# Patient Record
Sex: FEMALE | Race: ASIAN | NOT HISPANIC OR LATINO | URBAN - METROPOLITAN AREA
[De-identification: names, ages, dates, MRNs, and addresses within clinical notes are randomized per-mention and may not be internally consistent; named-entity substitution may affect disease eponyms.]

---

## 2017-03-16 ENCOUNTER — EMERGENCY (EMERGENCY)
Facility: HOSPITAL | Age: 32
LOS: 1 days | Discharge: HOME | End: 2017-03-16
Attending: EMERGENCY MEDICINE | Admitting: EMERGENCY MEDICINE
Payer: COMMERCIAL

## 2017-03-16 VITALS
HEART RATE: 76 BPM | RESPIRATION RATE: 18 BRPM | DIASTOLIC BLOOD PRESSURE: 98 MMHG | TEMPERATURE: 98 F | OXYGEN SATURATION: 99 % | SYSTOLIC BLOOD PRESSURE: 125 MMHG

## 2017-03-16 PROCEDURE — 73110 X-RAY EXAM OF WRIST: CPT | Mod: 26,RT

## 2017-03-16 PROCEDURE — 73070 X-RAY EXAM OF ELBOW: CPT | Mod: 26,RT

## 2017-03-16 PROCEDURE — 73060 X-RAY EXAM OF HUMERUS: CPT | Mod: 26,RT

## 2017-03-16 PROCEDURE — 73030 X-RAY EXAM OF SHOULDER: CPT | Mod: 26,RT

## 2017-03-16 PROCEDURE — 70486 CT MAXILLOFACIAL W/O DYE: CPT | Mod: 26

## 2017-03-16 PROCEDURE — 99284 EMERGENCY DEPT VISIT MOD MDM: CPT | Mod: 25

## 2017-03-16 PROCEDURE — 29125 APPL SHORT ARM SPLINT STATIC: CPT

## 2017-03-16 PROCEDURE — 73090 X-RAY EXAM OF FOREARM: CPT | Mod: 26,RT

## 2017-03-16 PROCEDURE — 70450 CT HEAD/BRAIN W/O DYE: CPT | Mod: 26

## 2017-03-16 RX ORDER — ACETAMINOPHEN 500 MG
650 TABLET ORAL ONCE
Qty: 0 | Refills: 0 | Status: COMPLETED | OUTPATIENT
Start: 2017-03-16 | End: 2017-03-16

## 2017-03-16 RX ADMIN — Medication 650 MILLIGRAM(S): at 14:10
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
## 2017-03-16 NOTE — ED PROVIDER NOTE - NS ED MD SCRIBE ATTENDING SCRIBE SECTIONS
VITAL SIGNS( Pullset)/PHYSICAL EXAM/HIV/HISTORY OF PRESENT ILLNESS/PAST MEDICAL/SURGICAL/SOCIAL HISTORY/REVIEW OF SYSTEMS/DISPOSITION

## 2017-03-16 NOTE — ED PROCEDURE NOTE - NS ED PERI VASCULAR NEG
no swelling/fingers/toes warm to touch/capillary refill time < 2 seconds/no paresthesia/Pt able to wiggle fingers. Sensation intact.
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
## 2017-03-16 NOTE — ED PROVIDER NOTE - UPPER EXTREMITY EXAM, RIGHT
Tenderness to palpation over the mid-shaft of the humerus, over the medial epicondyle, and the right snuffbox. No tenderness to palpation over the olecranon.
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
## 2017-03-16 NOTE — ED PROVIDER NOTE - EXTREMITY EXAM
right upper extremity findings/Bilateral shoulders with no obvious deformity. No clavicle tenderness to palpation. Bilateral shoulders with no bony tenderness. Normal range of motion of bilateral hips. No signs of trauma on lower extremities.
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
## 2017-03-20 NOTE — PROVIDER CONTACT NOTE (OTHER) - ASSESSMENT
josesito received call from Christine Dawson [facility] [phone] requesting chart copy to be faxed to [phone].  ED provider note, xra results and discharge notes faxed as requested.

## 2017-10-24 PROBLEM — Z00.00 ENCOUNTER FOR PREVENTIVE HEALTH EXAMINATION: Noted: 2017-10-24

## 2017-11-13 ENCOUNTER — APPOINTMENT (OUTPATIENT)
Dept: OBGYN | Facility: CLINIC | Age: 32
End: 2017-11-13
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
## 2018-09-12 ENCOUNTER — APPOINTMENT (OUTPATIENT)
Dept: OBGYN | Facility: CLINIC | Age: 33
End: 2018-09-12

## 2019-03-27 ENCOUNTER — APPOINTMENT (OUTPATIENT)
Dept: OBGYN | Facility: CLINIC | Age: 34
End: 2019-03-27

## 2019-04-04 ENCOUNTER — APPOINTMENT (OUTPATIENT)
Dept: OBGYN | Facility: CLINIC | Age: 34
End: 2019-04-04
Payer: COMMERCIAL

## 2019-04-04 VITALS
BODY MASS INDEX: 29.02 KG/M2 | WEIGHT: 170 LBS | HEIGHT: 64 IN | DIASTOLIC BLOOD PRESSURE: 88 MMHG | SYSTOLIC BLOOD PRESSURE: 124 MMHG

## 2019-04-04 DIAGNOSIS — R73.03 PREDIABETES.: ICD-10-CM

## 2019-04-04 DIAGNOSIS — O02.1 MISSED ABORTION: ICD-10-CM

## 2019-04-04 PROCEDURE — 99385 PREV VISIT NEW AGE 18-39: CPT

## 2019-04-05 LAB — HPV HIGH+LOW RISK DNA PNL CVX: NOT DETECTED

## 2019-04-09 ENCOUNTER — APPOINTMENT (OUTPATIENT)
Dept: OBGYN | Facility: CLINIC | Age: 34
End: 2019-04-09

## 2019-04-09 LAB — CYTOLOGY CVX/VAG DOC THIN PREP: NORMAL

## 2019-04-15 ENCOUNTER — APPOINTMENT (OUTPATIENT)
Dept: OBGYN | Facility: CLINIC | Age: 34
End: 2019-04-15

## 2019-04-18 ENCOUNTER — APPOINTMENT (OUTPATIENT)
Dept: OBGYN | Facility: CLINIC | Age: 34
End: 2019-04-18

## 2019-05-29 ENCOUNTER — APPOINTMENT (OUTPATIENT)
Dept: OBGYN | Facility: CLINIC | Age: 34
End: 2019-05-29
Payer: COMMERCIAL

## 2019-05-29 PROCEDURE — 36415 COLL VENOUS BLD VENIPUNCTURE: CPT

## 2019-06-01 LAB
FSH SERPL-MCNC: 5.9 IU/L
LH SERPL-ACNC: 11.2 IU/L
PROGEST SERPL-MCNC: 0.1 NG/ML
PROLACTIN SERPL-MCNC: 8 NG/ML
TESTOST SERPL-MCNC: 16.4 NG/DL
TSH SERPL-ACNC: 2.58 UIU/ML

## 2019-06-03 LAB — ANTI-MUELLERIAN HORMONE: 4.65 NG/ML

## 2019-06-04 LAB — DHEA-SULFATE, SERUM: 84 UG/DL

## 2019-06-17 ENCOUNTER — APPOINTMENT (OUTPATIENT)
Dept: OBGYN | Facility: CLINIC | Age: 34
End: 2019-06-17

## 2019-08-30 ENCOUNTER — APPOINTMENT (OUTPATIENT)
Dept: OBGYN | Facility: CLINIC | Age: 34
End: 2019-08-30

## 2019-09-12 ENCOUNTER — APPOINTMENT (OUTPATIENT)
Dept: OBGYN | Facility: CLINIC | Age: 34
End: 2019-09-12

## 2020-08-05 ENCOUNTER — ASOB RESULT (OUTPATIENT)
Age: 35
End: 2020-08-05

## 2020-08-05 ENCOUNTER — APPOINTMENT (OUTPATIENT)
Dept: OBGYN | Facility: CLINIC | Age: 35
End: 2020-08-05
Payer: SELF-PAY

## 2020-08-05 VITALS
HEIGHT: 64 IN | WEIGHT: 174 LBS | SYSTOLIC BLOOD PRESSURE: 125 MMHG | DIASTOLIC BLOOD PRESSURE: 80 MMHG | BODY MASS INDEX: 29.71 KG/M2

## 2020-08-05 PROCEDURE — 36415 COLL VENOUS BLD VENIPUNCTURE: CPT

## 2020-08-05 PROCEDURE — 99395 PREV VISIT EST AGE 18-39: CPT

## 2020-08-05 PROCEDURE — 76817 TRANSVAGINAL US OBSTETRIC: CPT

## 2020-08-05 PROCEDURE — 99213 OFFICE O/P EST LOW 20 MIN: CPT | Mod: 25

## 2020-08-06 ENCOUNTER — APPOINTMENT (OUTPATIENT)
Dept: OBGYN | Facility: CLINIC | Age: 35
End: 2020-08-06

## 2020-08-06 LAB
ABO + RH PNL BLD: NORMAL
ALBUMIN SERPL ELPH-MCNC: 4.1 G/DL
ALP BLD-CCNC: 74 U/L
ALT SERPL-CCNC: 12 U/L
ANION GAP SERPL CALC-SCNC: 10 MMOL/L
AST SERPL-CCNC: 10 U/L
BASOPHILS # BLD AUTO: 0.09 K/UL
BASOPHILS NFR BLD AUTO: 1 %
BILIRUB SERPL-MCNC: 0.5 MG/DL
BLD GP AB SCN SERPL QL: NORMAL
BUN SERPL-MCNC: 10 MG/DL
CALCIUM SERPL-MCNC: 9.3 MG/DL
CHLORIDE SERPL-SCNC: 103 MMOL/L
CO2 SERPL-SCNC: 22 MMOL/L
CREAT SERPL-MCNC: 0.53 MG/DL
EOSINOPHIL # BLD AUTO: 0.39 K/UL
EOSINOPHIL NFR BLD AUTO: 4.5 %
GLUCOSE SERPL-MCNC: 260 MG/DL
HCT VFR BLD CALC: 43.1 %
HGB BLD-MCNC: 13.2 G/DL
HIV1+2 AB SPEC QL IA.RAPID: NONREACTIVE
IMM GRANULOCYTES NFR BLD AUTO: 0.3 %
LYMPHOCYTES # BLD AUTO: 2.19 K/UL
LYMPHOCYTES NFR BLD AUTO: 25.4 %
MAN DIFF?: NORMAL
MCHC RBC-ENTMCNC: 27.8 PG
MCHC RBC-ENTMCNC: 30.6 GM/DL
MCV RBC AUTO: 90.7 FL
MONOCYTES # BLD AUTO: 0.47 K/UL
MONOCYTES NFR BLD AUTO: 5.4 %
NEUTROPHILS # BLD AUTO: 5.46 K/UL
NEUTROPHILS NFR BLD AUTO: 63.4 %
PLATELET # BLD AUTO: 278 K/UL
POTASSIUM SERPL-SCNC: 4.3 MMOL/L
PROT SERPL-MCNC: 6.9 G/DL
RBC # BLD: 4.75 M/UL
RBC # FLD: 13.4 %
SODIUM SERPL-SCNC: 135 MMOL/L
TSH SERPL-ACNC: 1.17 UIU/ML
WBC # FLD AUTO: 8.63 K/UL

## 2020-08-07 LAB
B19V IGG SER QL IA: 0.2 INDEX
B19V IGG+IGM SER-IMP: NEGATIVE
B19V IGG+IGM SER-IMP: NORMAL
B19V IGM FLD-ACNC: 0.2
B19V IGM SER-ACNC: NEGATIVE
C TRACH RRNA SPEC QL NAA+PROBE: NOT DETECTED
HBV SURFACE AG SER QL: NONREACTIVE
HCV AB SER QL: NONREACTIVE
HCV S/CO RATIO: 0.07 S/CO
HPV HIGH+LOW RISK DNA PNL CVX: NOT DETECTED
LEAD BLD-MCNC: <1 UG/DL
MEV IGG FLD QL IA: 62.5 AU/ML
MEV IGG+IGM SER-IMP: POSITIVE
N GONORRHOEA RRNA SPEC QL NAA+PROBE: NOT DETECTED
RUBV IGG FLD-ACNC: 11.1 INDEX
RUBV IGG SER-IMP: POSITIVE
SOURCE TP AMPLIFICATION: NORMAL
T PALLIDUM AB SER QL IA: NEGATIVE
VZV AB TITR SER: POSITIVE
VZV IGG SER IF-ACNC: 219.5 INDEX

## 2020-08-09 LAB
HGB A MFR BLD: 97.1 %
HGB A2 MFR BLD: 2.4 %
HGB FRACT BLD-IMP: NORMAL
MEV IGM SER QL: NEGATIVE

## 2020-08-10 LAB — CYTOLOGY CVX/VAG DOC THIN PREP: ABNORMAL

## 2020-08-14 LAB
AR GENE MUT ANL BLD/T: NORMAL
CFTR MUT TESTED BLD/T: NEGATIVE

## 2020-08-15 LAB — FMR1 GENE MUT ANL BLD/T: NORMAL

## 2020-08-19 ENCOUNTER — NON-APPOINTMENT (OUTPATIENT)
Age: 35
End: 2020-08-19

## 2020-08-19 ENCOUNTER — APPOINTMENT (OUTPATIENT)
Dept: OBGYN | Facility: CLINIC | Age: 35
End: 2020-08-19
Payer: SELF-PAY

## 2020-08-19 VITALS
BODY MASS INDEX: 29.19 KG/M2 | SYSTOLIC BLOOD PRESSURE: 125 MMHG | DIASTOLIC BLOOD PRESSURE: 84 MMHG | WEIGHT: 171 LBS | HEIGHT: 64 IN

## 2020-08-19 DIAGNOSIS — Z00.00 ENCOUNTER FOR GENERAL ADULT MEDICAL EXAMINATION W/OUT ABNORMAL FINDINGS: ICD-10-CM

## 2020-08-19 PROCEDURE — 99213 OFFICE O/P EST LOW 20 MIN: CPT

## 2020-08-19 PROCEDURE — 0502F SUBSEQUENT PRENATAL CARE: CPT

## 2020-08-24 ENCOUNTER — APPOINTMENT (OUTPATIENT)
Dept: ANTEPARTUM | Facility: CLINIC | Age: 35
End: 2020-08-24
Payer: MEDICAID

## 2020-08-24 ENCOUNTER — ASOB RESULT (OUTPATIENT)
Age: 35
End: 2020-08-24

## 2020-08-24 PROCEDURE — 36416 COLLJ CAPILLARY BLOOD SPEC: CPT

## 2020-08-24 PROCEDURE — 76813 OB US NUCHAL MEAS 1 GEST: CPT

## 2020-09-16 ENCOUNTER — APPOINTMENT (OUTPATIENT)
Dept: OBGYN | Facility: CLINIC | Age: 35
End: 2020-09-16
Payer: SELF-PAY

## 2020-09-16 ENCOUNTER — NON-APPOINTMENT (OUTPATIENT)
Age: 35
End: 2020-09-16

## 2020-09-16 VITALS
SYSTOLIC BLOOD PRESSURE: 108 MMHG | BODY MASS INDEX: 29.37 KG/M2 | DIASTOLIC BLOOD PRESSURE: 82 MMHG | HEIGHT: 64 IN | WEIGHT: 172 LBS

## 2020-09-16 PROCEDURE — 0502F SUBSEQUENT PRENATAL CARE: CPT

## 2020-09-16 PROCEDURE — 99213 OFFICE O/P EST LOW 20 MIN: CPT

## 2020-09-16 RX ORDER — METFORMIN HYDROCHLORIDE 500 MG/1
500 TABLET, COATED ORAL
Qty: 180 | Refills: 0 | Status: COMPLETED | COMMUNITY
Start: 2019-04-04 | End: 2020-09-16

## 2020-10-07 ENCOUNTER — APPOINTMENT (OUTPATIENT)
Dept: OBGYN | Facility: CLINIC | Age: 35
End: 2020-10-07
Payer: MEDICAID

## 2020-10-07 ENCOUNTER — NON-APPOINTMENT (OUTPATIENT)
Age: 35
End: 2020-10-07

## 2020-10-07 VITALS
HEIGHT: 64 IN | WEIGHT: 170 LBS | BODY MASS INDEX: 29.02 KG/M2 | SYSTOLIC BLOOD PRESSURE: 128 MMHG | DIASTOLIC BLOOD PRESSURE: 70 MMHG

## 2020-10-07 LAB
BILIRUB UR QL STRIP: NORMAL
GLUCOSE UR-MCNC: 250
HCG UR QL: 0.2 EU/DL
HGB UR QL STRIP.AUTO: NORMAL
KETONES UR-MCNC: NORMAL
LEUKOCYTE ESTERASE UR QL STRIP: NORMAL
NITRITE UR QL STRIP: NORMAL
PH UR STRIP: 6
PROT UR STRIP-MCNC: NORMAL
SP GR UR STRIP: 1.02

## 2020-10-07 PROCEDURE — 36415 COLL VENOUS BLD VENIPUNCTURE: CPT

## 2020-10-07 PROCEDURE — 99213 OFFICE O/P EST LOW 20 MIN: CPT | Mod: TH

## 2020-10-09 ENCOUNTER — APPOINTMENT (OUTPATIENT)
Dept: INTERNAL MEDICINE | Facility: CLINIC | Age: 35
End: 2020-10-09
Payer: MEDICAID

## 2020-10-09 ENCOUNTER — NON-APPOINTMENT (OUTPATIENT)
Age: 35
End: 2020-10-09

## 2020-10-09 VITALS
HEART RATE: 85 BPM | HEIGHT: 64 IN | RESPIRATION RATE: 16 BRPM | WEIGHT: 173 LBS | DIASTOLIC BLOOD PRESSURE: 78 MMHG | BODY MASS INDEX: 29.53 KG/M2 | TEMPERATURE: 98.6 F | SYSTOLIC BLOOD PRESSURE: 109 MMHG | OXYGEN SATURATION: 97 %

## 2020-10-09 PROCEDURE — 99203 OFFICE O/P NEW LOW 30 MIN: CPT

## 2020-10-09 RX ORDER — ALBUTEROL 90 MCG
90 AEROSOL (GRAM) INHALATION
Refills: 0 | Status: ACTIVE | COMMUNITY

## 2020-10-09 NOTE — HISTORY OF PRESENT ILLNESS
[FreeTextEntry8] : 4 months pregnant--gyn ([facility])\par asthma has been flaring past 2 wks--usually worse with cooler weather--using rescue 1-2x/day. No signs infection\par Never on maint inhaler or hospitalized for asthma\par to see pulmonary (raiza) on 10/21

## 2020-10-09 NOTE — PHYSICAL EXAM
[Normal] : no posterior cervical lymphadenopathy and no anterior cervical lymphadenopathy [No Focal Deficits] : no focal deficits

## 2020-10-12 LAB
1ST TRIMESTER DATA: NORMAL
2ND TRIMESTER DATA: NORMAL
ADDENDUM DOC: NORMAL
AFP PNL SERPL: NORMAL
AFP PNL SERPL: NORMAL
AFP SERPL-ACNC: NORMAL
AFP SERPL-ACNC: NORMAL
CLINICAL BIOCHEMIST REVIEW: NORMAL
CLINICAL BIOCHEMIST REVIEW: NORMAL
FREE BETA HCG 1ST TRIMESTER: NORMAL
Lab: NORMAL
NOTES NTD: NORMAL
NOTES NTD: NORMAL
NT: NORMAL
PAPP-A SERPL-ACNC: NORMAL
TRISOMY 18/3: NORMAL

## 2020-10-19 ENCOUNTER — ASOB RESULT (OUTPATIENT)
Age: 35
End: 2020-10-19

## 2020-10-19 ENCOUNTER — APPOINTMENT (OUTPATIENT)
Dept: ANTEPARTUM | Facility: CLINIC | Age: 35
End: 2020-10-19
Payer: MEDICAID

## 2020-10-19 PROCEDURE — 76811 OB US DETAILED SNGL FETUS: CPT

## 2020-10-19 PROCEDURE — 99072 ADDL SUPL MATRL&STAF TM PHE: CPT

## 2020-10-19 PROCEDURE — 76817 TRANSVAGINAL US OBSTETRIC: CPT

## 2020-10-21 ENCOUNTER — APPOINTMENT (OUTPATIENT)
Dept: PULMONOLOGY | Facility: CLINIC | Age: 35
End: 2020-10-21

## 2020-11-02 ENCOUNTER — APPOINTMENT (OUTPATIENT)
Dept: ANTEPARTUM | Facility: CLINIC | Age: 35
End: 2020-11-02
Payer: MEDICAID

## 2020-11-02 ENCOUNTER — ASOB RESULT (OUTPATIENT)
Age: 35
End: 2020-11-02

## 2020-11-02 PROCEDURE — 99072 ADDL SUPL MATRL&STAF TM PHE: CPT

## 2020-11-02 PROCEDURE — 76815 OB US LIMITED FETUS(S): CPT | Mod: 59

## 2020-11-04 ENCOUNTER — NON-APPOINTMENT (OUTPATIENT)
Age: 35
End: 2020-11-04

## 2020-11-04 ENCOUNTER — APPOINTMENT (OUTPATIENT)
Dept: OBGYN | Facility: CLINIC | Age: 35
End: 2020-11-04
Payer: MEDICAID

## 2020-11-04 ENCOUNTER — APPOINTMENT (OUTPATIENT)
Dept: OBGYN | Facility: CLINIC | Age: 35
End: 2020-11-04

## 2020-11-04 VITALS
DIASTOLIC BLOOD PRESSURE: 74 MMHG | HEIGHT: 64 IN | SYSTOLIC BLOOD PRESSURE: 120 MMHG | WEIGHT: 172 LBS | BODY MASS INDEX: 29.37 KG/M2

## 2020-11-04 LAB
BILIRUB UR QL STRIP: NORMAL
GLUCOSE UR-MCNC: 1000
HCG UR QL: 0.2 EU/DL
HGB UR QL STRIP.AUTO: NORMAL
KETONES UR-MCNC: NORMAL
LEUKOCYTE ESTERASE UR QL STRIP: NORMAL
NITRITE UR QL STRIP: NORMAL
PH UR STRIP: 5.5
PROT UR STRIP-MCNC: NORMAL
SP GR UR STRIP: 1.01

## 2020-11-04 PROCEDURE — 99072 ADDL SUPL MATRL&STAF TM PHE: CPT

## 2020-11-04 PROCEDURE — 99213 OFFICE O/P EST LOW 20 MIN: CPT | Mod: TH

## 2020-11-06 LAB — BACTERIA UR CULT: NORMAL

## 2020-12-02 ENCOUNTER — APPOINTMENT (OUTPATIENT)
Dept: OBGYN | Facility: CLINIC | Age: 35
End: 2020-12-02
Payer: MEDICAID

## 2020-12-02 ENCOUNTER — NON-APPOINTMENT (OUTPATIENT)
Age: 35
End: 2020-12-02

## 2020-12-02 VITALS
WEIGHT: 173 LBS | SYSTOLIC BLOOD PRESSURE: 108 MMHG | BODY MASS INDEX: 29.53 KG/M2 | HEIGHT: 64 IN | DIASTOLIC BLOOD PRESSURE: 76 MMHG

## 2020-12-02 PROCEDURE — 99072 ADDL SUPL MATRL&STAF TM PHE: CPT

## 2020-12-02 PROCEDURE — 99213 OFFICE O/P EST LOW 20 MIN: CPT | Mod: TH

## 2020-12-03 LAB
BILIRUB UR QL STRIP: NORMAL
GLUCOSE UR-MCNC: 1000
HCG UR QL: 0.2 EU/DL
HGB UR QL STRIP.AUTO: NORMAL
KETONES UR-MCNC: NORMAL
LEUKOCYTE ESTERASE UR QL STRIP: NORMAL
NITRITE UR QL STRIP: NORMAL
PH UR STRIP: 6.5
PROT UR STRIP-MCNC: NORMAL
SP GR UR STRIP: 1.01

## 2020-12-09 ENCOUNTER — APPOINTMENT (OUTPATIENT)
Dept: OBGYN | Facility: CLINIC | Age: 35
End: 2020-12-09
Payer: MEDICAID

## 2020-12-09 PROCEDURE — 90656 IIV3 VACC NO PRSV 0.5 ML IM: CPT

## 2020-12-09 PROCEDURE — 99072 ADDL SUPL MATRL&STAF TM PHE: CPT

## 2020-12-09 PROCEDURE — 90471 IMMUNIZATION ADMIN: CPT

## 2020-12-10 LAB
BASOPHILS # BLD AUTO: 0.07 K/UL
BASOPHILS NFR BLD AUTO: 0.7 %
EOSINOPHIL # BLD AUTO: 0.17 K/UL
EOSINOPHIL NFR BLD AUTO: 1.6 %
GLUCOSE 1H P 50 G GLC PO SERPL-MCNC: 392 MG/DL
HCT VFR BLD CALC: 39.5 %
HGB BLD-MCNC: 12.3 G/DL
IMM GRANULOCYTES NFR BLD AUTO: 0.9 %
LYMPHOCYTES # BLD AUTO: 2.53 K/UL
LYMPHOCYTES NFR BLD AUTO: 24.2 %
MAN DIFF?: NORMAL
MCHC RBC-ENTMCNC: 29.9 PG
MCHC RBC-ENTMCNC: 31.1 GM/DL
MCV RBC AUTO: 95.9 FL
MONOCYTES # BLD AUTO: 0.67 K/UL
MONOCYTES NFR BLD AUTO: 6.4 %
NEUTROPHILS # BLD AUTO: 6.93 K/UL
NEUTROPHILS NFR BLD AUTO: 66.2 %
PLATELET # BLD AUTO: 263 K/UL
RBC # BLD: 4.12 M/UL
RBC # FLD: 13.4 %
T PALLIDUM AB SER QL IA: NEGATIVE
WBC # FLD AUTO: 10.46 K/UL

## 2020-12-14 ENCOUNTER — ASOB RESULT (OUTPATIENT)
Age: 35
End: 2020-12-14

## 2020-12-14 ENCOUNTER — APPOINTMENT (OUTPATIENT)
Dept: MATERNAL FETAL MEDICINE | Facility: CLINIC | Age: 35
End: 2020-12-14
Payer: MEDICAID

## 2020-12-14 VITALS — WEIGHT: 173 LBS | BODY MASS INDEX: 29.53 KG/M2 | HEIGHT: 64 IN

## 2020-12-14 DIAGNOSIS — O24.419 GESTATIONAL DIABETES MELLITUS IN PREGNANCY, UNSPECIFIED CONTROL: ICD-10-CM

## 2020-12-14 DIAGNOSIS — Z83.3 FAMILY HISTORY OF DIABETES MELLITUS: ICD-10-CM

## 2020-12-14 DIAGNOSIS — Z86.32 PERSONAL HISTORY OF GESTATIONAL DIABETES: ICD-10-CM

## 2020-12-14 PROCEDURE — G0108 DIAB MANAGE TRN  PER INDIV: CPT | Mod: 95

## 2020-12-14 RX ORDER — BLOOD-GLUCOSE METER
W/DEVICE KIT MISCELLANEOUS
Qty: 1 | Refills: 0 | Status: ACTIVE | COMMUNITY
Start: 2020-12-14 | End: 1900-01-01

## 2020-12-16 ENCOUNTER — NON-APPOINTMENT (OUTPATIENT)
Age: 35
End: 2020-12-16

## 2020-12-16 ENCOUNTER — INPATIENT (INPATIENT)
Facility: HOSPITAL | Age: 35
LOS: 1 days | Discharge: HOME | End: 2020-12-18
Attending: OBSTETRICS & GYNECOLOGY | Admitting: OBSTETRICS & GYNECOLOGY
Payer: MEDICAID

## 2020-12-16 VITALS
RESPIRATION RATE: 17 BRPM | DIASTOLIC BLOOD PRESSURE: 88 MMHG | TEMPERATURE: 98 F | SYSTOLIC BLOOD PRESSURE: 132 MMHG | HEART RATE: 101 BPM

## 2020-12-16 DIAGNOSIS — Z3A.00 WEEKS OF GESTATION OF PREGNANCY NOT SPECIFIED: ICD-10-CM

## 2020-12-16 DIAGNOSIS — O24.410 GESTATIONAL DIABETES MELLITUS IN PREGNANCY, DIET CONTROLLED: ICD-10-CM

## 2020-12-16 DIAGNOSIS — O26.899 OTHER SPECIFIED PREGNANCY RELATED CONDITIONS, UNSPECIFIED TRIMESTER: ICD-10-CM

## 2020-12-16 LAB
A1C WITH ESTIMATED AVERAGE GLUCOSE RESULT: 8.4 % — HIGH (ref 4–5.6)
ALBUMIN SERPL ELPH-MCNC: 3.4 G/DL — SIGNIFICANT CHANGE UP (ref 3.3–5)
ALP SERPL-CCNC: 96 U/L — SIGNIFICANT CHANGE UP (ref 40–120)
ALT FLD-CCNC: 12 U/L — SIGNIFICANT CHANGE UP (ref 4–33)
ANION GAP SERPL CALC-SCNC: 12 MMOL/L — SIGNIFICANT CHANGE UP (ref 7–14)
APPEARANCE UR: CLEAR — SIGNIFICANT CHANGE UP
AST SERPL-CCNC: 15 U/L — SIGNIFICANT CHANGE UP (ref 4–32)
B-OH-BUTYR SERPL-SCNC: 0.4 MMOL/L — SIGNIFICANT CHANGE UP (ref 0–0.4)
BILIRUB SERPL-MCNC: 0.5 MG/DL — SIGNIFICANT CHANGE UP (ref 0.2–1.2)
BILIRUB UR-MCNC: NEGATIVE — SIGNIFICANT CHANGE UP
BLD GP AB SCN SERPL QL: NEGATIVE — SIGNIFICANT CHANGE UP
BUN SERPL-MCNC: 7 MG/DL — SIGNIFICANT CHANGE UP (ref 7–23)
CALCIUM SERPL-MCNC: 9.2 MG/DL — SIGNIFICANT CHANGE UP (ref 8.4–10.5)
CHLORIDE SERPL-SCNC: 101 MMOL/L — SIGNIFICANT CHANGE UP (ref 98–107)
CO2 SERPL-SCNC: 19 MMOL/L — LOW (ref 22–31)
COLOR SPEC: SIGNIFICANT CHANGE UP
CREAT SERPL-MCNC: 0.39 MG/DL — LOW (ref 0.5–1.3)
DIFF PNL FLD: NEGATIVE — SIGNIFICANT CHANGE UP
ESTIMATED AVERAGE GLUCOSE: 194 MG/DL — HIGH (ref 68–114)
GLUCOSE BLDC GLUCOMTR-MCNC: 105 MG/DL — HIGH (ref 70–99)
GLUCOSE BLDC GLUCOMTR-MCNC: 131 MG/DL — HIGH (ref 70–99)
GLUCOSE BLDC GLUCOMTR-MCNC: 175 MG/DL — HIGH (ref 70–99)
GLUCOSE BLDC GLUCOMTR-MCNC: 207 MG/DL — HIGH (ref 70–99)
GLUCOSE SERPL-MCNC: 170 MG/DL — HIGH (ref 70–99)
GLUCOSE UR QL: ABNORMAL
KETONES UR-MCNC: NEGATIVE — SIGNIFICANT CHANGE UP
LEUKOCYTE ESTERASE UR-ACNC: NEGATIVE — SIGNIFICANT CHANGE UP
MAGNESIUM SERPL-MCNC: 1.6 MG/DL — SIGNIFICANT CHANGE UP (ref 1.6–2.6)
NITRITE UR-MCNC: NEGATIVE — SIGNIFICANT CHANGE UP
PH UR: 6.5 — SIGNIFICANT CHANGE UP (ref 5–8)
POTASSIUM SERPL-MCNC: 3.8 MMOL/L — SIGNIFICANT CHANGE UP (ref 3.5–5.3)
POTASSIUM SERPL-SCNC: 3.8 MMOL/L — SIGNIFICANT CHANGE UP (ref 3.5–5.3)
PROT SERPL-MCNC: 6.5 G/DL — SIGNIFICANT CHANGE UP (ref 6–8.3)
PROT UR-MCNC: NEGATIVE — SIGNIFICANT CHANGE UP
RH IG SCN BLD-IMP: POSITIVE — SIGNIFICANT CHANGE UP
SARS-COV-2 RNA SPEC QL NAA+PROBE: SIGNIFICANT CHANGE UP
SODIUM SERPL-SCNC: 132 MMOL/L — LOW (ref 135–145)
SP GR SPEC: 1.01 — SIGNIFICANT CHANGE UP (ref 1.01–1.02)
UROBILINOGEN FLD QL: SIGNIFICANT CHANGE UP

## 2020-12-16 PROCEDURE — 93010 ELECTROCARDIOGRAM REPORT: CPT

## 2020-12-16 RX ORDER — SODIUM CHLORIDE 9 MG/ML
1000 INJECTION, SOLUTION INTRAVENOUS
Refills: 0 | Status: DISCONTINUED | OUTPATIENT
Start: 2020-12-16 | End: 2020-12-18

## 2020-12-16 RX ORDER — INSULIN DETEMIR 100/ML (3)
30 INSULIN PEN (ML) SUBCUTANEOUS AT BEDTIME
Refills: 0 | Status: DISCONTINUED | OUTPATIENT
Start: 2020-12-16 | End: 2020-12-17

## 2020-12-16 RX ORDER — DEXTROSE 50 % IN WATER 50 %
25 SYRINGE (ML) INTRAVENOUS ONCE
Refills: 0 | Status: DISCONTINUED | OUTPATIENT
Start: 2020-12-16 | End: 2020-12-18

## 2020-12-16 RX ORDER — SODIUM CHLORIDE 9 MG/ML
1000 INJECTION INTRAMUSCULAR; INTRAVENOUS; SUBCUTANEOUS ONCE
Refills: 0 | Status: COMPLETED | OUTPATIENT
Start: 2020-12-16 | End: 2020-12-16

## 2020-12-16 RX ORDER — DEXTROSE 50 % IN WATER 50 %
15 SYRINGE (ML) INTRAVENOUS ONCE
Refills: 0 | Status: DISCONTINUED | OUTPATIENT
Start: 2020-12-16 | End: 2020-12-18

## 2020-12-16 RX ORDER — DEXTROSE 50 % IN WATER 50 %
12.5 SYRINGE (ML) INTRAVENOUS ONCE
Refills: 0 | Status: DISCONTINUED | OUTPATIENT
Start: 2020-12-16 | End: 2020-12-18

## 2020-12-16 RX ORDER — SODIUM CHLORIDE 9 MG/ML
3 INJECTION INTRAMUSCULAR; INTRAVENOUS; SUBCUTANEOUS EVERY 8 HOURS
Refills: 0 | Status: DISCONTINUED | OUTPATIENT
Start: 2020-12-16 | End: 2020-12-18

## 2020-12-16 RX ORDER — ALBUTEROL 90 UG/1
2 AEROSOL, METERED ORAL EVERY 6 HOURS
Refills: 0 | Status: DISCONTINUED | OUTPATIENT
Start: 2020-12-16 | End: 2020-12-18

## 2020-12-16 RX ORDER — INSULIN LISPRO 100/ML
10 VIAL (ML) SUBCUTANEOUS
Refills: 0 | Status: DISCONTINUED | OUTPATIENT
Start: 2020-12-16 | End: 2020-12-17

## 2020-12-16 RX ORDER — GLUCAGON INJECTION, SOLUTION 0.5 MG/.1ML
1 INJECTION, SOLUTION SUBCUTANEOUS ONCE
Refills: 0 | Status: DISCONTINUED | OUTPATIENT
Start: 2020-12-16 | End: 2020-12-18

## 2020-12-16 RX ORDER — ALBUTEROL 90 UG/1
0 AEROSOL, METERED ORAL
Qty: 0 | Refills: 0 | DISCHARGE

## 2020-12-16 RX ORDER — SODIUM CHLORIDE 9 MG/ML
1000 INJECTION, SOLUTION INTRAVENOUS
Refills: 0 | Status: DISCONTINUED | OUTPATIENT
Start: 2020-12-16 | End: 2020-12-16

## 2020-12-16 RX ORDER — SODIUM CHLORIDE 9 MG/ML
1000 INJECTION INTRAMUSCULAR; INTRAVENOUS; SUBCUTANEOUS
Refills: 0 | Status: DISCONTINUED | OUTPATIENT
Start: 2020-12-16 | End: 2020-12-16

## 2020-12-16 RX ADMIN — Medication 30 UNIT(S): at 23:21

## 2020-12-16 RX ADMIN — SODIUM CHLORIDE 1000 MILLILITER(S): 9 INJECTION INTRAMUSCULAR; INTRAVENOUS; SUBCUTANEOUS at 18:05

## 2020-12-16 NOTE — OB PROVIDER TRIAGE NOTE - NSOBPROVIDERNOTE_OBGYN_ALL_OB_FT
34 y.o.   patient  TIMMY 3/2/2021 @ 29.1 weeks presents with c/o dizziness after eating and blood sugar of 329. Pt denies LOF, VB, ctx. State +FM. Pt is newly diagnosed GDMA1 (). Pt states she is concerned that her blood sugar was 329 after eating pancakes this at 1130 am and that she experiences dizziness after eating.    allergies:  NKDA  medications:  prenatal vitamins    med/surg hx:  asthma - last exacerbation 3 months ago, nebulizer treatment, albuterol prn  OBGYN hx:  2008  7lbs 5 oz GDMA1  TOP x 2 - ,     abdomen soft, nontender  nst reactive  toco: uterine irritability    orthostatic BPs  111/83 P 85  113/75 P 91  111/70 P 108    EKG NSR     34 y.o.  [place] patient  TIMMY 3/2/2021 @ 29.1 weeks presents with c/o dizziness after eating and blood sugar of 329. Pt denies LOF, VB, ctx. State +FM. Pt is newly diagnosed GDMA1 (). Pt states she is concerned that her blood sugar was 329 after eating pancakes this at 1130 am and that she experiences dizziness after eating.    allergies:  NKDA  medications:  prenatal vitamins    med/surg hx:  asthma - last exacerbation 3 months ago, nebulizer treatment, albuterol prn  OBGYN hx:  2008  7lbs 5 oz GDMA1  TOP x 2 - ,     abdomen soft, nontender  nst reactive  toco: uterine irritability    orthostatic BPs  111/83 P 85  113/75 P 91  111/70 P 108    EKG NSR      cmp, ua, serum acetone, hemaglobin A1c pending    1700 Report to BARBIE Pulliam NP.    1800  Received report from BARBIE Pulliam NP. Pt admitted for observation r/o DKA. See H+P    BORIS Harper

## 2020-12-16 NOTE — OB PROVIDER H&P - HISTORY OF PRESENT ILLNESS
34 y.o.   patient  TIMMY 3/2/2021 @ 29.1 weeks presents with c/o dizziness after eating and blood sugar of 329. Pt denies LOF, VB, ctx. State +FM. Pt is newly diagnosed GDMA1 (). Pt states she is concerned that her blood sugar was 329 after eating pancakes this at 1130 am and that she experiences dizziness after eating.    allergies:  NKDA  medications:  prenatal vitamins    med/surg hx:  asthma - last exacerbation 3 months ago, nebulizer treatment, albuterol prn  OBGYN hx:  2008  7lbs 5 oz GDMA1  TOP x 2 - ,

## 2020-12-16 NOTE — OB RN TRIAGE NOTE - CHIEF COMPLAINT QUOTE
I've been dizzy for 2-3 days, I have gestational diabetes and took my sugar at 12:30pm one hour after I ate it was 324

## 2020-12-16 NOTE — OB PROVIDER TRIAGE NOTE - HISTORY OF PRESENT ILLNESS
34 y.o.   patient  TIMMY 3/2/2021 @ 29.1 weeks presents with c/o dizziness after eating and blood sugar of 329. Pt denies LOF, VB, ctx. State +FM. Pt is newly diagnosed GDMA1 (). Pt states she is concerned that her blood sugar was 329 after eating pancakes this at 1130 am and that she experiences dizziness after eating.

## 2020-12-16 NOTE — OB PROVIDER TRIAGE NOTE - PMH
Asthma  last attack 3 months ago used nebulizer  History of termination of pregnancy  ,   Normal vaginal delivery  08 GDMA1  7#5 female

## 2020-12-17 ENCOUNTER — TRANSCRIPTION ENCOUNTER (OUTPATIENT)
Age: 35
End: 2020-12-17

## 2020-12-17 DIAGNOSIS — O24.919 UNSPECIFIED DIABETES MELLITUS IN PREGNANCY, UNSPECIFIED TRIMESTER: ICD-10-CM

## 2020-12-17 LAB
ALBUMIN SERPL ELPH-MCNC: 2.8 G/DL — LOW (ref 3.3–5)
ALP SERPL-CCNC: 82 U/L — SIGNIFICANT CHANGE UP (ref 40–120)
ALT FLD-CCNC: 9 U/L — SIGNIFICANT CHANGE UP (ref 4–33)
ANION GAP SERPL CALC-SCNC: 13 MMOL/L — SIGNIFICANT CHANGE UP (ref 7–14)
AST SERPL-CCNC: 14 U/L — SIGNIFICANT CHANGE UP (ref 4–32)
B-OH-BUTYR SERPL-SCNC: 0.3 MMOL/L — SIGNIFICANT CHANGE UP (ref 0–0.4)
BASOPHILS # BLD AUTO: 0.06 K/UL — SIGNIFICANT CHANGE UP (ref 0–0.2)
BASOPHILS NFR BLD AUTO: 0.7 % — SIGNIFICANT CHANGE UP (ref 0–2)
BILIRUB SERPL-MCNC: 0.5 MG/DL — SIGNIFICANT CHANGE UP (ref 0.2–1.2)
BUN SERPL-MCNC: 8 MG/DL — SIGNIFICANT CHANGE UP (ref 7–23)
CALCIUM SERPL-MCNC: 8.9 MG/DL — SIGNIFICANT CHANGE UP (ref 8.4–10.5)
CHLORIDE SERPL-SCNC: 104 MMOL/L — SIGNIFICANT CHANGE UP (ref 98–107)
CO2 SERPL-SCNC: 19 MMOL/L — LOW (ref 22–31)
CREAT SERPL-MCNC: 0.45 MG/DL — LOW (ref 0.5–1.3)
EOSINOPHIL # BLD AUTO: 0.18 K/UL — SIGNIFICANT CHANGE UP (ref 0–0.5)
EOSINOPHIL NFR BLD AUTO: 2.1 % — SIGNIFICANT CHANGE UP (ref 0–6)
GLUCOSE BLDC GLUCOMTR-MCNC: 111 MG/DL — HIGH (ref 70–99)
GLUCOSE BLDC GLUCOMTR-MCNC: 117 MG/DL — HIGH (ref 70–99)
GLUCOSE BLDC GLUCOMTR-MCNC: 120 MG/DL — HIGH (ref 70–99)
GLUCOSE BLDC GLUCOMTR-MCNC: 123 MG/DL — HIGH (ref 70–99)
GLUCOSE BLDC GLUCOMTR-MCNC: 127 MG/DL — HIGH (ref 70–99)
GLUCOSE BLDC GLUCOMTR-MCNC: 134 MG/DL — HIGH (ref 70–99)
GLUCOSE BLDC GLUCOMTR-MCNC: 224 MG/DL — HIGH (ref 70–99)
GLUCOSE BLDC GLUCOMTR-MCNC: 96 MG/DL — SIGNIFICANT CHANGE UP (ref 70–99)
GLUCOSE SERPL-MCNC: 100 MG/DL — HIGH (ref 70–99)
HCT VFR BLD CALC: 33.5 % — LOW (ref 34.5–45)
HGB BLD-MCNC: 11.2 G/DL — LOW (ref 11.5–15.5)
IANC: 5.08 K/UL — SIGNIFICANT CHANGE UP (ref 1.5–8.5)
IMM GRANULOCYTES NFR BLD AUTO: 0.7 % — SIGNIFICANT CHANGE UP (ref 0–1.5)
LYMPHOCYTES # BLD AUTO: 2.6 K/UL — SIGNIFICANT CHANGE UP (ref 1–3.3)
LYMPHOCYTES # BLD AUTO: 30.3 % — SIGNIFICANT CHANGE UP (ref 13–44)
MCHC RBC-ENTMCNC: 30.2 PG — SIGNIFICANT CHANGE UP (ref 27–34)
MCHC RBC-ENTMCNC: 33.4 GM/DL — SIGNIFICANT CHANGE UP (ref 32–36)
MCV RBC AUTO: 90.3 FL — SIGNIFICANT CHANGE UP (ref 80–100)
MONOCYTES # BLD AUTO: 0.59 K/UL — SIGNIFICANT CHANGE UP (ref 0–0.9)
MONOCYTES NFR BLD AUTO: 6.9 % — SIGNIFICANT CHANGE UP (ref 2–14)
NEUTROPHILS # BLD AUTO: 5.08 K/UL — SIGNIFICANT CHANGE UP (ref 1.8–7.4)
NEUTROPHILS NFR BLD AUTO: 59.3 % — SIGNIFICANT CHANGE UP (ref 43–77)
NRBC # BLD: 0 /100 WBCS — SIGNIFICANT CHANGE UP
NRBC # FLD: 0 K/UL — SIGNIFICANT CHANGE UP
PLATELET # BLD AUTO: 211 K/UL — SIGNIFICANT CHANGE UP (ref 150–400)
POTASSIUM SERPL-MCNC: 3.3 MMOL/L — LOW (ref 3.5–5.3)
POTASSIUM SERPL-SCNC: 3.3 MMOL/L — LOW (ref 3.5–5.3)
PROT SERPL-MCNC: 5.9 G/DL — LOW (ref 6–8.3)
RBC # BLD: 3.71 M/UL — LOW (ref 3.8–5.2)
RBC # FLD: 13.3 % — SIGNIFICANT CHANGE UP (ref 10.3–14.5)
RH IG SCN BLD-IMP: POSITIVE — SIGNIFICANT CHANGE UP
SODIUM SERPL-SCNC: 136 MMOL/L — SIGNIFICANT CHANGE UP (ref 135–145)
T PALLIDUM AB TITR SER: NEGATIVE — SIGNIFICANT CHANGE UP
WBC # BLD: 8.57 K/UL — SIGNIFICANT CHANGE UP (ref 3.8–10.5)
WBC # FLD AUTO: 8.57 K/UL — SIGNIFICANT CHANGE UP (ref 3.8–10.5)

## 2020-12-17 PROCEDURE — 99221 1ST HOSP IP/OBS SF/LOW 40: CPT

## 2020-12-17 RX ORDER — HEPARIN SODIUM 5000 [USP'U]/ML
5000 INJECTION INTRAVENOUS; SUBCUTANEOUS EVERY 12 HOURS
Refills: 0 | Status: DISCONTINUED | OUTPATIENT
Start: 2020-12-17 | End: 2020-12-18

## 2020-12-17 RX ORDER — INSULIN LISPRO 100/ML
12 VIAL (ML) SUBCUTANEOUS
Refills: 0 | Status: DISCONTINUED | OUTPATIENT
Start: 2020-12-17 | End: 2020-12-18

## 2020-12-17 RX ORDER — INSULIN DETEMIR 100/ML (3)
36 INSULIN PEN (ML) SUBCUTANEOUS
Qty: 1080 | Refills: 0
Start: 2020-12-17 | End: 2021-01-15

## 2020-12-17 RX ORDER — INSULIN LISPRO 100/ML
12 VIAL (ML) SUBCUTANEOUS
Qty: 1080 | Refills: 0
Start: 2020-12-17 | End: 2021-01-15

## 2020-12-17 RX ORDER — INSULIN DETEMIR 100/ML (3)
36 INSULIN PEN (ML) SUBCUTANEOUS AT BEDTIME
Refills: 0 | Status: DISCONTINUED | OUTPATIENT
Start: 2020-12-17 | End: 2020-12-18

## 2020-12-17 RX ADMIN — SODIUM CHLORIDE 3 MILLILITER(S): 9 INJECTION INTRAMUSCULAR; INTRAVENOUS; SUBCUTANEOUS at 22:17

## 2020-12-17 RX ADMIN — HEPARIN SODIUM 5000 UNIT(S): 5000 INJECTION INTRAVENOUS; SUBCUTANEOUS at 22:22

## 2020-12-17 RX ADMIN — Medication 12 UNIT(S): at 17:21

## 2020-12-17 RX ADMIN — SODIUM CHLORIDE 3 MILLILITER(S): 9 INJECTION INTRAMUSCULAR; INTRAVENOUS; SUBCUTANEOUS at 13:25

## 2020-12-17 RX ADMIN — Medication 12 UNIT(S): at 13:09

## 2020-12-17 RX ADMIN — SODIUM CHLORIDE 3 MILLILITER(S): 9 INJECTION INTRAMUSCULAR; INTRAVENOUS; SUBCUTANEOUS at 06:36

## 2020-12-17 RX ADMIN — Medication 36 UNIT(S): at 22:18

## 2020-12-17 RX ADMIN — Medication 10 UNIT(S): at 09:43

## 2020-12-17 NOTE — DISCHARGE NOTE ANTEPARTUM - CARE PROVIDER_API CALL
[facility] - GENERAL OTHER  [address], [address] [place] [address]  Phone: [phone]  Fax: [phone]  Follow Up Time:

## 2020-12-17 NOTE — DISCHARGE NOTE ANTEPARTUM - HOSPITAL COURSE
34  at [id number], newly diagnosed GDM, admitted with poorly controlled finger sticks and dizziness. FS at home 329. Patient admitted for blood glucose monitoring and control.  A1C 8.4   Increase Levemir from 30QHS -> 36u QHS due to elevated  this morning, Admelog 10/10/10-> 12 with meals. BSS (): Micheal breech, GAYE 18.2. Nutrition consulted for education regarding diet. Patient no longer feeling dizzy. overall feeling well and understands plan of care and diabetes education. Denies contractions vaginal bleeding leaking fluid. Endorses good fetal movement. Will be discharged home with Humalog and Levemir for close outpatient follow up with Diabetes Educator on Monday and OB next week.  34  at [id number], newly diagnosed GDM, admitted with poorly controlled finger sticks and dizziness. FS at home 329. Patient admitted for blood glucose monitoring and control.  A1C 8.4   Increase Levemir from 30QHS -> 36u QHS due to elevated  this morning, Admelog 10/10/10-> 12-> 12 with meals. BSS (): Micheal breech, GAYE 18.2. Nutrition consulted for education regarding diet. Patient no longer feeling dizzy. overall feeling well and understands plan of care and diabetes education. Denies contractions vaginal bleeding leaking fluid. Endorses good fetal movement. Will be discharged home with Humalog and Levemir for close outpatient follow up with Diabetes Educator on Monday and OB next week.

## 2020-12-17 NOTE — DISCHARGE NOTE ANTEPARTUM - PATIENT PORTAL LINK FT
You can access the FollowMyHealth Patient Portal offered by [facility] by registering at the following website: [email or web address]. By joining [facility]’s FollowMyHealth portal, you will also be able to view your health information using other applications (apps) compatible with our system.

## 2020-12-17 NOTE — DISCHARGE NOTE ANTEPARTUM - CARE PLAN
Principal Discharge DX:	Diabetes in pregnancy  Goal:	Euglycemia  Assessment and plan of treatment:	- Continue to monitor glucose before and one hour post meals  -Continue insulin as prescribed  -Follow up with Diabetes educator on Monday  - Follow up with OB within one week  - Return with contractions, vaginal bleeding, leaking fluid, decreased fetal movement or elevated Blood glucose or hypoglycemia   Principal Discharge DX:	Diabetes in pregnancy  Goal:	Euglycemia  Assessment and plan of treatment:	- Continue to monitor glucose before and one hour post meals  -Continue insulin as prescribed Take 14 units of Admelog before breakfast and 12 units of Admelog before Lunch and Dinner Take 36 units of Levemir at bedtime  -Follow up with Diabetes educator on Monday  - Follow up with OB within one week  - Return with contractions, vaginal bleeding, leaking fluid, decreased fetal movement or elevated Blood glucose or hypoglycemia

## 2020-12-17 NOTE — PROGRESS NOTE ADULT - ATTENDING COMMENTS
Pt seen and examined and agree with above assessment and plan.  continue inpatient management of GDM2, likely pre-existing DM II

## 2020-12-17 NOTE — DISCHARGE NOTE ANTEPARTUM - MEDICATION SUMMARY - MEDICATIONS TO TAKE
I will START or STAY ON the medications listed below when I get home from the hospital:    Admelog SoloStar 100 units/mL injectable solution  -- 12 unit(s) injectable 3 times a day (before meals)   -- Indication: For gdm    Levemir FlexTouch 100 units/mL subcutaneous solution  -- 36 unit(s) subcutaneous once a day (at bedtime)  -- Indication: For gdm    Albuterol (Eqv-ProAir HFA) 90 mcg/inh inhalation aerosol  -- 2 puff(s) inhaled every 6 hours, As Needed - for shortness of breath and/or wheezing  -- Indication: For Asthma    Prenatal 1 oral capsule  -- orally once a day  -- Indication: For pregnancy   I will START or STAY ON the medications listed below when I get home from the hospital:    Levemir FlexTouch 100 units/mL subcutaneous solution  -- 36 unit(s) subcutaneous once a day (at bedtime)  -- Indication: For Diabetes in pregnancy    Admelog SoloStar 100 units/mL injectable solution  -- 14 units before breakfast  12 unit(s) injectable before lunch and dinner   -- Indication: For Diabetes in pregnancy    Albuterol (Eqv-ProAir HFA) 90 mcg/inh inhalation aerosol  -- 2 puff(s) inhaled every 6 hours, As Needed - for shortness of breath and/or wheezing  -- Indication: For Asthma    Prenatal 1 oral capsule  -- orally once a day  -- Indication: For pregnancy

## 2020-12-17 NOTE — DISCHARGE NOTE ANTEPARTUM - PLAN OF CARE
Euglycemia - Continue to monitor glucose before and one hour post meals  -Continue insulin as prescribed  -Follow up with Diabetes educator on Monday  - Follow up with OB within one week  - Return with contractions, vaginal bleeding, leaking fluid, decreased fetal movement or elevated Blood glucose or hypoglycemia - Continue to monitor glucose before and one hour post meals  -Continue insulin as prescribed Take 14 units of Admelog before breakfast and 12 units of Admelog before Lunch and Dinner Take 36 units of Levemir at bedtime  -Follow up with Diabetes educator on Monday  - Follow up with OB within one week  - Return with contractions, vaginal bleeding, leaking fluid, decreased fetal movement or elevated Blood glucose or hypoglycemia

## 2020-12-18 ENCOUNTER — OUTPATIENT (OUTPATIENT)
Dept: OUTPATIENT SERVICES | Facility: HOSPITAL | Age: 35
LOS: 1 days | End: 2020-12-18

## 2020-12-18 ENCOUNTER — ASOB RESULT (OUTPATIENT)
Age: 35
End: 2020-12-18

## 2020-12-18 ENCOUNTER — APPOINTMENT (OUTPATIENT)
Dept: ANTEPARTUM | Facility: HOSPITAL | Age: 35
End: 2020-12-18
Payer: MEDICAID

## 2020-12-18 VITALS
HEART RATE: 96 BPM | RESPIRATION RATE: 16 BRPM | SYSTOLIC BLOOD PRESSURE: 126 MMHG | DIASTOLIC BLOOD PRESSURE: 69 MMHG | TEMPERATURE: 98 F | OXYGEN SATURATION: 100 %

## 2020-12-18 LAB
ALBUMIN SERPL ELPH-MCNC: 3 G/DL — LOW (ref 3.3–5)
ALP SERPL-CCNC: 85 U/L — SIGNIFICANT CHANGE UP (ref 40–120)
ALT FLD-CCNC: 8 U/L — SIGNIFICANT CHANGE UP (ref 4–33)
ANION GAP SERPL CALC-SCNC: 11 MMOL/L — SIGNIFICANT CHANGE UP (ref 7–14)
AST SERPL-CCNC: 15 U/L — SIGNIFICANT CHANGE UP (ref 4–32)
B-OH-BUTYR SERPL-SCNC: 0.6 MMOL/L — HIGH (ref 0–0.4)
B-OH-BUTYR SERPL-SCNC: <0 MMOL/L — SIGNIFICANT CHANGE UP (ref 0–0.4)
BASOPHILS # BLD AUTO: 0.05 K/UL — SIGNIFICANT CHANGE UP (ref 0–0.2)
BASOPHILS NFR BLD AUTO: 0.5 % — SIGNIFICANT CHANGE UP (ref 0–2)
BILIRUB SERPL-MCNC: 0.5 MG/DL — SIGNIFICANT CHANGE UP (ref 0.2–1.2)
BUN SERPL-MCNC: 8 MG/DL — SIGNIFICANT CHANGE UP (ref 7–23)
CALCIUM SERPL-MCNC: 9.3 MG/DL — SIGNIFICANT CHANGE UP (ref 8.4–10.5)
CHLORIDE SERPL-SCNC: 104 MMOL/L — SIGNIFICANT CHANGE UP (ref 98–107)
CO2 SERPL-SCNC: 20 MMOL/L — LOW (ref 22–31)
CREAT SERPL-MCNC: 0.53 MG/DL — SIGNIFICANT CHANGE UP (ref 0.5–1.3)
CULTURE RESULTS: SIGNIFICANT CHANGE UP
EOSINOPHIL # BLD AUTO: 0.18 K/UL — SIGNIFICANT CHANGE UP (ref 0–0.5)
EOSINOPHIL NFR BLD AUTO: 1.8 % — SIGNIFICANT CHANGE UP (ref 0–6)
GLUCOSE BLDC GLUCOMTR-MCNC: 120 MG/DL — HIGH (ref 70–99)
GLUCOSE BLDC GLUCOMTR-MCNC: 120 MG/DL — HIGH (ref 70–99)
GLUCOSE BLDC GLUCOMTR-MCNC: 218 MG/DL — HIGH (ref 70–99)
GLUCOSE BLDC GLUCOMTR-MCNC: 89 MG/DL — SIGNIFICANT CHANGE UP (ref 70–99)
GLUCOSE SERPL-MCNC: 75 MG/DL — SIGNIFICANT CHANGE UP (ref 70–99)
HCT VFR BLD CALC: 34.3 % — LOW (ref 34.5–45)
HGB BLD-MCNC: 11.1 G/DL — LOW (ref 11.5–15.5)
IANC: 5.45 K/UL — SIGNIFICANT CHANGE UP (ref 1.5–8.5)
IMM GRANULOCYTES NFR BLD AUTO: 0.5 % — SIGNIFICANT CHANGE UP (ref 0–1.5)
LYMPHOCYTES # BLD AUTO: 3.9 K/UL — HIGH (ref 1–3.3)
LYMPHOCYTES # BLD AUTO: 38 % — SIGNIFICANT CHANGE UP (ref 13–44)
MCHC RBC-ENTMCNC: 29.3 PG — SIGNIFICANT CHANGE UP (ref 27–34)
MCHC RBC-ENTMCNC: 32.4 GM/DL — SIGNIFICANT CHANGE UP (ref 32–36)
MCV RBC AUTO: 90.5 FL — SIGNIFICANT CHANGE UP (ref 80–100)
MONOCYTES # BLD AUTO: 0.63 K/UL — SIGNIFICANT CHANGE UP (ref 0–0.9)
MONOCYTES NFR BLD AUTO: 6.1 % — SIGNIFICANT CHANGE UP (ref 2–14)
NEUTROPHILS # BLD AUTO: 5.45 K/UL — SIGNIFICANT CHANGE UP (ref 1.8–7.4)
NEUTROPHILS NFR BLD AUTO: 53.1 % — SIGNIFICANT CHANGE UP (ref 43–77)
NRBC # BLD: 0 /100 WBCS — SIGNIFICANT CHANGE UP
NRBC # FLD: 0 K/UL — SIGNIFICANT CHANGE UP
PLATELET # BLD AUTO: 223 K/UL — SIGNIFICANT CHANGE UP (ref 150–400)
POTASSIUM SERPL-MCNC: 3.1 MMOL/L — LOW (ref 3.5–5.3)
POTASSIUM SERPL-SCNC: 3.1 MMOL/L — LOW (ref 3.5–5.3)
PROT SERPL-MCNC: 5.9 G/DL — LOW (ref 6–8.3)
RBC # BLD: 3.79 M/UL — LOW (ref 3.8–5.2)
RBC # FLD: 13.6 % — SIGNIFICANT CHANGE UP (ref 10.3–14.5)
SARS-COV-2 IGG SERPL QL IA: NEGATIVE — SIGNIFICANT CHANGE UP
SARS-COV-2 IGM SERPL IA-ACNC: <0.1 INDEX — SIGNIFICANT CHANGE UP
SODIUM SERPL-SCNC: 135 MMOL/L — SIGNIFICANT CHANGE UP (ref 135–145)
SPECIMEN SOURCE: SIGNIFICANT CHANGE UP
WBC # BLD: 10.26 K/UL — SIGNIFICANT CHANGE UP (ref 3.8–10.5)
WBC # FLD AUTO: 10.26 K/UL — SIGNIFICANT CHANGE UP (ref 3.8–10.5)

## 2020-12-18 PROCEDURE — 76819 FETAL BIOPHYS PROFIL W/O NST: CPT | Mod: 26

## 2020-12-18 PROCEDURE — 76805 OB US >/= 14 WKS SNGL FETUS: CPT | Mod: 26

## 2020-12-18 PROCEDURE — 99238 HOSP IP/OBS DSCHRG MGMT 30/<: CPT

## 2020-12-18 RX ORDER — INSULIN LISPRO 100/ML
12 VIAL (ML) SUBCUTANEOUS
Refills: 0 | Status: DISCONTINUED | OUTPATIENT
Start: 2020-12-18 | End: 2020-12-18

## 2020-12-18 RX ORDER — POTASSIUM CHLORIDE 20 MEQ
40 PACKET (EA) ORAL ONCE
Refills: 0 | Status: COMPLETED | OUTPATIENT
Start: 2020-12-18 | End: 2020-12-18

## 2020-12-18 RX ORDER — INSULIN LISPRO 100/ML
14 VIAL (ML) SUBCUTANEOUS
Refills: 0 | Status: DISCONTINUED | OUTPATIENT
Start: 2020-12-18 | End: 2020-12-18

## 2020-12-18 RX ORDER — INSULIN LISPRO 100/ML
12 VIAL (ML) SUBCUTANEOUS
Qty: 1080 | Refills: 0
Start: 2020-12-18 | End: 2021-01-16

## 2020-12-18 RX ADMIN — Medication 40 MILLIEQUIVALENT(S): at 12:13

## 2020-12-18 RX ADMIN — HEPARIN SODIUM 5000 UNIT(S): 5000 INJECTION INTRAVENOUS; SUBCUTANEOUS at 11:13

## 2020-12-18 RX ADMIN — SODIUM CHLORIDE 3 MILLILITER(S): 9 INJECTION INTRAMUSCULAR; INTRAVENOUS; SUBCUTANEOUS at 06:52

## 2020-12-18 RX ADMIN — Medication 12 UNIT(S): at 08:37

## 2020-12-18 RX ADMIN — Medication 12 UNIT(S): at 12:11

## 2020-12-18 NOTE — PROGRESS NOTE ADULT - ATTENDING COMMENTS
Pt seen and examined and agree with above assessment and plan.  FS vastly improved.  Consider d/c home today w/follow up w/diabetic educators.  Appt on Monday and prenatal appt on Wednesday.

## 2020-12-18 NOTE — DIETITIAN INITIAL EVALUATION ADULT. - OTHER INFO
Pt remains with adequate PO intakes although Pt prefers home cooked, ethnic meals. Remains with some nausea but no vomiting and has regular bowel movements. Pt is currently on Levemir and Admelog. Reports to receiving some GDM related education prior to admission with a Registered Dietitian. Reported pre pregnancy weight 162#, current admit 174#. Noted with 12# weight increase at 29 weeks.   Pt was educated on CHO content of meals, label reading to identify grams of carbohydrate, including protein/fiber rich foods with each meal. Also encouraged self glucose monitoring. Pt with many appropriate questions in reference to managing blood glucose levels while including favorite and ethnic meals. All question and concerns were addressed and also suggested outpatient Endocrine follow up. Pt remains with adequate PO intakes although Pt prefers home cooked, ethnic meals. Remains with some nausea but no vomiting and has regular bowel movements. Pt is currently on Levemir and Admelog. Reports to receiving some GDM related education prior to admission with a Registered Dietitian. Reported pre pregnancy weight 162#, current admit 174#. Noted with 12# weight increase at 29 weeks.   Pt was educated on CHO content of meals, label reading to identify grams of carbohydrate, including protein/fiber rich foods with each meal. Also encouraged self glucose monitoring. Pt with many appropriate questions in reference to managing blood glucose levels while including favorite and ethnic meals. All questions and concerns were addressed and also suggested outpatient Endocrine follow up.

## 2020-12-18 NOTE — PROGRESS NOTE ADULT - ASSESSMENT
34  at [id number], newly diagnosed GDM, admitted with poorly controlled finger sticks and dizziness. FS at home 329. Patient admitted for blood glucose monitoring and control.     #GDM, likely T2DM   - A1C 8.4   - Monitor FS fasting, before meals, after meals   - Levemir increased from 30QHS -> 36u QHS  due to elevated fasting finger sticks. Admelog increased from 10/10/10 to 12 with improved blood glucose control   - ADA diet  - Nutrition, diabetes consult   - Anion gap 12, B-hydroxybutyrate wnl, UA neg for ketones    #Fetal wellbeing  - BSS (): Micheal breech, GAYE 135g, GAYE 18.2   - NST BID  - Will hold BMZ at this time, no immediate plans for delivery   - AM ATU sono     #Maternal wellbeing  - ADA diet  - HSQ, venodynes for DVT ppx  - PNV, Fe, folic acid           
34  at [id number], newly diagnosed GDM, admitted with poorly controlled finger sticks and dizziness. FS at home 329. Patient admitted for blood glucose monitoring and control.     #GDM, likely T2DM   - A1C 8.4   - Monitor FS fasting, before meals, after meals   - Will increase Levemir from 30QHS -> 36u QHS due to elevated  this morning, Admelog 10/10/10 with meals   - ADA diet  - Nutrition, diabetes consult   - Anion gap 12, B-hydroxybutyrate wnl, UA neg for ketones    #Fetal wellbeing  - BSS (): Micheal breech, GAYE 135g, GAYE 18.2   - NST BID  - Will hold BMZ at this time, no immediate plans for delivery   - AM ATU sono     #Maternal wellbeing  - ADA diet  - HSQ, venodynes for DVT ppx  - PNV, Fe, folic acid

## 2020-12-18 NOTE — DIETITIAN INITIAL EVALUATION ADULT. - PERTINENT LABORATORY DATA
CAPILLARY BLOOD GLUCOSE  POCT Blood Glucose.: 218 mg/dL (18 Dec 2020 10:04)  POCT Blood Glucose.: 89 mg/dL (18 Dec 2020 07:52)  POCT Blood Glucose.: 111 mg/dL (17 Dec 2020 22:16)  POCT Blood Glucose.: 123 mg/dL (17 Dec 2020 18:33)  POCT Blood Glucose.: 96 mg/dL (17 Dec 2020 17:17)  POCT Blood Glucose.: 120 mg/dL (17 Dec 2020 14:53)  POCT Blood Glucose.: 117 mg/dL (17 Dec 2020 14:37)  POCT Blood Glucose.: 134 mg/dL (17 Dec 2020 13:08)  POCT Blood Glucose.: 224 mg/dL (17 Dec 2020 11:06)    HgA1C 8.4%

## 2020-12-18 NOTE — DIETITIAN INITIAL EVALUATION ADULT. - PERTINENT MEDS FT
MEDICATIONS  (STANDING):  dextrose 40% Gel 15 Gram(s) Oral once  dextrose 5%. 1000 milliLiter(s) (50 mL/Hr) IV Continuous <Continuous>  dextrose 5%. 1000 milliLiter(s) (100 mL/Hr) IV Continuous <Continuous>  dextrose 50% Injectable 25 Gram(s) IV Push once  dextrose 50% Injectable 12.5 Gram(s) IV Push once  dextrose 50% Injectable 25 Gram(s) IV Push once  glucagon  Injectable 1 milliGRAM(s) IntraMuscular once  heparin   Injectable 5000 Unit(s) SubCutaneous every 12 hours  insulin detemir injectable (LEVEMIR) 36 Unit(s) SubCutaneous at bedtime  insulin lispro Injectable (ADMELOG) 12 Unit(s) SubCutaneous three times a day before meals  sodium chloride 0.9% lock flush 3 milliLiter(s) IV Push every 8 hours

## 2020-12-18 NOTE — PROGRESS NOTE ADULT - SUBJECTIVE AND OBJECTIVE BOX
Antepartum Progress Note  HD#3     Patient seen and examined at bedside, no acute overnight events. No acute complaints. Denies abdominal pain, N/V, fevers, chills, constipation, diarrhea, lightheadedness/dizziness, CP, SOB, palpitations. Denies ctx, LOF, VB and endorses good FM.     Vital Signs Last 24 Hrs  T(C): 36.7 (18 Dec 2020 05:19), Max: 36.9 (17 Dec 2020 09:46)  T(F): 98 (18 Dec 2020 05:19), Max: 98.5 (17 Dec 2020 09:46)  HR: 94 (18 Dec 2020 05:19) (83 - 99)  BP: 112/63 (18 Dec 2020 05:19) (104/64 - 120/77)  BP(mean): --  RR: 17 (18 Dec 2020 05:19) (16 - 17)  SpO2: 97% (18 Dec 2020 05:19) (97% - 100%)                            11.2   8.57  )-----------( 211      ( 17 Dec 2020 06:13 )             33.5       12-17    136  |  104  |  8   ----------------------------<  100<H>  3.3<L>   |  19<L>  |  0.45<L>    Ca    8.9      17 Dec 2020 06:13  Mg     1.6     12-16    TPro  5.9<L>  /  Alb  2.8<L>  /  TBili  0.5  /  DBili  x   /  AST  14  /  ALT  9   /  AlkPhos  82  -      CAPILLARY BLOOD GLUCOSE  POCT Blood Glucose.: 111 mg/dL (17 Dec 2020 22:16)  POCT Blood Glucose.: 123 mg/dL (17 Dec 2020 18:33)  POCT Blood Glucose.: 96 mg/dL (17 Dec 2020 17:17)  POCT Blood Glucose.: 120 mg/dL (17 Dec 2020 14:53)  POCT Blood Glucose.: 117 mg/dL (17 Dec 2020 14:37)  POCT Blood Glucose.: 134 mg/dL (17 Dec 2020 13:08)  POCT Blood Glucose.: 224 mg/dL (17 Dec 2020 11:06)  POCT Blood Glucose.: 127 mg/dL (17 Dec 2020 09:29)      LIVER FUNCTIONS - ( 17 Dec 2020 06:13 )  Alb: 2.8 g/dL / Pro: 5.9 g/dL / ALK PHOS: 82 U/L / ALT: 9 U/L / AST: 14 U/L / GGT: x             Urinalysis Basic - ( 16 Dec 2020 15:52 )    Color: Light Yellow / Appearance: Clear / S.013 / pH: x  Gluc: x / Ketone: Negative  / Bili: Negative / Urobili: <2 mg/dL   Blood: x / Protein: Negative / Nitrite: Negative   Leuk Esterase: Negative / RBC: x / WBC x   Sq Epi: x / Non Sq Epi: x / Bacteria: x      Physical Exam:  General: NAD, AOx3   Abdomen: Soft, gravid, non-tender, non-distended  Ext: No edema/tenderness/erythema in b/l LE     MEDICATIONS  (STANDING):  dextrose 40% Gel 15 Gram(s) Oral once  dextrose 5%. 1000 milliLiter(s) (50 mL/Hr) IV Continuous <Continuous>  dextrose 5%. 1000 milliLiter(s) (100 mL/Hr) IV Continuous <Continuous>  dextrose 50% Injectable 25 Gram(s) IV Push once  dextrose 50% Injectable 12.5 Gram(s) IV Push once  dextrose 50% Injectable 25 Gram(s) IV Push once  glucagon  Injectable 1 milliGRAM(s) IntraMuscular once  heparin   Injectable 5000 Unit(s) SubCutaneous every 12 hours  insulin detemir injectable (LEVEMIR) 36 Unit(s) SubCutaneous at bedtime  insulin lispro Injectable (ADMELOG) 12 Unit(s) SubCutaneous three times a day before meals  sodium chloride 0.9% lock flush 3 milliLiter(s) IV Push every 8 hours    MEDICATIONS  (PRN):  ALBUTerol    90 MICROgram(s) HFA Inhaler 2 Puff(s) Inhalation every 6 hours PRN for shortness of breath and/or wheezing  
Antepartum Progress Note  HD#2     Patient seen and examined at bedside, no acute overnight events. No acute complaints. She denies further dizziness. Denies abdominal pain, N/V, fevers, chills, constipation, diarrhea, lightheadedness/dizziness, CP, SOB, palpitations. Denies ctx, LOF, VB and endorses good FM.     Vital Signs Last 24 Hours  T(C): 36.9 (12-17-20 @ 09:46), Max: 36.9 (12-17-20 @ 09:46)  HR: 88 (12-17-20 @ 09:46) (80 - 108)  BP: 117/75 (12-17-20 @ 09:46) (107/72 - 132/88)  RR: 17 (12-17-20 @ 09:46) (16 - 20)  SpO2: 98% (12-17-20 @ 09:46) (98% - 100%)    Physical Exam:  General: NAD, AOx3   Abdomen: Soft, gravid, non-tender, non-distended  Ext: No edema/tenderness/erythema in b/l LE       Labs:             11.2<L>  8.57  )-----------( 211      ( 12-17 @ 06:13 )             33.5<L>        MEDICATIONS  (STANDING):  dextrose 40% Gel 15 Gram(s) Oral once  dextrose 5%. 1000 milliLiter(s) (50 mL/Hr) IV Continuous <Continuous>  dextrose 5%. 1000 milliLiter(s) (100 mL/Hr) IV Continuous <Continuous>  dextrose 50% Injectable 25 Gram(s) IV Push once  dextrose 50% Injectable 12.5 Gram(s) IV Push once  dextrose 50% Injectable 25 Gram(s) IV Push once  glucagon  Injectable 1 milliGRAM(s) IntraMuscular once  insulin detemir injectable (LEVEMIR) 36 Unit(s) SubCutaneous at bedtime  insulin lispro Injectable (ADMELOG) 10 Unit(s) SubCutaneous three times a day before meals  sodium chloride 0.9% lock flush 3 milliLiter(s) IV Push every 8 hours    MEDICATIONS  (PRN):  ALBUTerol    90 MICROgram(s) HFA Inhaler 2 Puff(s) Inhalation every 6 hours PRN for shortness of breath and/or wheezing

## 2020-12-21 ENCOUNTER — ASOB RESULT (OUTPATIENT)
Age: 35
End: 2020-12-21

## 2020-12-21 ENCOUNTER — APPOINTMENT (OUTPATIENT)
Dept: MATERNAL FETAL MEDICINE | Facility: CLINIC | Age: 35
End: 2020-12-21
Payer: MEDICAID

## 2020-12-21 DIAGNOSIS — O24.419 GESTATIONAL DIABETES MELLITUS IN PREGNANCY, UNSPECIFIED CONTROL: ICD-10-CM

## 2020-12-21 PROCEDURE — G0108 DIAB MANAGE TRN  PER INDIV: CPT | Mod: 95

## 2020-12-23 ENCOUNTER — APPOINTMENT (OUTPATIENT)
Dept: OBGYN | Facility: CLINIC | Age: 35
End: 2020-12-23
Payer: MEDICAID

## 2020-12-23 ENCOUNTER — NON-APPOINTMENT (OUTPATIENT)
Age: 35
End: 2020-12-23

## 2020-12-23 VITALS
HEIGHT: 64 IN | WEIGHT: 176 LBS | SYSTOLIC BLOOD PRESSURE: 118 MMHG | BODY MASS INDEX: 30.05 KG/M2 | DIASTOLIC BLOOD PRESSURE: 66 MMHG

## 2020-12-23 LAB
BILIRUB UR QL STRIP: NORMAL
GLUCOSE UR-MCNC: NORMAL
HCG UR QL: 0.2 EU/DL
HGB UR QL STRIP.AUTO: NORMAL
KETONES UR-MCNC: NORMAL
LEUKOCYTE ESTERASE UR QL STRIP: NORMAL
NITRITE UR QL STRIP: NORMAL
PH UR STRIP: 7
PROT UR STRIP-MCNC: NORMAL
SP GR UR STRIP: 1.01

## 2020-12-23 PROCEDURE — 99072 ADDL SUPL MATRL&STAF TM PHE: CPT

## 2020-12-23 PROCEDURE — 90715 TDAP VACCINE 7 YRS/> IM: CPT

## 2020-12-23 PROCEDURE — 90471 IMMUNIZATION ADMIN: CPT

## 2020-12-23 PROCEDURE — 99213 OFFICE O/P EST LOW 20 MIN: CPT | Mod: TH,25

## 2020-12-28 ENCOUNTER — APPOINTMENT (OUTPATIENT)
Dept: ANTEPARTUM | Facility: CLINIC | Age: 35
End: 2020-12-28
Payer: MEDICAID

## 2020-12-28 ENCOUNTER — ASOB RESULT (OUTPATIENT)
Age: 35
End: 2020-12-28

## 2020-12-28 ENCOUNTER — APPOINTMENT (OUTPATIENT)
Dept: MATERNAL FETAL MEDICINE | Facility: CLINIC | Age: 35
End: 2020-12-28
Payer: MEDICAID

## 2020-12-28 VITALS
SYSTOLIC BLOOD PRESSURE: 92 MMHG | DIASTOLIC BLOOD PRESSURE: 64 MMHG | OXYGEN SATURATION: 98 % | HEIGHT: 64 IN | HEART RATE: 86 BPM | WEIGHT: 175.25 LBS | BODY MASS INDEX: 29.92 KG/M2 | RESPIRATION RATE: 18 BRPM

## 2020-12-28 DIAGNOSIS — Z78.9 OTHER SPECIFIED HEALTH STATUS: ICD-10-CM

## 2020-12-28 DIAGNOSIS — Z87.09 PERSONAL HISTORY OF OTHER DISEASES OF THE RESPIRATORY SYSTEM: ICD-10-CM

## 2020-12-28 PROCEDURE — 99215 OFFICE O/P EST HI 40 MIN: CPT | Mod: TH

## 2020-12-28 PROCEDURE — 76820 UMBILICAL ARTERY ECHO: CPT

## 2020-12-28 PROCEDURE — 76816 OB US FOLLOW-UP PER FETUS: CPT

## 2020-12-28 PROCEDURE — 93976 VASCULAR STUDY: CPT

## 2020-12-28 PROCEDURE — 99072 ADDL SUPL MATRL&STAF TM PHE: CPT

## 2020-12-28 NOTE — DISCUSSION/SUMMARY
[FreeTextEntry1] : The patient is a 34-year-old -0-2-1 being seen today at 31 weeks for advanced maternal age and gestational diabetes A2.  Dietary consultation was sent under separate cover.  The patient is on 36 units of Levemir at bedtime as well as 14 units of regular insulin prebreakfast, 12 units prelunch and 12 units predinner.\par \par Her obstetrical history is significant for delivery in  of a liveborn female  weighing 7 pounds 15 ounces delivered at 39 weeks by normal spontaneous vaginal delivery.  That pregnancy was complicated by gestational diabetes A1.  In addition she has had 2 first trimester termination of pregnancies, 1 surgical and one medical.\par \par A growth scan was performed today and reveals a single viable intrauterine gestation with the estimated fetal weight at 3 pounds 13 ounces which is consistent with the 50th percentile.  Vertex presentation with an anterior placenta is seen and the amniotic fluid is normal at 17.57 cm.  Normal Doppler flow studies.  Vital signs today reveal a blood pressure of 92/64 and maternal weight is 175.4 pounds consistent with a BMI of 30.08 kg.  The patient did not bring in her diabetic flowsheets, but states that her fasting blood sugars are between 90-92 and the majority of her postprandial blood sugars are between 140 and 150.\par \par The patient had a 1 hour GCT performed 2 weeks ago and her blood sugar was greater than 300.  She was admitted to the hospital for management.  Hemoglobin A1c done during that hospitalization was elevated 8.4%.  She was advised to increase her bedtime Levemir to 40 units and to increase her premeal regular insulin by 2 units.  She will continue to increase her bedtime Levemir by 2 units every 2 days until fasting blood sugars are under 90.  In addition she will continue to increase her premeal regular insulin until her postprandial blood sugars are under 140.  Weekly  testing until delivery is recommended.  Follow-up dietary consultation in 2 weeks is recommended.  A growth scan in 1 month with [facility] consultation is recommended.  The patient was advised to have an ophthalmological evaluation to rule out diabetic retinopathy.  In addition 24-hour urine collection should be performed to assess renal function.  The patient understands that her diabetes may have predated her pregnancy and she should be evaluated post delivery with a 75 g 2-hour GCT and/or have endocrinology follow-up consultation.  The problems and complications related to a diabetic pregnancy were discussed including fetal macrosomia, increased risk for  section operative vaginal delivery, stillbirth and increased risk for  intensive care admission.  All of the above was discussed with the patient all of her questions were answered.\par \par Her mother, father and sister have diabetes.  She has had 1 surgical first trimester termination of pregnancy.  She has no known allergies to medications and denies alcohol, tobacco or drug use.\par \par I spent a total of 40 minutes face-to-face of which greater than 50% was counseling and coordinating care.\par \par Recommendations;\par \par 1.  Follow-up growth scan in 1 month is recommended.\par 2.  Weekly  testing until delivery is recommended.\par 3.  Continue current ADA diet and home glucose monitoring.\par 4.  Increase bedtime Levemir to 40 units.\par 5.  Increase premeal regular insulin by 2 units.\par 6.  Follow-up dietary consultation in 2 weeks is recommended.\par 7.  Follow-up maternal-fetal medicine consultation 1 month is recommended.\par 8.  Ophthalmologic evaluation is recommended.\par 9.  Endocrinology consultation and or 2-hour 75 g GCT after her postpartum visit is recommended.\par

## 2020-12-29 ENCOUNTER — RX CHANGE (OUTPATIENT)
Age: 35
End: 2020-12-29

## 2020-12-29 RX ORDER — BLOOD SUGAR DIAGNOSTIC
STRIP MISCELLANEOUS
Qty: 250 | Refills: 0 | Status: DISCONTINUED | COMMUNITY
Start: 2020-12-14 | End: 2020-12-29

## 2021-01-04 ENCOUNTER — ASOB RESULT (OUTPATIENT)
Age: 36
End: 2021-01-04

## 2021-01-04 ENCOUNTER — APPOINTMENT (OUTPATIENT)
Dept: ANTEPARTUM | Facility: CLINIC | Age: 36
End: 2021-01-04
Payer: MEDICAID

## 2021-01-04 PROCEDURE — 99072 ADDL SUPL MATRL&STAF TM PHE: CPT

## 2021-01-04 PROCEDURE — 76818 FETAL BIOPHYS PROFILE W/NST: CPT

## 2021-01-06 ENCOUNTER — NON-APPOINTMENT (OUTPATIENT)
Age: 36
End: 2021-01-06

## 2021-01-06 ENCOUNTER — APPOINTMENT (OUTPATIENT)
Dept: OBGYN | Facility: CLINIC | Age: 36
End: 2021-01-06
Payer: MEDICAID

## 2021-01-06 VITALS
WEIGHT: 175 LBS | DIASTOLIC BLOOD PRESSURE: 76 MMHG | SYSTOLIC BLOOD PRESSURE: 128 MMHG | HEIGHT: 64 IN | BODY MASS INDEX: 29.88 KG/M2

## 2021-01-06 PROCEDURE — 99213 OFFICE O/P EST LOW 20 MIN: CPT | Mod: TH

## 2021-01-06 PROCEDURE — 99072 ADDL SUPL MATRL&STAF TM PHE: CPT

## 2021-01-07 LAB
BILIRUB UR QL STRIP: NORMAL
GLUCOSE UR-MCNC: NORMAL
HCG UR QL: 0.2 EU/DL
HGB UR QL STRIP.AUTO: NORMAL
KETONES UR-MCNC: NORMAL
LEUKOCYTE ESTERASE UR QL STRIP: NORMAL
NITRITE UR QL STRIP: NORMAL
PH UR STRIP: 6.5
PROT UR STRIP-MCNC: NORMAL
SP GR UR STRIP: 1.01

## 2021-01-11 ENCOUNTER — APPOINTMENT (OUTPATIENT)
Dept: MATERNAL FETAL MEDICINE | Facility: CLINIC | Age: 36
End: 2021-01-11

## 2021-01-11 ENCOUNTER — ASOB RESULT (OUTPATIENT)
Age: 36
End: 2021-01-11

## 2021-01-11 RX ORDER — INSULIN LISPRO 100 U/ML
100 INJECTION, SOLUTION INTRAVENOUS; SUBCUTANEOUS
Refills: 0 | Status: DISCONTINUED | COMMUNITY
End: 2021-01-11

## 2021-01-12 ENCOUNTER — APPOINTMENT (OUTPATIENT)
Dept: ANTEPARTUM | Facility: CLINIC | Age: 36
End: 2021-01-12
Payer: MEDICAID

## 2021-01-12 ENCOUNTER — ASOB RESULT (OUTPATIENT)
Age: 36
End: 2021-01-12

## 2021-01-12 PROCEDURE — 76820 UMBILICAL ARTERY ECHO: CPT

## 2021-01-12 PROCEDURE — 99072 ADDL SUPL MATRL&STAF TM PHE: CPT

## 2021-01-12 PROCEDURE — 76818 FETAL BIOPHYS PROFILE W/NST: CPT

## 2021-01-13 ENCOUNTER — NON-APPOINTMENT (OUTPATIENT)
Age: 36
End: 2021-01-13

## 2021-01-13 ENCOUNTER — RX RENEWAL (OUTPATIENT)
Age: 36
End: 2021-01-13

## 2021-01-13 RX ORDER — INSULIN DETEMIR 100 [IU]/ML
100 INJECTION, SOLUTION SUBCUTANEOUS
Refills: 0 | Status: DISCONTINUED | COMMUNITY
End: 2021-01-13

## 2021-01-19 ENCOUNTER — APPOINTMENT (OUTPATIENT)
Dept: ANTEPARTUM | Facility: CLINIC | Age: 36
End: 2021-01-19
Payer: MEDICAID

## 2021-01-19 ENCOUNTER — ASOB RESULT (OUTPATIENT)
Age: 36
End: 2021-01-19

## 2021-01-19 PROCEDURE — 76820 UMBILICAL ARTERY ECHO: CPT

## 2021-01-19 PROCEDURE — 99072 ADDL SUPL MATRL&STAF TM PHE: CPT

## 2021-01-19 PROCEDURE — 76818 FETAL BIOPHYS PROFILE W/NST: CPT

## 2021-01-20 ENCOUNTER — APPOINTMENT (OUTPATIENT)
Dept: OBGYN | Facility: CLINIC | Age: 36
End: 2021-01-20
Payer: MEDICAID

## 2021-01-20 ENCOUNTER — NON-APPOINTMENT (OUTPATIENT)
Age: 36
End: 2021-01-20

## 2021-01-20 VITALS
HEIGHT: 64 IN | DIASTOLIC BLOOD PRESSURE: 68 MMHG | WEIGHT: 183 LBS | SYSTOLIC BLOOD PRESSURE: 120 MMHG | BODY MASS INDEX: 31.24 KG/M2

## 2021-01-20 PROCEDURE — 99072 ADDL SUPL MATRL&STAF TM PHE: CPT

## 2021-01-20 PROCEDURE — 99213 OFFICE O/P EST LOW 20 MIN: CPT | Mod: TH

## 2021-01-21 ENCOUNTER — APPOINTMENT (OUTPATIENT)
Dept: MATERNAL FETAL MEDICINE | Facility: CLINIC | Age: 36
End: 2021-01-21
Payer: MEDICAID

## 2021-01-21 ENCOUNTER — ASOB RESULT (OUTPATIENT)
Age: 36
End: 2021-01-21

## 2021-01-21 PROCEDURE — G0108 DIAB MANAGE TRN  PER INDIV: CPT | Mod: 95

## 2021-01-25 ENCOUNTER — APPOINTMENT (OUTPATIENT)
Dept: ANTEPARTUM | Facility: CLINIC | Age: 36
End: 2021-01-25
Payer: MEDICAID

## 2021-01-25 ENCOUNTER — ASOB RESULT (OUTPATIENT)
Age: 36
End: 2021-01-25

## 2021-01-25 ENCOUNTER — APPOINTMENT (OUTPATIENT)
Dept: MATERNAL FETAL MEDICINE | Facility: CLINIC | Age: 36
End: 2021-01-25
Payer: MEDICAID

## 2021-01-25 VITALS
HEART RATE: 80 BPM | SYSTOLIC BLOOD PRESSURE: 104 MMHG | WEIGHT: 184 LBS | DIASTOLIC BLOOD PRESSURE: 70 MMHG | OXYGEN SATURATION: 99 % | HEIGHT: 64 IN | RESPIRATION RATE: 16 BRPM | BODY MASS INDEX: 31.41 KG/M2

## 2021-01-25 DIAGNOSIS — N91.1 SECONDARY AMENORRHEA: ICD-10-CM

## 2021-01-25 DIAGNOSIS — N97.0 FEMALE INFERTILITY ASSOCIATED WITH ANOVULATION: ICD-10-CM

## 2021-01-25 DIAGNOSIS — Z3A.27 27 WEEKS GESTATION OF PREGNANCY: ICD-10-CM

## 2021-01-25 DIAGNOSIS — Z3A.23 23 WEEKS GESTATION OF PREGNANCY: ICD-10-CM

## 2021-01-25 DIAGNOSIS — Z3A.19 19 WEEKS GESTATION OF PREGNANCY: ICD-10-CM

## 2021-01-25 DIAGNOSIS — Z01.419 ENCOUNTER FOR GYNECOLOGICAL EXAMINATION (GENERAL) (ROUTINE) W/OUT ABNORMAL FINDINGS: ICD-10-CM

## 2021-01-25 DIAGNOSIS — Z3A.32 32 WEEKS GESTATION OF PREGNANCY: ICD-10-CM

## 2021-01-25 PROCEDURE — 99215 OFFICE O/P EST HI 40 MIN: CPT | Mod: TH

## 2021-01-25 PROCEDURE — 76820 UMBILICAL ARTERY ECHO: CPT

## 2021-01-25 PROCEDURE — 76818 FETAL BIOPHYS PROFILE W/NST: CPT

## 2021-01-25 PROCEDURE — 99072 ADDL SUPL MATRL&STAF TM PHE: CPT

## 2021-01-25 PROCEDURE — 76816 OB US FOLLOW-UP PER FETUS: CPT

## 2021-01-25 NOTE — DISCUSSION/SUMMARY
[FreeTextEntry1] : Please see the previous consultation for the patient's medical and obstetrical history.  She is currently 34 weeks pregnant with gestational diabetes A2 as well as advanced maternal age.  Dietary consultation was sent under separate cover.  She is on 40 units of Levemir at bedtime and 18 units of edema log prebreakfast 16 units prelunch and 18 units predinner.\par \par Evaluation of her diabetic flowsheets reveals improved control of both fasting and postprandial blood sugars.  A growth scan was performed today and reveals a single viable intrauterine gestation with the estimated fetal weight of 5 pounds 9 ounces consistent with the 46 percentile.  Breech presentation with a posterior placenta seen the amniotic fluid index is normal at 18.33 cm.  Normal umbilical artery Doppler flow study.  Vital signs today reveal a blood pressure of 104/70 maternal weight is 184 pounds consistent with a BMI of 31.58 kg.\par \par Gestational diabetes A2;\par \par Management of her pregnancy was discussed.  At this time the patient will continue with the current insulin dosages that she is using.  She understands that should her fasting blood sugars continue to be elevated she will increase her bedtime insulin by 2 units every 2 days to maintain normal fasting blood sugars.  Weekly testing until delivery is recommended.  A growth scan in 4 weeks is also recommended.  Delivery between 39 and 40 weeks is recommended.  The patient is scheduled for an ophthalmological evaluation in the end of this month.  Her last hemoglobin was performed in December and a follow-up hemoglobin A1c in March was recommended.  In addition a 75 g 2-hour GCT after her postpartum visit is also recommended.  All of the above was discussed with the patient all of her questions were answered\par \par I spent a total of 40 minutes reviewing her diabetic logs, previous dietary consultation, all pertinent blood work, and ultrasound studies of which greater than 50% was counseling and coordinating care.\par \par Recommendations;\par \par 1.  Continue current ADA diet and home glucose monitoring.\par 2.  Continue both long acting and short acting insulin.\par 3.  Ophthalmologic evaluation is scheduled at the end of this week.\par 4.  Follow-up dietary consultation is scheduled.\par 5.  Weekly  testing until delivery.\par 6.  Growth scan in 1 month is recommended.\par 7.  75 g 2-hour GCT after postpartum visit.\par 8.  Follow-up maternal-fetal medicine consultation in 1 month is recommended.

## 2021-01-26 ENCOUNTER — APPOINTMENT (OUTPATIENT)
Dept: ANTEPARTUM | Facility: CLINIC | Age: 36
End: 2021-01-26

## 2021-02-03 ENCOUNTER — APPOINTMENT (OUTPATIENT)
Dept: OBGYN | Facility: CLINIC | Age: 36
End: 2021-02-03
Payer: MEDICAID

## 2021-02-03 ENCOUNTER — NON-APPOINTMENT (OUTPATIENT)
Age: 36
End: 2021-02-03

## 2021-02-03 VITALS
BODY MASS INDEX: 31.24 KG/M2 | HEIGHT: 64 IN | WEIGHT: 183 LBS | SYSTOLIC BLOOD PRESSURE: 102 MMHG | DIASTOLIC BLOOD PRESSURE: 64 MMHG

## 2021-02-03 PROCEDURE — 99072 ADDL SUPL MATRL&STAF TM PHE: CPT

## 2021-02-03 PROCEDURE — 99213 OFFICE O/P EST LOW 20 MIN: CPT | Mod: TH

## 2021-02-03 PROCEDURE — 36415 COLL VENOUS BLD VENIPUNCTURE: CPT

## 2021-02-04 ENCOUNTER — ASOB RESULT (OUTPATIENT)
Age: 36
End: 2021-02-04

## 2021-02-04 ENCOUNTER — APPOINTMENT (OUTPATIENT)
Dept: ANTEPARTUM | Facility: CLINIC | Age: 36
End: 2021-02-04

## 2021-02-04 ENCOUNTER — APPOINTMENT (OUTPATIENT)
Dept: ANTEPARTUM | Facility: CLINIC | Age: 36
End: 2021-02-04
Payer: MEDICAID

## 2021-02-04 PROCEDURE — 99072 ADDL SUPL MATRL&STAF TM PHE: CPT

## 2021-02-04 PROCEDURE — 76820 UMBILICAL ARTERY ECHO: CPT

## 2021-02-04 PROCEDURE — 76818 FETAL BIOPHYS PROFILE W/NST: CPT

## 2021-02-05 LAB
BASOPHILS # BLD AUTO: 0.06 K/UL
BASOPHILS NFR BLD AUTO: 0.5 %
BILIRUB UR QL STRIP: NORMAL
EOSINOPHIL # BLD AUTO: 0.18 K/UL
EOSINOPHIL NFR BLD AUTO: 1.6 %
GLUCOSE UR-MCNC: NORMAL
HCG UR QL: 0.2 EU/DL
HCT VFR BLD CALC: 38.2 %
HGB BLD-MCNC: 12.1 G/DL
HGB UR QL STRIP.AUTO: NORMAL
HIV1+2 AB SPEC QL IA.RAPID: NONREACTIVE
IMM GRANULOCYTES NFR BLD AUTO: 0.6 %
KETONES UR-MCNC: NORMAL
LEUKOCYTE ESTERASE UR QL STRIP: NORMAL
LYMPHOCYTES # BLD AUTO: 2.36 K/UL
LYMPHOCYTES NFR BLD AUTO: 20.7 %
MAN DIFF?: NORMAL
MCHC RBC-ENTMCNC: 29.6 PG
MCHC RBC-ENTMCNC: 31.7 GM/DL
MCV RBC AUTO: 93.4 FL
MONOCYTES # BLD AUTO: 0.64 K/UL
MONOCYTES NFR BLD AUTO: 5.6 %
NEUTROPHILS # BLD AUTO: 8.1 K/UL
NEUTROPHILS NFR BLD AUTO: 71 %
NITRITE UR QL STRIP: NORMAL
PH UR STRIP: 7
PLATELET # BLD AUTO: 201 K/UL
PROT UR STRIP-MCNC: NORMAL
RBC # BLD: 4.09 M/UL
RBC # FLD: 14.3 %
SP GR UR STRIP: 1.01
WBC # FLD AUTO: 11.41 K/UL

## 2021-02-08 LAB
GP B STREP DNA SPEC QL NAA+PROBE: NORMAL
GP B STREP DNA SPEC QL NAA+PROBE: NOT DETECTED
SOURCE GBS: NORMAL

## 2021-02-10 ENCOUNTER — APPOINTMENT (OUTPATIENT)
Dept: OBGYN | Facility: CLINIC | Age: 36
End: 2021-02-10
Payer: MEDICAID

## 2021-02-10 ENCOUNTER — RX RENEWAL (OUTPATIENT)
Age: 36
End: 2021-02-10

## 2021-02-10 VITALS
BODY MASS INDEX: 31.76 KG/M2 | WEIGHT: 186 LBS | DIASTOLIC BLOOD PRESSURE: 82 MMHG | HEIGHT: 64 IN | SYSTOLIC BLOOD PRESSURE: 124 MMHG

## 2021-02-10 PROCEDURE — 99213 OFFICE O/P EST LOW 20 MIN: CPT | Mod: TH

## 2021-02-10 PROCEDURE — 99072 ADDL SUPL MATRL&STAF TM PHE: CPT

## 2021-02-11 ENCOUNTER — APPOINTMENT (OUTPATIENT)
Dept: ANTEPARTUM | Facility: CLINIC | Age: 36
End: 2021-02-11
Payer: MEDICAID

## 2021-02-11 ENCOUNTER — ASOB RESULT (OUTPATIENT)
Age: 36
End: 2021-02-11

## 2021-02-11 LAB
BILIRUB UR QL STRIP: NORMAL
GLUCOSE UR-MCNC: NORMAL
HCG UR QL: 0.2 EU/DL
HGB UR QL STRIP.AUTO: NORMAL
KETONES UR-MCNC: NORMAL
LEUKOCYTE ESTERASE UR QL STRIP: NORMAL
NITRITE UR QL STRIP: NORMAL
PH UR STRIP: 6
PROT UR STRIP-MCNC: NORMAL
SP GR UR STRIP: 1.01

## 2021-02-11 PROCEDURE — 76820 UMBILICAL ARTERY ECHO: CPT

## 2021-02-11 PROCEDURE — 76818 FETAL BIOPHYS PROFILE W/NST: CPT

## 2021-02-11 PROCEDURE — 99072 ADDL SUPL MATRL&STAF TM PHE: CPT

## 2021-02-12 ENCOUNTER — ASOB RESULT (OUTPATIENT)
Age: 36
End: 2021-02-12

## 2021-02-12 ENCOUNTER — APPOINTMENT (OUTPATIENT)
Dept: MATERNAL FETAL MEDICINE | Facility: CLINIC | Age: 36
End: 2021-02-12
Payer: MEDICAID

## 2021-02-12 PROCEDURE — G0108 DIAB MANAGE TRN  PER INDIV: CPT | Mod: 95

## 2021-02-16 ENCOUNTER — NON-APPOINTMENT (OUTPATIENT)
Age: 36
End: 2021-02-16

## 2021-02-17 ENCOUNTER — NON-APPOINTMENT (OUTPATIENT)
Age: 36
End: 2021-02-17

## 2021-02-17 ENCOUNTER — APPOINTMENT (OUTPATIENT)
Dept: OBGYN | Facility: CLINIC | Age: 36
End: 2021-02-17
Payer: MEDICAID

## 2021-02-17 VITALS
SYSTOLIC BLOOD PRESSURE: 118 MMHG | WEIGHT: 186 LBS | DIASTOLIC BLOOD PRESSURE: 82 MMHG | BODY MASS INDEX: 31.76 KG/M2 | HEIGHT: 64 IN

## 2021-02-17 PROCEDURE — 99072 ADDL SUPL MATRL&STAF TM PHE: CPT

## 2021-02-17 PROCEDURE — 99213 OFFICE O/P EST LOW 20 MIN: CPT | Mod: TH

## 2021-02-18 ENCOUNTER — APPOINTMENT (OUTPATIENT)
Dept: ANTEPARTUM | Facility: CLINIC | Age: 36
End: 2021-02-18
Payer: MEDICAID

## 2021-02-18 ENCOUNTER — ASOB RESULT (OUTPATIENT)
Age: 36
End: 2021-02-18

## 2021-02-18 PROCEDURE — 76820 UMBILICAL ARTERY ECHO: CPT

## 2021-02-18 PROCEDURE — 76818 FETAL BIOPHYS PROFILE W/NST: CPT

## 2021-02-18 PROCEDURE — 99072 ADDL SUPL MATRL&STAF TM PHE: CPT

## 2021-02-19 ENCOUNTER — APPOINTMENT (OUTPATIENT)
Dept: MATERNAL FETAL MEDICINE | Facility: CLINIC | Age: 36
End: 2021-02-19
Payer: MEDICAID

## 2021-02-19 ENCOUNTER — ASOB RESULT (OUTPATIENT)
Age: 36
End: 2021-02-19

## 2021-02-19 LAB
BILIRUB UR QL STRIP: NORMAL
GLUCOSE UR-MCNC: NORMAL
HCG UR QL: 0.2 EU/DL
HGB UR QL STRIP.AUTO: NORMAL
KETONES UR-MCNC: NORMAL
LEUKOCYTE ESTERASE UR QL STRIP: NORMAL
NITRITE UR QL STRIP: NORMAL
PH UR STRIP: 6
PROT UR STRIP-MCNC: NORMAL
SP GR UR STRIP: 1

## 2021-02-19 PROCEDURE — G0108 DIAB MANAGE TRN  PER INDIV: CPT | Mod: 95

## 2021-02-24 ENCOUNTER — NON-APPOINTMENT (OUTPATIENT)
Age: 36
End: 2021-02-24

## 2021-02-24 ENCOUNTER — APPOINTMENT (OUTPATIENT)
Dept: OBGYN | Facility: CLINIC | Age: 36
End: 2021-02-24
Payer: MEDICAID

## 2021-02-24 VITALS
SYSTOLIC BLOOD PRESSURE: 118 MMHG | BODY MASS INDEX: 32.61 KG/M2 | WEIGHT: 191 LBS | DIASTOLIC BLOOD PRESSURE: 74 MMHG | HEIGHT: 64 IN

## 2021-02-24 PROCEDURE — 99072 ADDL SUPL MATRL&STAF TM PHE: CPT

## 2021-02-24 PROCEDURE — 99213 OFFICE O/P EST LOW 20 MIN: CPT | Mod: TH

## 2021-02-26 ENCOUNTER — APPOINTMENT (OUTPATIENT)
Dept: ANTEPARTUM | Facility: CLINIC | Age: 36
End: 2021-02-26
Payer: MEDICAID

## 2021-02-26 ENCOUNTER — ASOB RESULT (OUTPATIENT)
Age: 36
End: 2021-02-26

## 2021-02-26 ENCOUNTER — APPOINTMENT (OUTPATIENT)
Dept: DISASTER EMERGENCY | Facility: CLINIC | Age: 36
End: 2021-02-26

## 2021-02-26 LAB
BILIRUB UR QL STRIP: NORMAL
GLUCOSE UR-MCNC: NORMAL
HCG UR QL: 0.2 EU/DL
HGB UR QL STRIP.AUTO: NORMAL
KETONES UR-MCNC: NORMAL
LEUKOCYTE ESTERASE UR QL STRIP: NORMAL
NITRITE UR QL STRIP: NORMAL
PH UR STRIP: 6.5
PROT UR STRIP-MCNC: NORMAL
SP GR UR STRIP: 1

## 2021-02-26 PROCEDURE — 76818 FETAL BIOPHYS PROFILE W/NST: CPT

## 2021-02-26 PROCEDURE — 76816 OB US FOLLOW-UP PER FETUS: CPT

## 2021-02-26 PROCEDURE — 99072 ADDL SUPL MATRL&STAF TM PHE: CPT

## 2021-02-26 PROCEDURE — 76820 UMBILICAL ARTERY ECHO: CPT

## 2021-02-27 LAB — SARS-COV-2 N GENE NPH QL NAA+PROBE: NOT DETECTED

## 2021-03-03 ENCOUNTER — APPOINTMENT (OUTPATIENT)
Dept: OBGYN | Facility: CLINIC | Age: 36
End: 2021-03-03

## 2021-03-04 ENCOUNTER — INPATIENT (INPATIENT)
Facility: HOSPITAL | Age: 36
LOS: 1 days | Discharge: HOME | End: 2021-03-06
Attending: OBSTETRICS & GYNECOLOGY | Admitting: OBSTETRICS & GYNECOLOGY
Payer: MEDICAID

## 2021-03-04 VITALS — TEMPERATURE: 98 F

## 2021-03-04 DIAGNOSIS — O24.419 GESTATIONAL DIABETES MELLITUS IN PREGNANCY, UNSPECIFIED CONTROL: ICD-10-CM

## 2021-03-04 LAB
BASOPHILS # BLD AUTO: 0.03 K/UL — SIGNIFICANT CHANGE UP (ref 0–0.2)
BASOPHILS NFR BLD AUTO: 0.3 % — SIGNIFICANT CHANGE UP (ref 0–2)
BLD GP AB SCN SERPL QL: NEGATIVE — SIGNIFICANT CHANGE UP
EOSINOPHIL # BLD AUTO: 0.1 K/UL — SIGNIFICANT CHANGE UP (ref 0–0.5)
EOSINOPHIL NFR BLD AUTO: 1 % — SIGNIFICANT CHANGE UP (ref 0–6)
GLUCOSE BLDC GLUCOMTR-MCNC: 73 MG/DL — SIGNIFICANT CHANGE UP (ref 70–99)
GLUCOSE BLDC GLUCOMTR-MCNC: 79 MG/DL — SIGNIFICANT CHANGE UP (ref 70–99)
GLUCOSE BLDC GLUCOMTR-MCNC: 81 MG/DL — SIGNIFICANT CHANGE UP (ref 70–99)
GLUCOSE BLDC GLUCOMTR-MCNC: 89 MG/DL — SIGNIFICANT CHANGE UP (ref 70–99)
GLUCOSE BLDC GLUCOMTR-MCNC: 94 MG/DL — SIGNIFICANT CHANGE UP (ref 70–99)
GLUCOSE BLDC GLUCOMTR-MCNC: 95 MG/DL — SIGNIFICANT CHANGE UP (ref 70–99)
HCT VFR BLD CALC: 37.3 % — SIGNIFICANT CHANGE UP (ref 34.5–45)
HGB BLD-MCNC: 12.2 G/DL — SIGNIFICANT CHANGE UP (ref 11.5–15.5)
IANC: 6.71 K/UL — SIGNIFICANT CHANGE UP (ref 1.5–8.5)
IMM GRANULOCYTES NFR BLD AUTO: 0.6 % — SIGNIFICANT CHANGE UP (ref 0–1.5)
LYMPHOCYTES # BLD AUTO: 2.1 K/UL — SIGNIFICANT CHANGE UP (ref 1–3.3)
LYMPHOCYTES # BLD AUTO: 21.7 % — SIGNIFICANT CHANGE UP (ref 13–44)
MCHC RBC-ENTMCNC: 29 PG — SIGNIFICANT CHANGE UP (ref 27–34)
MCHC RBC-ENTMCNC: 32.7 GM/DL — SIGNIFICANT CHANGE UP (ref 32–36)
MCV RBC AUTO: 88.6 FL — SIGNIFICANT CHANGE UP (ref 80–100)
MONOCYTES # BLD AUTO: 0.68 K/UL — SIGNIFICANT CHANGE UP (ref 0–0.9)
MONOCYTES NFR BLD AUTO: 7 % — SIGNIFICANT CHANGE UP (ref 2–14)
NEUTROPHILS # BLD AUTO: 6.71 K/UL — SIGNIFICANT CHANGE UP (ref 1.8–7.4)
NEUTROPHILS NFR BLD AUTO: 69.4 % — SIGNIFICANT CHANGE UP (ref 43–77)
NRBC # BLD: 0 /100 WBCS — SIGNIFICANT CHANGE UP
NRBC # FLD: 0 K/UL — SIGNIFICANT CHANGE UP
PLATELET # BLD AUTO: 185 K/UL — SIGNIFICANT CHANGE UP (ref 150–400)
RBC # BLD: 4.21 M/UL — SIGNIFICANT CHANGE UP (ref 3.8–5.2)
RBC # FLD: 14.7 % — HIGH (ref 10.3–14.5)
RH IG SCN BLD-IMP: POSITIVE — SIGNIFICANT CHANGE UP
SARS-COV-2 IGG SERPL QL IA: NEGATIVE — SIGNIFICANT CHANGE UP
SARS-COV-2 IGM SERPL IA-ACNC: 0.07 INDEX — SIGNIFICANT CHANGE UP
SARS-COV-2 RNA SPEC QL NAA+PROBE: SIGNIFICANT CHANGE UP
T PALLIDUM AB TITR SER: NEGATIVE — SIGNIFICANT CHANGE UP
WBC # BLD: 9.68 K/UL — SIGNIFICANT CHANGE UP (ref 3.8–10.5)
WBC # FLD AUTO: 9.68 K/UL — SIGNIFICANT CHANGE UP (ref 3.8–10.5)

## 2021-03-04 RX ORDER — OXYTOCIN 10 UNIT/ML
333.33 VIAL (ML) INJECTION
Qty: 20 | Refills: 0 | Status: DISCONTINUED | OUTPATIENT
Start: 2021-03-04 | End: 2021-03-05

## 2021-03-04 RX ORDER — ALBUTEROL 90 UG/1
2 AEROSOL, METERED ORAL EVERY 6 HOURS
Refills: 0 | Status: DISCONTINUED | OUTPATIENT
Start: 2021-03-04 | End: 2021-03-06

## 2021-03-04 RX ORDER — SODIUM CHLORIDE 9 MG/ML
1000 INJECTION, SOLUTION INTRAVENOUS
Refills: 0 | Status: DISCONTINUED | OUTPATIENT
Start: 2021-03-04 | End: 2021-03-05

## 2021-03-04 RX ORDER — SODIUM CHLORIDE 9 MG/ML
1000 INJECTION INTRAMUSCULAR; INTRAVENOUS; SUBCUTANEOUS
Refills: 0 | Status: DISCONTINUED | OUTPATIENT
Start: 2021-03-04 | End: 2021-03-05

## 2021-03-04 RX ORDER — OXYTOCIN 10 UNIT/ML
2 VIAL (ML) INJECTION
Qty: 30 | Refills: 0 | Status: DISCONTINUED | OUTPATIENT
Start: 2021-03-04 | End: 2021-03-05

## 2021-03-04 RX ORDER — CITRIC ACID/SODIUM CITRATE 300-500 MG
15 SOLUTION, ORAL ORAL EVERY 6 HOURS
Refills: 0 | Status: DISCONTINUED | OUTPATIENT
Start: 2021-03-04 | End: 2021-03-05

## 2021-03-04 RX ADMIN — SODIUM CHLORIDE 125 MILLILITER(S): 9 INJECTION, SOLUTION INTRAVENOUS at 19:36

## 2021-03-04 RX ADMIN — Medication 2 MILLIUNIT(S)/MIN: at 16:56

## 2021-03-04 RX ADMIN — SODIUM CHLORIDE 125 MILLILITER(S): 9 INJECTION, SOLUTION INTRAVENOUS at 15:27

## 2021-03-04 NOTE — OB PROVIDER H&P - ATTENDING COMMENTS
Agree with above. Patient presenting for scheduled IOL for GDMA2. Plan for PO cytotec and CB.   SHARI Chamberlain MD

## 2021-03-04 NOTE — CHART NOTE - NSCHARTNOTEFT_GEN_A_CORE
R1 OB Progress Note    Patient seen and evaluated at bedside.      T(C): 36.4 (03-04-21 @ 14:30), Max: 36.8 (03-04-21 @ 02:21)  HR: 69 (03-04-21 @ 14:30) (69 - 88)  BP: 117/70 (03-04-21 @ 14:30) (111/68 - 131/87)  RR: 16 (03-04-21 @ 03:11) (16 - 16)  SpO2: --    SVE: 3/60/-3    EFM: 135, moderate, + accels, occasional variables that resolve with repositioning.    [place]:  irregular    A/P 35y P1 admitted for IOL for A2   -Labor: switch to Pit when able  -Fetus: overall cat 1  -Hemalatha Martinez, PGY1  d/w Dr. Molina
OBGYN Attending    Lat entry due to pt care.  Pt assessed at 830am.  Discussed using Cook Catheter Cervical Balloon in addition to PO Cytotec for IOL and discussed potentially shortening induction time.  Pt still declining.  Will continue PO Cytotec.    Walter Molina MD

## 2021-03-04 NOTE — OB RN PATIENT PROFILE - 'COMMUNITY AGENCIES/SUPPORT GROUPS, OB PROFILE
"mouth pain" reports having throat pain starting friday night, with fever, and sore throat. Denies nausea, vomiting, fever, chills. N/A

## 2021-03-04 NOTE — OB RN PATIENT PROFILE - PRO RUBELLA INFANT
CORTISONE INJECTION  Dr. Sunday Coronado      Injection of an anti-inflammatory steroid like \"cortisone\" allows getting a concentrated amount of medication right into the site of inflammation and irritation.  This can break the pain-inflammation cycle.  While the medication may only last a couple of weeks, the effect may last months or longer.  In some cases, cortisone may not be beneficial. Repeated injury or overuse can start the inflammation and irritation cycle all over again.     WHAT SHOULD I EXPECT ?  The Cortisone preparation is usually mixed with local anesthetic to help for pain the first few hours.  The joint may feel full but motion and strength will not be affected.  While the anti-inflammatory effect starts working right away, it may be 3-7 days before a noticeable effect is felt.  The maximal benefit is usually felt within the first two weeks.   You may resume normal activity.  WHAT ARE THE POSSIBLE SIDE EFFECTS ?  * Pain:  Most often the injection is very well tolerated.  Sometimes patients get an increase in pain the next couple of days.  It usually lasts less than 24-36 hours.  This can be controlled with ice, rest, and anti-inflammatory pills (ibuprofen, Aleve, Advil).   * Blood Sugar elevations can occur in people whom are diabetics.  This usually will normalize within a few days.  Patients with diabetes should monitor blood sugar levels.   * Facial flushing occurs in less than 1% of patients and usually resolves within the first 24-36 hours.  * Infection is a rare complication.  Call if increased pain, redness, warmth, and swelling occurs at the injection site.  * Tendon rupture is very rare but can occur.  For this reason, I limit injections to 3 times maximum per site per lifetime.    Contact our office if any problems or concerns.  Office hours are 8:00 am to 5:00 pm Monday through Friday.  If it is urgent that you speak with someone outside of these hours, our [facility] Call Center  will be able to assist you.  You can reach the office by calling the [place] central scheduling line at [phone].       We want to give the best care possible.     If you receive a survey from our office, please take the time to fill out the survey and return it in the envelope provided.    Your feedback helps us know how we are doing and we really appreciate it.     Sunday Coronado MD  [phone]         immune

## 2021-03-04 NOTE — OB PROVIDER H&P - HISTORY OF PRESENT ILLNESS
R1 H&P    34 y/o  at 40w1d IOL for GDMA2. Denies VB, LOF, ctx. +FM  Prenatal course c/b GDMA2. On admelog  and basaglar 40u qhs. Blood sugars have overall been well controlled however in the past week, 1h PP is 150s-160s. Fasting in 80s.  GBS negative  EFW 3900    OBHx:  G1  eTOP (DVC)  G2  FT  7#8  G3  eTOP (pills)  GynHx: denies h/o abnormal paps, STI's, fibroids, cysts  PMHx: denies  PSHx:  DVC  Med: admelog , basaglar 40qhs, PNV  All: shrimp (swelling/rash)  SH: denies alcohol, tobacco, or drug use  Psych: denies h/o anxiety or depression      EFH: 140s baseline, mod iván, +accels, -decels  [place]: quiet  VE: /60/-3  TAUS: vertex    A&P: 34 y/o  at 40w1d IOL for GDMA2. GBS negative. EFW 3900.  Labor: admit to L&D  -PO cytotec  -routine labs  -EFM/toco  -NPO, IV hydration  Fetal: cat 1 tracing, fetal status reassuring      Discussed with Dr. Bulmaro Neri PGY1

## 2021-03-04 NOTE — OB PROVIDER IHI INDUCTION/AUGMENTATION NOTE - LABOR: CERVICAL CONSISTENCY
CHIEF COMPLAINT:   Runny nose, vomiting and cough for 3 days.   HISTORY OF PRESENT ILLNESS:   The patient is 20 year old female who started to have runny nose with clear discharge,and mild sore throat and coughing for about 3 days. The patient does not have any chest pain, no shortness of breath, no abdominal pain, vomited yesterday twice but nothing today and she had fever 100 the last 2 days and today the fever is broken. No dizziness or vertigo. she  denies any skin rash.   The cough is dry and sometimes with white clear phlegm.   No ear pain, no urinary symp. No dysuria or hematuria.  There is no diarrhea and she has generalized body ache  PAST MEDICAL HISTORY:  Past Medical History   Diagnosis Date   • RAD (reactive airway disease)          MEDICATIONS and ALLERGIES:  Reviewed and updated per epic.    PAST SURGERIES:  History reviewed. No pertinent past surgical history.       SOCIAL HISTORY :   Social History     Social History   • Marital status: Significant other     Spouse name: N/A   • Number of children: N/A   • Years of education: N/A     Occupational History   • Not on file.     Social History Main Topics   • Smoking status: Never Smoker   • Smokeless tobacco: Never Used   • Alcohol use Yes   • Drug use: No   • Sexual activity: Yes     Partners: Male     Other Topics Concern   • Not on file     Social History Narrative   • No narrative on file     Family History   Problem Relation Age of Onset   • Diabetes Paternal Aunt          PHYSICAL EXAMINATION:   VITAL SIGNS:Blood pressure 96/68, pulse 76, temperature 98.4 °F (36.9 °C), temperature source Oral, resp. rate 16, height 5' 9\" (1.753 m), weight 77.6 kg, last menstrual period 01/29/2017, SpO2 98 %.    GENERAL: The patient is alert, oriented x3, in no acute distress. HEENT: Pupils equal, round, reactive to light and accommodation.   Extraocular muscles intact. Conjunctivae noninjected. Eardrums are pink bilaterally. External ears are normal. Nasal mucosa  is normal. There is no maxillary or frontal sinus tenderness. Throat not injected. Mouth without ulcers. Tongue is central. Uvula is midline.   CARDIOVASCULAR: Regular rate and rhythm. No murmurs, rubs or gallops. LUNGS: Clear to auscultation. Normal respiratory effort.   ABDOMEN: Soft, nontender to palpation. Bowel sounds present. No hepatosplenomegaly. No hernias present.   NECK: No neck or supraclavicular lymphadenopathy. No thyromegaly. No jugular venous distention or bruits. No stiffness noted. No   submandibular or anterior cervical lymphadenopathy.   PSYCHIATRIC: No depression.   EXTREMITIES: No edema or varicose veins.   ASSESSMENT AND PLAN:   The patient is a 20 year old female with upper respiratory tract infection and cough, most probably viral etiology.   For that, I started the patient on vitamin C 1 g a day and Robitussin-DM and the patient was instructed to push fluids.   Also for the patient's vomiting recommend the patient to eat light and eat small meals and frequently  The patient was approved to be off work from 2-14 and the patient can return to work on 2/18/17  The patient asthma mild intermittent asthma the patient use albuterol when needed   firm

## 2021-03-04 NOTE — OB PROVIDER LABOR PROGRESS NOTE - ASSESSMENT
Pt is a 36yo  being induced for A2, now with constant pressure.    -pt AROM'd, will start pushing when urge comes    Gay Kennedy,PGY1  Seen w/ Dr. Merchant  
34 y/o  IOL for GDMA2. s/p PO cytotec. Pitocin currently at 10.  - c/w kendall Neri PGY1

## 2021-03-05 LAB
GLUCOSE BLDC GLUCOMTR-MCNC: 124 MG/DL — HIGH (ref 70–99)
GLUCOSE BLDC GLUCOMTR-MCNC: 129 MG/DL — HIGH (ref 70–99)
GLUCOSE BLDC GLUCOMTR-MCNC: 144 MG/DL — HIGH (ref 70–99)
GLUCOSE BLDC GLUCOMTR-MCNC: 97 MG/DL — SIGNIFICANT CHANGE UP (ref 70–99)

## 2021-03-05 PROCEDURE — 59409 OBSTETRICAL CARE: CPT | Mod: U7

## 2021-03-05 RX ORDER — TETANUS TOXOID, REDUCED DIPHTHERIA TOXOID AND ACELLULAR PERTUSSIS VACCINE, ADSORBED 5; 2.5; 8; 8; 2.5 [IU]/.5ML; [IU]/.5ML; UG/.5ML; UG/.5ML; UG/.5ML
0.5 SUSPENSION INTRAMUSCULAR ONCE
Refills: 0 | Status: DISCONTINUED | OUTPATIENT
Start: 2021-03-05 | End: 2021-03-06

## 2021-03-05 RX ORDER — BENZOCAINE 10 %
1 GEL (GRAM) MUCOUS MEMBRANE EVERY 6 HOURS
Refills: 0 | Status: DISCONTINUED | OUTPATIENT
Start: 2021-03-05 | End: 2021-03-06

## 2021-03-05 RX ORDER — IBUPROFEN 200 MG
600 TABLET ORAL EVERY 6 HOURS
Refills: 0 | Status: COMPLETED | OUTPATIENT
Start: 2021-03-05 | End: 2022-02-01

## 2021-03-05 RX ORDER — PRAMOXINE HYDROCHLORIDE 150 MG/15G
1 AEROSOL, FOAM RECTAL EVERY 4 HOURS
Refills: 0 | Status: DISCONTINUED | OUTPATIENT
Start: 2021-03-05 | End: 2021-03-06

## 2021-03-05 RX ORDER — HYDROCORTISONE 1 %
1 OINTMENT (GRAM) TOPICAL EVERY 6 HOURS
Refills: 0 | Status: DISCONTINUED | OUTPATIENT
Start: 2021-03-05 | End: 2021-03-06

## 2021-03-05 RX ORDER — KETOROLAC TROMETHAMINE 30 MG/ML
30 SYRINGE (ML) INJECTION ONCE
Refills: 0 | Status: DISCONTINUED | OUTPATIENT
Start: 2021-03-05 | End: 2021-03-05

## 2021-03-05 RX ORDER — MAGNESIUM HYDROXIDE 400 MG/1
30 TABLET, CHEWABLE ORAL
Refills: 0 | Status: DISCONTINUED | OUTPATIENT
Start: 2021-03-05 | End: 2021-03-06

## 2021-03-05 RX ORDER — OXYCODONE HYDROCHLORIDE 5 MG/1
5 TABLET ORAL ONCE
Refills: 0 | Status: DISCONTINUED | OUTPATIENT
Start: 2021-03-05 | End: 2021-03-06

## 2021-03-05 RX ORDER — OXYTOCIN 10 UNIT/ML
333.33 VIAL (ML) INJECTION
Qty: 20 | Refills: 0 | Status: DISCONTINUED | OUTPATIENT
Start: 2021-03-05 | End: 2021-03-05

## 2021-03-05 RX ORDER — ALBUTEROL 90 UG/1
2 AEROSOL, METERED ORAL
Qty: 0 | Refills: 0 | DISCHARGE

## 2021-03-05 RX ORDER — SODIUM CHLORIDE 9 MG/ML
3 INJECTION INTRAMUSCULAR; INTRAVENOUS; SUBCUTANEOUS EVERY 8 HOURS
Refills: 0 | Status: DISCONTINUED | OUTPATIENT
Start: 2021-03-05 | End: 2021-03-06

## 2021-03-05 RX ORDER — IBUPROFEN 200 MG
600 TABLET ORAL EVERY 6 HOURS
Refills: 0 | Status: DISCONTINUED | OUTPATIENT
Start: 2021-03-05 | End: 2021-03-06

## 2021-03-05 RX ORDER — DIPHENHYDRAMINE HCL 50 MG
25 CAPSULE ORAL EVERY 6 HOURS
Refills: 0 | Status: DISCONTINUED | OUTPATIENT
Start: 2021-03-05 | End: 2021-03-06

## 2021-03-05 RX ORDER — LANOLIN
1 OINTMENT (GRAM) TOPICAL EVERY 6 HOURS
Refills: 0 | Status: DISCONTINUED | OUTPATIENT
Start: 2021-03-05 | End: 2021-03-06

## 2021-03-05 RX ORDER — ACETAMINOPHEN 500 MG
975 TABLET ORAL
Refills: 0 | Status: DISCONTINUED | OUTPATIENT
Start: 2021-03-05 | End: 2021-03-06

## 2021-03-05 RX ORDER — SIMETHICONE 80 MG/1
80 TABLET, CHEWABLE ORAL EVERY 4 HOURS
Refills: 0 | Status: DISCONTINUED | OUTPATIENT
Start: 2021-03-05 | End: 2021-03-06

## 2021-03-05 RX ORDER — DIBUCAINE 1 %
1 OINTMENT (GRAM) RECTAL EVERY 6 HOURS
Refills: 0 | Status: DISCONTINUED | OUTPATIENT
Start: 2021-03-05 | End: 2021-03-06

## 2021-03-05 RX ORDER — OXYCODONE HYDROCHLORIDE 5 MG/1
5 TABLET ORAL
Refills: 0 | Status: DISCONTINUED | OUTPATIENT
Start: 2021-03-05 | End: 2021-03-06

## 2021-03-05 RX ORDER — IBUPROFEN 200 MG
1 TABLET ORAL
Qty: 0 | Refills: 0 | DISCHARGE
Start: 2021-03-05

## 2021-03-05 RX ORDER — AER TRAVELER 0.5 G/1
1 SOLUTION RECTAL; TOPICAL EVERY 4 HOURS
Refills: 0 | Status: DISCONTINUED | OUTPATIENT
Start: 2021-03-05 | End: 2021-03-06

## 2021-03-05 RX ORDER — ACETAMINOPHEN 500 MG
3 TABLET ORAL
Qty: 0 | Refills: 0 | DISCHARGE
Start: 2021-03-05

## 2021-03-05 RX ADMIN — Medication 30 MILLIGRAM(S): at 02:25

## 2021-03-05 RX ADMIN — Medication 975 MILLIGRAM(S): at 12:26

## 2021-03-05 RX ADMIN — Medication 1 TABLET(S): at 12:45

## 2021-03-05 RX ADMIN — SODIUM CHLORIDE 3 MILLILITER(S): 9 INJECTION INTRAMUSCULAR; INTRAVENOUS; SUBCUTANEOUS at 14:00

## 2021-03-05 RX ADMIN — Medication 1000 MILLIUNIT(S)/MIN: at 02:25

## 2021-03-05 RX ADMIN — Medication 975 MILLIGRAM(S): at 18:10

## 2021-03-05 RX ADMIN — SODIUM CHLORIDE 3 MILLILITER(S): 9 INJECTION INTRAMUSCULAR; INTRAVENOUS; SUBCUTANEOUS at 22:30

## 2021-03-05 RX ADMIN — Medication 975 MILLIGRAM(S): at 06:16

## 2021-03-05 RX ADMIN — Medication 975 MILLIGRAM(S): at 18:40

## 2021-03-05 RX ADMIN — Medication 30 MILLIGRAM(S): at 02:35

## 2021-03-05 RX ADMIN — SODIUM CHLORIDE 3 MILLILITER(S): 9 INJECTION INTRAMUSCULAR; INTRAVENOUS; SUBCUTANEOUS at 06:09

## 2021-03-05 RX ADMIN — Medication 975 MILLIGRAM(S): at 06:56

## 2021-03-05 RX ADMIN — Medication 975 MILLIGRAM(S): at 12:56

## 2021-03-05 NOTE — OB NEONATOLOGY/PEDIATRICIAN DELIVERY SUMMARY - NSPEDSNEONOTESA_OBGYN_ALL_OB_FT
Baby is a 40.1 wk M born to a 35 y/o  via , peds called for meconium. Maternal hx of GDMA2. BT O+. COVID neg. PNL neg/NR/immune. GBS neg on . AROM at delivery, mec fluids. Baby born crying and vigorous. WDSS. APGARS 9/9. Will be . EOS 0.07. Mother consents Hep B. Baby is a 40.1 wk F born to a 36 y/o  via , peds called for meconium. Maternal hx of GDMA2. BT O+. COVID neg. PNL neg/NR/immune. GBS neg on . AROM at delivery, mec fluids. Baby born crying and vigorous. WDSS. APGARS 9/9. Will be . EOS 0.07. Mother consents Hep B.

## 2021-03-05 NOTE — OB PROVIDER DELIVERY SUMMARY - NSPROVIDERDELIVERYNOTE_OBGYN_ALL_OB_FT
Pt fully dilated and pushing.  viable female infant over RML episiotomy. Delayed cord clamping. Cord gases sent. Placenta delivered spontaneously intact. Episiotomy repaired with 2-0 chromic. Vaginal and rectal vault clear.

## 2021-03-05 NOTE — OB RN DELIVERY SUMMARY - NS_SEPSISRSKCALC_OBGYN_ALL_OB_FT
EOS calculated successfully. EOS Risk Factor: 0.5/1000 live births ([facility] national incidence); GA=40w2d; Temp=98.6; ROM=1.667; GBS='Negative'; Antibiotics='No antibiotics or any antibiotics < 2 hrs prior to birth'

## 2021-03-06 ENCOUNTER — TRANSCRIPTION ENCOUNTER (OUTPATIENT)
Age: 36
End: 2021-03-06

## 2021-03-06 VITALS
OXYGEN SATURATION: 100 % | SYSTOLIC BLOOD PRESSURE: 131 MMHG | HEART RATE: 70 BPM | RESPIRATION RATE: 18 BRPM | DIASTOLIC BLOOD PRESSURE: 76 MMHG | TEMPERATURE: 98 F

## 2021-03-06 RX ADMIN — SODIUM CHLORIDE 3 MILLILITER(S): 9 INJECTION INTRAMUSCULAR; INTRAVENOUS; SUBCUTANEOUS at 14:00

## 2021-03-06 RX ADMIN — Medication 975 MILLIGRAM(S): at 02:00

## 2021-03-06 RX ADMIN — Medication 600 MILLIGRAM(S): at 13:15

## 2021-03-06 RX ADMIN — Medication 1 TABLET(S): at 13:18

## 2021-03-06 RX ADMIN — SODIUM CHLORIDE 3 MILLILITER(S): 9 INJECTION INTRAMUSCULAR; INTRAVENOUS; SUBCUTANEOUS at 06:24

## 2021-03-06 RX ADMIN — Medication 975 MILLIGRAM(S): at 01:30

## 2021-03-06 RX ADMIN — Medication 600 MILLIGRAM(S): at 13:45

## 2021-03-06 NOTE — DISCHARGE NOTE OB - PATIENT PORTAL LINK FT
You can access the FollowMyHealth Patient Portal offered by [facility] by registering at the following website: [email or web address]. By joining [facility]’s FollowMyHealth portal, you will also be able to view your health information using other applications (apps) compatible with our system.

## 2021-03-06 NOTE — PROGRESS NOTE ADULT - SUBJECTIVE AND OBJECTIVE BOX
Pt without complaints  ICU Vital Signs Last 24 Hrs  T(C): 36.7 (06 Mar 2021 14:30), Max: 36.8 (05 Mar 2021 18:05)  T(F): 98.1 (06 Mar 2021 14:30), Max: 98.2 (05 Mar 2021 18:05)  HR: 70 (06 Mar 2021 14:30) (70 - 82)  BP: 131/76 (06 Mar 2021 14:30) (97/72 - 131/76)  BP(mean): --  ABP: --  ABP(mean): --  RR: 18 (06 Mar 2021 14:30) (18 - 19)  SpO2: 100% (06 Mar 2021 14:30) (98% - 100%)  Blood Type: O Positive  abdomen soft, non-tender, fundus firm  extremities non tender  s/p nsd  doing well  d/c home  
Anesthesia Post-Op Note    POD #1 s/p Vaginal Delivery    Patient is doing well.  OOBAA.  Tolerating clears.  Pain is tolerable.  No residual anesthetic issues or complications noted.    Luma Aguilar CRNA

## 2021-03-06 NOTE — DISCHARGE NOTE OB - CARE PLAN
Principal Discharge DX:	Normal vaginal delivery  Goal:	recovery  Assessment and plan of treatment:	pelvic rest   follow up 6 weeks

## 2021-03-06 NOTE — DISCHARGE NOTE OB - MATERIALS PROVIDED
Vaccinations/[facility]  Screening Program/  Immunization Record/Bottle Feeding Log/Guide to Postpartum Care/[facility] Hearing Screen Program/Shaken Baby Prevention Handout/Birth Certificate Instructions/Discharge Medication Information for Patients and Families Pocket Guide

## 2021-03-06 NOTE — DISCHARGE NOTE OB - MEDICATION SUMMARY - MEDICATIONS TO TAKE
I will START or STAY ON the medications listed below when I get home from the hospital:    ibuprofen 600 mg oral tablet  -- 1 tab(s) by mouth every 6 hours, As Needed  -- Indication: For vaginal delivery    acetaminophen 325 mg oral tablet  -- 3 tab(s) by mouth , As Needed  -- Indication: For vaginal delivery    Prenatal Multivitamins with Folic Acid 1 mg oral tablet  -- 1 tab(s) by mouth once a day  -- Indication: For vaginal delivery

## 2021-03-06 NOTE — DISCHARGE NOTE OB - MEDICATION SUMMARY - MEDICATIONS TO STOP TAKING
I will STOP taking the medications listed below when I get home from the hospital:    Levemir FlexTouch 100 units/mL subcutaneous solution  -- 36 unit(s) subcutaneous once a day (at bedtime)    Admelog SoloStar 100 units/mL injectable solution  -- 14 units before breakfast  12 unit(s) injectable before lunch and dinner

## 2021-03-06 NOTE — DISCHARGE NOTE OB - CARE PROVIDER_API CALL
Walter Lopes)  [facility] Other  [address] [place] [address]  Phone: [phone]  Fax: [phone]  Follow Up Time:

## 2021-03-11 DIAGNOSIS — Z3A.29 29 WEEKS GESTATION OF PREGNANCY: ICD-10-CM

## 2021-03-11 DIAGNOSIS — O09.523 SUPERVISION OF ELDERLY MULTIGRAVIDA, THIRD TRIMESTER: ICD-10-CM

## 2021-03-11 DIAGNOSIS — O24.414 GESTATIONAL DIABETES MELLITUS IN PREGNANCY, INSULIN CONTROLLED: ICD-10-CM

## 2021-03-31 ENCOUNTER — APPOINTMENT (OUTPATIENT)
Dept: OBGYN | Facility: CLINIC | Age: 36
End: 2021-03-31
Payer: MEDICAID

## 2021-03-31 VITALS
SYSTOLIC BLOOD PRESSURE: 120 MMHG | HEIGHT: 64 IN | WEIGHT: 173 LBS | DIASTOLIC BLOOD PRESSURE: 64 MMHG | BODY MASS INDEX: 29.53 KG/M2

## 2021-03-31 PROCEDURE — 99072 ADDL SUPL MATRL&STAF TM PHE: CPT

## 2021-03-31 NOTE — HISTORY OF PRESENT ILLNESS
[Postpartum Follow Up] : postpartum follow up [Delivery Date: ___] : on [unfilled] [] : delivered by vaginal delivery [Intended Contraception] : Intended Contraception: [Back to Normal] : is back to normal in size [Mild] : mild vaginal bleeding [Normal] : the vagina was normal [Not Done] : Examination of breasts not done [Doing Well] : is doing well [No Sign of Infection] : is showing no signs of infection [Excellent Pain Control] : has excellent pain control [None] : None [Complications:___] : no complications [Breastfeeding] : currently nursing [Resumed Menses] : has not resumed her menses [Resumed [place]] : has not resumed intercourse [Condoms] : condoms [S/Sx PP Depression] : no signs/symptoms of postpartum depression [Erythema] : not erythematous [Cervix Sample Taken] : cervical sample not taken for a Pap smear [de-identified] : c/b GDMA2 [de-identified] : 75g GTT prescription given

## 2021-04-02 ENCOUNTER — APPOINTMENT (OUTPATIENT)
Dept: INTERNAL MEDICINE | Facility: CLINIC | Age: 36
End: 2021-04-02
Payer: MEDICAID

## 2021-04-02 VITALS
TEMPERATURE: 98.5 F | OXYGEN SATURATION: 98 % | HEIGHT: 64 IN | WEIGHT: 173.06 LBS | SYSTOLIC BLOOD PRESSURE: 124 MMHG | DIASTOLIC BLOOD PRESSURE: 82 MMHG | BODY MASS INDEX: 29.55 KG/M2 | HEART RATE: 75 BPM

## 2021-04-02 PROCEDURE — 99072 ADDL SUPL MATRL&STAF TM PHE: CPT

## 2021-04-02 PROCEDURE — 99213 OFFICE O/P EST LOW 20 MIN: CPT

## 2021-04-02 NOTE — ASSESSMENT
[FreeTextEntry1] : check fs glucose premeal\par given sliding scale insulin scale\par BPV discussed  with pt\par possbile endo f/u\par pt to f/u in 5-7 days

## 2021-04-02 NOTE — PHYSICAL EXAM
[Normal] : soft, non-tender, non-distended, no masses palpated, no HSM and normal bowel sounds [No Focal Deficits] : no focal deficits [de-identified] : positional vertigo

## 2021-04-02 NOTE — HISTORY OF PRESENT ILLNESS
[FreeTextEntry1] : dizzy [de-identified] : reviewed OB notes\par got dizzy last PM while breast feeding--like vertigo--had transient elevated bp and glucose\par was on lantus and short acting insulin but off several wks and states sugars have been in low 100's since her delivery\par pt is breast feeding

## 2021-04-08 ENCOUNTER — APPOINTMENT (OUTPATIENT)
Dept: INTERNAL MEDICINE | Facility: CLINIC | Age: 36
End: 2021-04-08
Payer: MEDICAID

## 2021-04-08 VITALS
OXYGEN SATURATION: 96 % | TEMPERATURE: 98.2 F | SYSTOLIC BLOOD PRESSURE: 128 MMHG | HEART RATE: 81 BPM | DIASTOLIC BLOOD PRESSURE: 88 MMHG | WEIGHT: 171 LBS | RESPIRATION RATE: 16 BRPM | BODY MASS INDEX: 29.19 KG/M2 | HEIGHT: 64 IN

## 2021-04-08 PROCEDURE — 99072 ADDL SUPL MATRL&STAF TM PHE: CPT

## 2021-04-08 PROCEDURE — 99213 OFFICE O/P EST LOW 20 MIN: CPT | Mod: 25

## 2021-04-08 NOTE — HISTORY OF PRESENT ILLNESS
[FreeTextEntry1] : f/u glucose [de-identified] : premeal glu am 130-135\par premeal midday 140-150\par premeal dinner ave 160's\par postmeals 160-170--\par averaging 18units of coverage insulin/day--was averaging 50units of coverage and 40 units of long acting insulin while pregnant\par post partum by 33 days--breast feeding

## 2021-04-08 NOTE — ASSESSMENT
[FreeTextEntry1] : start lantus 10units qhs and can advance by 2 units 1-2x wk for am glu>130\par labs\par can taper down on fs glu to 1-2x day with short acting coverage\par pt to contact provider or come in in 5-6 wks

## 2021-04-09 LAB
ALBUMIN SERPL ELPH-MCNC: 4.2 G/DL
ALP BLD-CCNC: 92 U/L
ALT SERPL-CCNC: 32 U/L
ANION GAP SERPL CALC-SCNC: 13 MMOL/L
AST SERPL-CCNC: 23 U/L
BILIRUB SERPL-MCNC: 0.7 MG/DL
BUN SERPL-MCNC: 12 MG/DL
C PEPTIDE SERPL-MCNC: 6.7 NG/ML
CALCIUM SERPL-MCNC: 9.9 MG/DL
CHLORIDE SERPL-SCNC: 102 MMOL/L
CO2 SERPL-SCNC: 24 MMOL/L
CREAT SERPL-MCNC: 0.56 MG/DL
ESTIMATED AVERAGE GLUCOSE: 137 MG/DL
GLUCOSE SERPL-MCNC: 175 MG/DL
HBA1C MFR BLD HPLC: 6.4 %
INSULIN SERPL-MCNC: 112 UU/ML
POTASSIUM SERPL-SCNC: 4.4 MMOL/L
PROT SERPL-MCNC: 7.2 G/DL
SODIUM SERPL-SCNC: 138 MMOL/L

## 2021-05-25 ENCOUNTER — APPOINTMENT (OUTPATIENT)
Dept: INTERNAL MEDICINE | Facility: CLINIC | Age: 36
End: 2021-05-25

## 2021-06-07 ENCOUNTER — EMERGENCY (EMERGENCY)
Facility: HOSPITAL | Age: 36
LOS: 1 days | Discharge: HOME | End: 2021-06-07
Attending: EMERGENCY MEDICINE | Admitting: EMERGENCY MEDICINE
Payer: MEDICAID

## 2021-06-07 VITALS
RESPIRATION RATE: 18 BRPM | SYSTOLIC BLOOD PRESSURE: 153 MMHG | HEIGHT: 65 IN | TEMPERATURE: 98 F | HEART RATE: 67 BPM | OXYGEN SATURATION: 100 % | DIASTOLIC BLOOD PRESSURE: 89 MMHG

## 2021-06-07 LAB
ALBUMIN SERPL ELPH-MCNC: 4.1 G/DL — SIGNIFICANT CHANGE UP (ref 3.3–5)
ALP SERPL-CCNC: 83 U/L — SIGNIFICANT CHANGE UP (ref 40–120)
ALT FLD-CCNC: 20 U/L — SIGNIFICANT CHANGE UP (ref 4–33)
ANION GAP SERPL CALC-SCNC: 13 MMOL/L — SIGNIFICANT CHANGE UP (ref 7–14)
AST SERPL-CCNC: 17 U/L — SIGNIFICANT CHANGE UP (ref 4–32)
BASOPHILS # BLD AUTO: 0.06 K/UL — SIGNIFICANT CHANGE UP (ref 0–0.2)
BASOPHILS NFR BLD AUTO: 0.7 % — SIGNIFICANT CHANGE UP (ref 0–2)
BILIRUB SERPL-MCNC: 0.4 MG/DL — SIGNIFICANT CHANGE UP (ref 0.2–1.2)
BLD GP AB SCN SERPL QL: NEGATIVE — SIGNIFICANT CHANGE UP
BUN SERPL-MCNC: 14 MG/DL — SIGNIFICANT CHANGE UP (ref 7–23)
CALCIUM SERPL-MCNC: 9 MG/DL — SIGNIFICANT CHANGE UP (ref 8.4–10.5)
CHLORIDE SERPL-SCNC: 104 MMOL/L — SIGNIFICANT CHANGE UP (ref 98–107)
CO2 SERPL-SCNC: 22 MMOL/L — SIGNIFICANT CHANGE UP (ref 22–31)
CREAT SERPL-MCNC: 0.67 MG/DL — SIGNIFICANT CHANGE UP (ref 0.5–1.3)
EOSINOPHIL # BLD AUTO: 0.17 K/UL — SIGNIFICANT CHANGE UP (ref 0–0.5)
EOSINOPHIL NFR BLD AUTO: 1.9 % — SIGNIFICANT CHANGE UP (ref 0–6)
GLUCOSE SERPL-MCNC: 127 MG/DL — HIGH (ref 70–99)
HCG SERPL-ACNC: <5 MIU/ML — SIGNIFICANT CHANGE UP
HCT VFR BLD CALC: 39.6 % — SIGNIFICANT CHANGE UP (ref 34.5–45)
HGB BLD-MCNC: 12.9 G/DL — SIGNIFICANT CHANGE UP (ref 11.5–15.5)
IANC: 7.09 K/UL — SIGNIFICANT CHANGE UP (ref 1.5–8.5)
IMM GRANULOCYTES NFR BLD AUTO: 0.3 % — SIGNIFICANT CHANGE UP (ref 0–1.5)
LYMPHOCYTES # BLD AUTO: 1.37 K/UL — SIGNIFICANT CHANGE UP (ref 1–3.3)
LYMPHOCYTES # BLD AUTO: 15.1 % — SIGNIFICANT CHANGE UP (ref 13–44)
MCHC RBC-ENTMCNC: 29.3 PG — SIGNIFICANT CHANGE UP (ref 27–34)
MCHC RBC-ENTMCNC: 32.6 GM/DL — SIGNIFICANT CHANGE UP (ref 32–36)
MCV RBC AUTO: 89.8 FL — SIGNIFICANT CHANGE UP (ref 80–100)
MONOCYTES # BLD AUTO: 0.37 K/UL — SIGNIFICANT CHANGE UP (ref 0–0.9)
MONOCYTES NFR BLD AUTO: 4.1 % — SIGNIFICANT CHANGE UP (ref 2–14)
NEUTROPHILS # BLD AUTO: 7.09 K/UL — SIGNIFICANT CHANGE UP (ref 1.8–7.4)
NEUTROPHILS NFR BLD AUTO: 77.9 % — HIGH (ref 43–77)
NRBC # BLD: 0 /100 WBCS — SIGNIFICANT CHANGE UP
NRBC # FLD: 0 K/UL — SIGNIFICANT CHANGE UP
PLATELET # BLD AUTO: 270 K/UL — SIGNIFICANT CHANGE UP (ref 150–400)
POTASSIUM SERPL-MCNC: 3.7 MMOL/L — SIGNIFICANT CHANGE UP (ref 3.5–5.3)
POTASSIUM SERPL-SCNC: 3.7 MMOL/L — SIGNIFICANT CHANGE UP (ref 3.5–5.3)
PROT SERPL-MCNC: 7.3 G/DL — SIGNIFICANT CHANGE UP (ref 6–8.3)
RBC # BLD: 4.41 M/UL — SIGNIFICANT CHANGE UP (ref 3.8–5.2)
RBC # FLD: 13.9 % — SIGNIFICANT CHANGE UP (ref 10.3–14.5)
RH IG SCN BLD-IMP: POSITIVE — SIGNIFICANT CHANGE UP
SODIUM SERPL-SCNC: 139 MMOL/L — SIGNIFICANT CHANGE UP (ref 135–145)
WBC # BLD: 9.09 K/UL — SIGNIFICANT CHANGE UP (ref 3.8–10.5)
WBC # FLD AUTO: 9.09 K/UL — SIGNIFICANT CHANGE UP (ref 3.8–10.5)

## 2021-06-07 PROCEDURE — 76830 TRANSVAGINAL US NON-OB: CPT | Mod: 26

## 2021-06-07 PROCEDURE — 99285 EMERGENCY DEPT VISIT HI MDM: CPT

## 2021-06-07 RX ORDER — MORPHINE SULFATE 50 MG/1
4 CAPSULE, EXTENDED RELEASE ORAL ONCE
Refills: 0 | Status: DISCONTINUED | OUTPATIENT
Start: 2021-06-07 | End: 2021-06-07

## 2021-06-07 RX ORDER — ONDANSETRON 8 MG/1
4 TABLET, FILM COATED ORAL ONCE
Refills: 0 | Status: COMPLETED | OUTPATIENT
Start: 2021-06-07 | End: 2021-06-07

## 2021-06-07 RX ADMIN — MORPHINE SULFATE 4 MILLIGRAM(S): 50 CAPSULE, EXTENDED RELEASE ORAL at 22:15

## 2021-06-07 RX ADMIN — MORPHINE SULFATE 4 MILLIGRAM(S): 50 CAPSULE, EXTENDED RELEASE ORAL at 23:50

## 2021-06-07 RX ADMIN — ONDANSETRON 4 MILLIGRAM(S): 8 TABLET, FILM COATED ORAL at 22:00

## 2021-06-07 RX ADMIN — MORPHINE SULFATE 4 MILLIGRAM(S): 50 CAPSULE, EXTENDED RELEASE ORAL at 22:00

## 2021-06-07 RX ADMIN — MORPHINE SULFATE 4 MILLIGRAM(S): 50 CAPSULE, EXTENDED RELEASE ORAL at 23:35

## 2021-06-07 NOTE — ED PROVIDER NOTE - OBJECTIVE STATEMENT
34yo F s/p  3/ just began her menstrual cycle again 2 days ago and having increasing vaginal bleeding 5 pads per day and worsening rlq/suprapubic pain w/ n/v. Did not have similar pain during menstrual cycle before her pregnancy. Denies fever, chills, cp, sob, back pain, urinary sx, diarrhea.

## 2021-06-07 NOTE — ED PROVIDER NOTE - PHYSICAL EXAMINATION
Gen: Well appearing, NAD  Head: NCAT  HEENT: PERRL, MMM, normal conjunctiva, anicteric, neck supple  Lung: CTAB, no adventitious sounds  CV: RRR, no murmurs  Abd: soft, NTND, no rebound or guarding, no CVAT  MSK: No edema, no visible deformities  Neuro: Moving all extremity grossly  Skin: Warm and dry, no evidence of rash  Psych: normal mood and affect Gen: uncomfortable appearing and vomiting x 1  Head: NCAT  HEENT: PERRL, MMM, normal conjunctiva, anicteric, neck supple  Lung: CTAB, no adventitious sounds  CV: RRR, no murmurs  Abd: soft, mildly ttp rlq and suprapubic, no rebound or guarding, no CVAT  MSK: No edema, no visible deformities  Neuro: Moving all extremity grossly  Skin: Warm and dry, no evidence of rash Gen: uncomfortable appearing and vomiting x 1  Head: NCAT  HEENT: PERRL, MMM, normal conjunctiva, anicteric, neck supple  Lung: CTAB, no adventitious sounds  CV: RRR, no murmurs  Abd: soft, mildly ttp rlq and suprapubic, no rebound or guarding, no CVAT  MSK: No edema, no visible deformities  Neuro: Moving all extremity grossly  Skin: Warm and dry, no evidence of rash  Pelvic: blood pooling in vault, mild R adnexal ttp

## 2021-06-07 NOTE — ED ADULT TRIAGE NOTE - CHIEF COMPLAINT QUOTE
C/o RLQ pelvic pain for 2 days. States she gave birth on 3/5, began menstruating 2 days ago, first time since giving birth. States it feels "different" than her normal period; heavier than usual. Used 4 pads today. PMHx: gestational DM. Finger stick 130

## 2021-06-07 NOTE — ED PROVIDER NOTE - PROGRESS NOTE DETAILS
Ac Vaughan DO PGY3 - US tech called to expedite US 2/2 appearing uncomfortable Ac Vaughan DO PGY3 - cleared by OB, ct wnl. W/u unremarkable. Pt feels better and ambulatory and agreeable to plan of dc home and fu and return precautions. All questions answered

## 2021-06-07 NOTE — ED PROVIDER NOTE - NSFOLLOWUPINSTRUCTIONS_ED_ALL_ED_FT
No signs of emergency medical condition on today's workup.  Presumptive diagnosis made, but further evaluation may be required by your primary care doctor or specialist for a definitive diagnosis.  Therefore, follow up as directed and if symptoms change/worsen or any emergency conditions, please return to the ER.     Follow up with your primary doctor and OBGYN this week for reevaluation    Take tylenol as directed as needed for continued pain    Please discard your breast milk for the next 24 hours as you were given morphine in the ED

## 2021-06-07 NOTE — ED ADULT NURSE NOTE - NSIMPLEMENTINTERV_GEN_ALL_ED
Implemented All Fall Risk Interventions:  [place] to call system. Call bell, personal items and telephone within reach. Instruct patient to call for assistance. Room bathroom lighting operational. Non-slip footwear when patient is off stretcher. Physically safe environment: no spills, clutter or unnecessary equipment. Stretcher in lowest position, wheels locked, appropriate side rails in place. Provide visual cue, wrist band, yellow gown, etc. Monitor gait and stability. Monitor for mental status changes and reorient to person, place, and time. Review medications for side effects contributing to fall risk. Reinforce activity limits and safety measures with patient and family.

## 2021-06-07 NOTE — ED ADULT NURSE NOTE - OBJECTIVE STATEMENT
received pt to room 21 aox3 in no apparent distress VSS c/o pelvic pain and vaginal bleeding. Pt states this is first menstrual cycle since giving birth in march but feels she is bleeding heavier than normal, changes pad x5 today and cramping is worse than normal. Pt endorses n/v, back pain and weakness. Denies urinary symptoms, fevers, SOB. Breaths equal and unlabored 18 G PIV R AC placed labs sent medicated as per order brought to US

## 2021-06-07 NOTE — ED PROVIDER NOTE - PATIENT PORTAL LINK FT
You can access the FollowMyHealth Patient Portal offered by [facility] by registering at the following website: [email or web address]. By joining [facility]’s FollowMyHealth portal, you will also be able to view your health information using other applications (apps) compatible with our system. Consent 3/Introductory Paragraph: I gave the patient a chance to ask questions they had about the procedure.  Following this I explained the Mohs procedure and consent was obtained. The risks, benefits and alternatives to therapy were discussed in detail. Specifically, the risks of infection, scarring, bleeding, prolonged wound healing, incomplete removal, allergy to anesthesia, nerve injury and recurrence were addressed. Prior to the procedure, the treatment site was clearly identified and confirmed by the patient. All components of Universal Protocol/PAUSE Rule completed.

## 2021-06-07 NOTE — ED PROVIDER NOTE - NS ED ROS FT
CONSTITUTIONAL: No fevers, no chills, no lightheadedness, no dizziness  EYES: no visual changes, no eye pain  EARS: no ear drainage, no ear pain, no change in hearing  NOSE: no nasal congestion  MOUTH/THROAT: no sore throat  CV: No chest pain, no palpitations  RESP: No SOB, no cough  GI: see hpi  : no dysuria, no hematuria, no flank pain  MSK: no back pain, no extremity pain  SKIN: no rashes  NEURO: no headache, no focal weakness, no decreased sensation/paresthesias

## 2021-06-08 VITALS
OXYGEN SATURATION: 100 % | DIASTOLIC BLOOD PRESSURE: 89 MMHG | SYSTOLIC BLOOD PRESSURE: 144 MMHG | RESPIRATION RATE: 16 BRPM | HEART RATE: 53 BPM | TEMPERATURE: 98 F

## 2021-06-08 PROCEDURE — 74177 CT ABD & PELVIS W/CONTRAST: CPT | Mod: 26

## 2021-06-08 NOTE — CONSULT NOTE ADULT - ASSESSMENT
34yo F  s/p  on 3/5 presents with abdominal pain x3days associated with onset of first menses since delivery.  RLQ pain worsened yesterday with 2x episode of vomiting.  TVUS wnl with no ovarian cysts and normal blood flow to ovaries.  Abdominal exam with minimal RLQ/suprapubic tenderness to deep palpation, no peritoneal signs, and non specific discomfort with bimanual exam.  Labs and vital signs wnl.No concern for ovarian torsion at this time.  Pain may be related to fist menses since delivery, or other etiology being worked up by ED.      Recs:  -f/u CT  -RLQ pain evaluation by ED  -tylenol/motrin for pain control  -no acute GYN intervention at this time    BHAVESH w/ Dr. Tracy Hay, PGY2 36yo F  s/p  on 3/5 presents with abdominal pain x3days associated with onset of first menses since delivery.  RLQ pain worsened yesterday with 2x episode of vomiting.  TVUS wnl with no ovarian cysts and normal blood flow to ovaries.  Abdominal exam with minimal RLQ/suprapubic tenderness to deep palpation, no peritoneal signs, and non specific discomfort with bimanual exam.  Labs and vital signs wnl. No concern for ovarian torsion at this time.  Pain may be related to fist menses since delivery, notably heavier than prior periods, or other etiology being worked up by ED.      Recs:  -f/u CT  -RLQ pain evaluation by ED  -tylenol/motrin for pain control  -no acute GYN intervention at this time    BHAVESH w/ Dr. Tracy Hay, PGY2 36yo F  s/p  on 3/5 presents with abdominal pain x3days associated with onset of first menses since delivery.  RLQ pain worsened yesterday with 2x episode of vomiting.  TVUS wnl with no ovarian cysts and normal blood flow to ovaries.  Abdominal exam with minimal RLQ/suprapubic tenderness to deep palpation, no peritoneal signs, and non specific discomfort with bimanual exam.  Labs and vital signs wnl. No concern for ovarian torsion at this time.  Pain may be related to fist menses since delivery, notably heavier than prior periods, or alternative etiology currently being worked up by ED.      Recs:  -f/u CT  -RLQ pain evaluation by ED  -tylenol/motrin for pain control  -no acute GYN intervention at this time  -please reconsult if any further questions    d/w Dr. Tracy Hay, PGY2

## 2021-06-08 NOTE — CONSULT NOTE ADULT - SUBJECTIVE AND OBJECTIVE BOX
PATIENT SEEN AT BEDSIDE, NOTE TO FOLLOW GYN Consult Note    36 y/o  s/p  3/5 presents with abdominal pain x3days.  Patient reports first menses since delivery starting at this same time.  Pain initially felt like contractions but worsened in severity over the past 3 days.  She typically gets pain with menses and reports a low pain tolerance at baseline.  She had 1x episode of emesis today after eating at 3pm.  She reports that menses is heavier than any prior period.  Denies fevers, chills, SOB, chest pain, specific abdominal pain, vaginal discharge/bleeding.  She has been breast feeding without issue.  Didn't take any pain meds at home prior to arrival to ED.    Name of GYN: Dr. Prieto      OBHx:  G1  eTOP (DVC)  G2 2008 FT  7#8  G3  eTOP (pills)  G4:    GynHx: denies h/o abnormal paps, STI's, fibroids, cysts  PMHx: denies  PSHx:  DVC  Med: denies  All: shrimp (swelling/rash)  SH: denies alcohol, tobacco, or drug use  Psych: denies h/o anxiety or depression      REVIEW OF SYSTEMS  General: denies fevers, chills, tiredness  Skin/Breast: denies breast pain  Respiratory and Thorax: denies shortness of breath, denies cough  Cardiovascular: denies chest pain and denies palpitations  Gastrointestinal: denies abdominal pain, nausea/ vomiting	  Genitourinary: denies dysuria, increased urinary frequency, urgency	  Constitutional, Cardiovascular, Respiratory, Gastrointestinal, Genitourinary, Musculoskeletal and Integumentary review of systems are normal except as noted. 	      Vital Signs Last 24 Hrs  T(C): 36.7 (2021 21:45), Max: 36.7 (2021 20:23)  T(F): 98 (2021 21:45), Max: 98.1 (2021 20:23)  HR: 70 (2021 22:01) (60 - 70)  BP: 151/76 (2021 22:01) (151/76 - 169/89)  BP(mean): --  RR: 18 (2021 22:01) (18 - 18)  SpO2: 100% (2021 22:01) (100% - 100%)    PHYSICAL EXAM:   Gen: NAD, alert and oriented x 3  Cardiovascular: regular   Respiratory: breathing comfortably on RA  Abd: soft, nontender, non-distended  Pelvic: closed/long, no CMT, bleeding consistent w/ heavy menses   Uterus: normal size, non tender  Adnexa: non specific tenderness w/ bimanual exam, no palpable masses  Extremities: NTBL  Skin: warm and well perfused      LABS:                        12.9   9.09  )-----------( 270      ( 2021 22:23 )             39.6     06-07    139  |  104  |  14  ----------------------------<  127<H>  3.7   |  22  |  0.67    Ca    9.0      2021 22:23    TPro  7.3  /  Alb  4.1  /  TBili  0.4  /  DBili  x   /  AST  17  /  ALT  20  /  AlkPhos  83  06-07      HCG Quantitative, Serum (21 @ 22:23)   HCG Quantitative, Serum: <5.0    RADIOLOGY & ADDITIONAL STUDIES:  < from: US Transvaginal (21 @ 22:26) >  EXAM:  US TRANSVAGINAL        PROCEDURE DATE:  2021         INTERPRETATION:  CLINICAL INFORMATION: Right adnexal pain.    LMP: 2021    COMPARISON: None available.    TECHNIQUE:  Endovaginal pelvic sonogram only. Color and Spectral Doppler was performed.    FINDINGS:    Uterus: 9.1 cm x 4.6 cm x 5.9 cm. Within normal limits. Nabothian cysts.  Endometrium: 5 mm. Within normal limits.    Right ovary: 2.2 cm x 1.9 cm x 2.1 cm. Within normal limits. Ovarian blood flow demonstrated utilizing color and spectral Doppler.  Left ovary: 2.2 cm x 1.7 cm x 1.7 cm. Within normal limits. Ovarian blood flow demonstrated utilizing color and spectral Doppler.    Fluid: None.    IMPRESSION:  Normal pelvic sonogram.          < end of copied text >   GYN Consult Note    36 y/o  s/p  3/5 presents with abdominal pain x3days.  Patient reports first menses since delivery starting at this same time.  Pain initially felt like contractions but worsened in severity over the past 3 days.  She typically gets pain with menses and reports a low pain tolerance at baseline.  She had 1x episode of emesis today after eating at 3pm.  She reports that menses is heavier than any prior period.  Denies fevers, chills, SOB, chest pain, specific abdominal pain, vaginal discharge/bleeding.  She has been breast feeding without issue.  Didn't take any pain meds at home prior to arrival to ED.    Name of GYN: Dr. Molina      OBHx:  G1  eTOP (DVC)  G2 2008 FT  7#8  G3  eTOP (pills)  G4:    GynHx: denies h/o abnormal paps, STI's, fibroids, cysts  PMHx: denies  PSHx:  DVC  Med: denies  All: shrimp (swelling/rash)  SH: denies alcohol, tobacco, or drug use  Psych: denies h/o anxiety or depression      REVIEW OF SYSTEMS  General: denies fevers, chills, tiredness  Skin/Breast: denies breast pain  Respiratory and Thorax: denies shortness of breath, denies cough  Cardiovascular: denies chest pain and denies palpitations  Gastrointestinal: denies abdominal pain, nausea/ vomiting	  Genitourinary: denies dysuria, increased urinary frequency, urgency	  Constitutional, Cardiovascular, Respiratory, Gastrointestinal, Genitourinary, Musculoskeletal and Integumentary review of systems are normal except as noted. 	      Vital Signs Last 24 Hrs  T(C): 36.7 (2021 21:45), Max: 36.7 (2021 20:23)  T(F): 98 (2021 21:45), Max: 98.1 (2021 20:23)  HR: 70 (2021 22:01) (60 - 70)  BP: 151/76 (2021 22:01) (151/76 - 169/89)  BP(mean): --  RR: 18 (2021 22:01) (18 - 18)  SpO2: 100% (2021 22:01) (100% - 100%)    PHYSICAL EXAM:   Gen: NAD, alert and oriented x 3  Cardiovascular: regular   Respiratory: breathing comfortably on RA  Abd: soft, nontender, non-distended  Pelvic: closed/long, no CMT, bleeding consistent w/ heavy menses   Uterus: normal size, non tender  Adnexa: non specific tenderness w/ bimanual exam, no palpable masses  Extremities: NTBL  Skin: warm and well perfused      LABS:                        12.9   9.09  )-----------( 270      ( 2021 22:23 )             39.6     06-07    139  |  104  |  14  ----------------------------<  127<H>  3.7   |  22  |  0.67    Ca    9.0      2021 22:23    TPro  7.3  /  Alb  4.1  /  TBili  0.4  /  DBili  x   /  AST  17  /  ALT  20  /  AlkPhos  83  06-07      HCG Quantitative, Serum (21 @ 22:23)   HCG Quantitative, Serum: <5.0    RADIOLOGY & ADDITIONAL STUDIES:  < from: US Transvaginal (21 @ 22:26) >  EXAM:  US TRANSVAGINAL        PROCEDURE DATE:  2021         INTERPRETATION:  CLINICAL INFORMATION: Right adnexal pain.    LMP: 2021    COMPARISON: None available.    TECHNIQUE:  Endovaginal pelvic sonogram only. Color and Spectral Doppler was performed.    FINDINGS:    Uterus: 9.1 cm x 4.6 cm x 5.9 cm. Within normal limits. Nabothian cysts.  Endometrium: 5 mm. Within normal limits.    Right ovary: 2.2 cm x 1.9 cm x 2.1 cm. Within normal limits. Ovarian blood flow demonstrated utilizing color and spectral Doppler.  Left ovary: 2.2 cm x 1.7 cm x 1.7 cm. Within normal limits. Ovarian blood flow demonstrated utilizing color and spectral Doppler.    Fluid: None.    IMPRESSION:  Normal pelvic sonogram.          < end of copied text >

## 2021-06-18 ENCOUNTER — RX RENEWAL (OUTPATIENT)
Age: 36
End: 2021-06-18

## 2021-06-21 ENCOUNTER — RX RENEWAL (OUTPATIENT)
Age: 36
End: 2021-06-21

## 2021-06-22 ENCOUNTER — APPOINTMENT (OUTPATIENT)
Dept: INTERNAL MEDICINE | Facility: CLINIC | Age: 36
End: 2021-06-22
Payer: MEDICAID

## 2021-06-22 VITALS
OXYGEN SATURATION: 97 % | DIASTOLIC BLOOD PRESSURE: 81 MMHG | WEIGHT: 180 LBS | HEART RATE: 97 BPM | SYSTOLIC BLOOD PRESSURE: 113 MMHG | TEMPERATURE: 99 F | HEIGHT: 64 IN | BODY MASS INDEX: 30.73 KG/M2

## 2021-06-22 PROCEDURE — 99213 OFFICE O/P EST LOW 20 MIN: CPT | Mod: 25

## 2021-06-22 NOTE — HISTORY OF PRESENT ILLNESS
[FreeTextEntry1] : f/u diabetes [de-identified] : using short acting insulin 2-3x day for coverage--10 units--only doing sliding scale in am --sliding scale starts at 120\par am glus 110-120, pppbs 130-140\par using basalgar 10 units qhs\par reviewed labs\par locking in left ankle\par tingling left hand with sleep

## 2021-06-22 NOTE — ASSESSMENT
[FreeTextEntry1] : advised to follow sliding scale given for am and pm meals\par to contact provider in 2 wks\par goal would be to eliminate short acting insulin--pt not sure if she going to concieve in future so would remain on basalgar insulin for now\par radhika?\par wrist splint

## 2021-07-07 ENCOUNTER — APPOINTMENT (OUTPATIENT)
Dept: INTERNAL MEDICINE | Facility: CLINIC | Age: 36
End: 2021-07-07

## 2021-08-09 RX ORDER — FLASH GLUCOSE SENSOR
KIT MISCELLANEOUS
Qty: 6 | Refills: 1 | Status: ACTIVE | COMMUNITY
Start: 2021-06-22 | End: 1900-01-01

## 2021-08-09 RX ORDER — FLASH GLUCOSE SCANNING READER
EACH MISCELLANEOUS
Qty: 1 | Refills: 0 | Status: ACTIVE | COMMUNITY
Start: 2021-06-22 | End: 1900-01-01

## 2021-08-10 ENCOUNTER — APPOINTMENT (OUTPATIENT)
Dept: CARDIOLOGY | Facility: CLINIC | Age: 36
End: 2021-08-10

## 2021-08-20 ENCOUNTER — APPOINTMENT (OUTPATIENT)
Dept: INTERNAL MEDICINE | Facility: CLINIC | Age: 36
End: 2021-08-20

## 2021-09-16 ENCOUNTER — APPOINTMENT (OUTPATIENT)
Dept: INTERNAL MEDICINE | Facility: CLINIC | Age: 36
End: 2021-09-16

## 2021-09-20 ENCOUNTER — APPOINTMENT (OUTPATIENT)
Dept: CARDIOLOGY | Facility: CLINIC | Age: 36
End: 2021-09-20

## 2021-10-20 ENCOUNTER — RX RENEWAL (OUTPATIENT)
Age: 36
End: 2021-10-20

## 2021-10-21 RX ORDER — PEN NEEDLE, DIABETIC 29 G X1/2"
32G X 4 MM NEEDLE, DISPOSABLE MISCELLANEOUS
Qty: 100 | Refills: 2 | Status: ACTIVE | COMMUNITY
Start: 2021-06-18 | End: 1900-01-01

## 2021-10-25 ENCOUNTER — RX RENEWAL (OUTPATIENT)
Age: 36
End: 2021-10-25

## 2021-10-27 ENCOUNTER — APPOINTMENT (OUTPATIENT)
Dept: INTERNAL MEDICINE | Facility: CLINIC | Age: 36
End: 2021-10-27

## 2021-11-22 ENCOUNTER — NON-APPOINTMENT (OUTPATIENT)
Age: 36
End: 2021-11-22

## 2021-11-24 ENCOUNTER — APPOINTMENT (OUTPATIENT)
Dept: INTERNAL MEDICINE | Facility: CLINIC | Age: 36
End: 2021-11-24

## 2021-12-03 ENCOUNTER — RX RENEWAL (OUTPATIENT)
Age: 36
End: 2021-12-03

## 2022-01-27 ENCOUNTER — APPOINTMENT (OUTPATIENT)
Dept: INTERNAL MEDICINE | Facility: CLINIC | Age: 37
End: 2022-01-27

## 2022-02-22 ENCOUNTER — RX RENEWAL (OUTPATIENT)
Age: 37
End: 2022-02-22

## 2022-03-02 ENCOUNTER — APPOINTMENT (OUTPATIENT)
Dept: OBGYN | Facility: CLINIC | Age: 37
End: 2022-03-02
Payer: MEDICAID

## 2022-03-02 VITALS
BODY MASS INDEX: 30.22 KG/M2 | WEIGHT: 177 LBS | HEIGHT: 64 IN | SYSTOLIC BLOOD PRESSURE: 118 MMHG | DIASTOLIC BLOOD PRESSURE: 66 MMHG

## 2022-03-02 LAB
BILIRUB UR QL STRIP: NORMAL
GLUCOSE UR-MCNC: NORMAL
HCG UR QL: 0.2 EU/DL
HCG UR QL: POSITIVE
HGB UR QL STRIP.AUTO: NORMAL
KETONES UR-MCNC: NORMAL
LEUKOCYTE ESTERASE UR QL STRIP: NORMAL
NITRITE UR QL STRIP: NORMAL
PH UR STRIP: 7
PROT UR STRIP-MCNC: NORMAL
QUALITY CONTROL: YES
SP GR UR STRIP: 1.02

## 2022-03-02 PROCEDURE — 76817 TRANSVAGINAL US OBSTETRIC: CPT

## 2022-03-02 PROCEDURE — 81025 URINE PREGNANCY TEST: CPT

## 2022-03-02 PROCEDURE — 81003 URINALYSIS AUTO W/O SCOPE: CPT | Mod: QW

## 2022-03-02 PROCEDURE — 99213 OFFICE O/P EST LOW 20 MIN: CPT | Mod: 25

## 2022-03-02 NOTE — HISTORY OF PRESENT ILLNESS
[FreeTextEntry1] : 37yo  LMP 21  here with  missed menses and positive urine pregnancy test.\par DM II on insulin; never stopped being insulin dependent s/p last delivery\par \par TOP x 2 (medical x 1, D&C x 1)\par  x 2, GDMA1 in first pregnancy, GDMA2 in second

## 2022-03-02 NOTE — DISCUSSION/SUMMARY
[FreeTextEntry1] : - Pap obtained two weeks ago; will obtain records release\par - Bedside sono showed SLIUP, TIMMY c/w LMP\par - Oriented to practice/goals of care\par - return for NIPS/prenatals\par - MFM referral rendered as pt as pre-existing diabetic on insulin\par

## 2022-03-02 NOTE — HISTORY OF PRESENT ILLNESS
[FreeTextEntry1] : 35yo  LMP 21  here with  missed menses and positive urine pregnancy test.\par DM II on insulin; never stopped being insulin dependent s/p last delivery\par \par TOP x 2 (medical x 1, D&C x 1)\par  x 2, GDMA1 in first pregnancy, GDMA2 in second

## 2022-03-03 LAB
C TRACH RRNA SPEC QL NAA+PROBE: NOT DETECTED
N GONORRHOEA RRNA SPEC QL NAA+PROBE: NOT DETECTED
SOURCE AMPLIFICATION: NORMAL

## 2022-03-04 ENCOUNTER — APPOINTMENT (OUTPATIENT)
Dept: MATERNAL FETAL MEDICINE | Facility: CLINIC | Age: 37
End: 2022-03-04
Payer: MEDICAID

## 2022-03-04 ENCOUNTER — ASOB RESULT (OUTPATIENT)
Age: 37
End: 2022-03-04

## 2022-03-04 VITALS — BODY MASS INDEX: 30.56 KG/M2 | WEIGHT: 179 LBS | HEIGHT: 64 IN

## 2022-03-04 DIAGNOSIS — O24.111 PRE-EXISTING TYPE 2 DIABETES MELLITUS, IN PREGNANCY, FIRST TRIMESTER: ICD-10-CM

## 2022-03-04 DIAGNOSIS — Z86.39 PERSONAL HISTORY OF OTHER ENDOCRINE, NUTRITIONAL AND METABOLIC DISEASE: ICD-10-CM

## 2022-03-04 LAB
SOURCE AMPLIFICATION: NORMAL
T VAGINALIS RRNA SPEC QL NAA+PROBE: NOT DETECTED

## 2022-03-04 PROCEDURE — G0108 DIAB MANAGE TRN  PER INDIV: CPT | Mod: 95

## 2022-03-08 LAB — BACTERIA UR CULT: NORMAL

## 2022-03-10 ENCOUNTER — ASOB RESULT (OUTPATIENT)
Age: 37
End: 2022-03-10

## 2022-03-10 ENCOUNTER — APPOINTMENT (OUTPATIENT)
Dept: ANTEPARTUM | Facility: CLINIC | Age: 37
End: 2022-03-10
Payer: MEDICAID

## 2022-03-10 ENCOUNTER — APPOINTMENT (OUTPATIENT)
Dept: MATERNAL FETAL MEDICINE | Facility: CLINIC | Age: 37
End: 2022-03-10

## 2022-03-10 ENCOUNTER — NON-APPOINTMENT (OUTPATIENT)
Age: 37
End: 2022-03-10

## 2022-03-10 ENCOUNTER — APPOINTMENT (OUTPATIENT)
Dept: ANTEPARTUM | Facility: CLINIC | Age: 37
End: 2022-03-10

## 2022-03-10 ENCOUNTER — APPOINTMENT (OUTPATIENT)
Dept: MATERNAL FETAL MEDICINE | Facility: CLINIC | Age: 37
End: 2022-03-10
Payer: MEDICAID

## 2022-03-10 VITALS
BODY MASS INDEX: 30.73 KG/M2 | WEIGHT: 180 LBS | OXYGEN SATURATION: 98 % | SYSTOLIC BLOOD PRESSURE: 118 MMHG | RESPIRATION RATE: 18 BRPM | DIASTOLIC BLOOD PRESSURE: 78 MMHG | HEART RATE: 86 BPM | HEIGHT: 64 IN

## 2022-03-10 DIAGNOSIS — M25.579 PAIN IN UNSPECIFIED ANKLE AND JOINTS OF UNSPECIFIED FOOT: ICD-10-CM

## 2022-03-10 DIAGNOSIS — O24.414 GESTATIONAL DIABETES MELLITUS IN PREGNANCY, INSULIN CONTROLLED: ICD-10-CM

## 2022-03-10 DIAGNOSIS — O99.211 OBESITY COMPLICATING PREGNANCY, FIRST TRIMESTER: ICD-10-CM

## 2022-03-10 DIAGNOSIS — Z3A.38 38 WEEKS GESTATION OF PREGNANCY: ICD-10-CM

## 2022-03-10 DIAGNOSIS — Z01.419 ENCOUNTER FOR GYNECOLOGICAL EXAMINATION (GENERAL) (ROUTINE) W/OUT ABNORMAL FINDINGS: ICD-10-CM

## 2022-03-10 DIAGNOSIS — R73.03 PREDIABETES.: ICD-10-CM

## 2022-03-10 DIAGNOSIS — G56.02 CARPAL TUNNEL SYNDROME, LEFT UPPER LIMB: ICD-10-CM

## 2022-03-10 DIAGNOSIS — Z3A.36 36 WEEKS GESTATION OF PREGNANCY: ICD-10-CM

## 2022-03-10 DIAGNOSIS — O09.523 SUPERVISION OF ELDERLY MULTIGRAVIDA, THIRD TRIMESTER: ICD-10-CM

## 2022-03-10 DIAGNOSIS — Z01.818 ENCOUNTER FOR OTHER PREPROCEDURAL EXAMINATION: ICD-10-CM

## 2022-03-10 DIAGNOSIS — J45.909 UNSPECIFIED ASTHMA, UNCOMPLICATED: ICD-10-CM

## 2022-03-10 DIAGNOSIS — Z23 ENCOUNTER FOR IMMUNIZATION: ICD-10-CM

## 2022-03-10 DIAGNOSIS — Z32.01 ENCOUNTER FOR PREGNANCY TEST, RESULT POSITIVE: ICD-10-CM

## 2022-03-10 DIAGNOSIS — Z92.29 PERSONAL HISTORY OF OTHER DRUG THERAPY: ICD-10-CM

## 2022-03-10 DIAGNOSIS — N92.6 IRREGULAR MENSTRUATION, UNSPECIFIED: ICD-10-CM

## 2022-03-10 DIAGNOSIS — Z3A.37 37 WEEKS GESTATION OF PREGNANCY: ICD-10-CM

## 2022-03-10 DIAGNOSIS — Z87.898 PERSONAL HISTORY OF OTHER SPECIFIED CONDITIONS: ICD-10-CM

## 2022-03-10 DIAGNOSIS — R92.2 INCONCLUSIVE MAMMOGRAM: ICD-10-CM

## 2022-03-10 DIAGNOSIS — R09.89 OTHER SPECIFIED SYMPTOMS AND SIGNS INVOLVING THE CIRCULATORY AND RESPIRATORY SYSTEMS: ICD-10-CM

## 2022-03-10 DIAGNOSIS — Z3A.34 34 WEEKS GESTATION OF PREGNANCY: ICD-10-CM

## 2022-03-10 PROCEDURE — 36415 COLL VENOUS BLD VENIPUNCTURE: CPT

## 2022-03-10 PROCEDURE — 76801 OB US < 14 WKS SINGLE FETUS: CPT

## 2022-03-10 PROCEDURE — 99205 OFFICE O/P NEW HI 60 MIN: CPT

## 2022-03-10 RX ORDER — ASPIRIN ENTERIC COATED TABLETS 81 MG 81 MG/1
81 TABLET, DELAYED RELEASE ORAL
Qty: 30 | Refills: 4 | Status: ACTIVE | COMMUNITY
Start: 2022-03-10 | End: 1900-01-01

## 2022-03-10 NOTE — OB HISTORY
[Pregnancy History] : girl [___] : Delivery occurred at [unfilled] [LMP: ___] : LMP: [unfilled] [TIMMY: ___] : TIMMY: [unfilled] [EGA: ___ wks] : EGA: [unfilled] wks [Spontaneous] : Spontaneous conception [Sonogram] : sonogram [at ___ wks] : at [unfilled] weeks [Definite:  ___ (Date)] : the last menstrual period was [unfilled] [Normal Amount/Duration] : was of a normal amount and duration [Regular Cycle Intervals] : periods have been regular [FreeTextEntry1] : Prenatal record is not available for review.\par \par She had a telehealth visit with the diabetes educator on March 4, 2022 for initial diabetes education and counseling.\par \par  [Spotting Between  Menses] : no spotting between menses

## 2022-03-10 NOTE — VITALS
[LMP (date): ___] : LMP was on [unfilled] [GA =___ Weeks] : which calculates to a GA of [unfilled] weeks [GA= ___ Days] : and [unfilled] day(s) [TIMMY by LMP (date): ___] : The calculated TIMMY by LMP is [unfilled] [By LMP] : this is the final TIMMY

## 2022-03-10 NOTE — DISCUSSION/SUMMARY
[FreeTextEntry1] : She is 10 weeks and 3 days gestation by her last menstrual period dates.\par \par She is overweight and obesity has been associated with a number of maternal complications such as pre-eclampsia, thrombophlebitis, labor abnormalities, post-term pregnancies,  delivery, and operative complications. Obesity has been associated with adverse fetal outcomes such as late stillbirth and  deliveries.  Obese women also have a two to three-fold increased incidence in congenital anomalies. \par \par She had gestational diabetes with her last pregnancy and was treated with insulin. She was diagnosed as having prediabetes based on a hemoglobin A1c level of 6.4% on 2021.  She currently takes Basiglar 20 units at bedtime.  She also takes Admelog 16 units before breakfast, lunch, and dinner.  She has early onset gestational diabetes by history. She had a telehealth visit with our diabetes educator on 2022 and is scheduled to have a follow-up visit on 2022.  \par \par She told me that she is following the recommended diabetic diet.  She is currently eating 3 meals without snacks. She performs fasting and 1 hour postprandial self glucose monitoring.  She did not bring her glucose log book for my review.  At this time I am not able to assess her glycemic control. I informed her that maintaining euglycemia is the most important factor associated with good  outcomes in pregnancies complicated by gestational diabetes. I told her that poor glucose control may cause fetal macrosomia, shoulder dystocia,  delivery, stillbirth,  respiratory distress syndrome and  metabolic complications such as hypoglycemia and hyperbilirubinemia. I told her that she is at risk for developing gestational hypertension or preeclampsia during the current pregnancy. I also told her that she is at risk for developing type 2 diabetes, metabolic syndrome and cardiovascular disease later in life.  I also recommend a 75 gram 2 hour OGTT approximately 6 - 8 weeks postpartum to determine whether she has preexisting diabetes not diagnosed prior to the pregnancy. I gave her dietary counseling. I told her to eat three daily meals with 3 snacks to reduce postprandial glucose fluctuations. She has not been keeping a daily food diary. I gave her a food /glucose diary to write her daily food intake. She was advised to bring the food / glucose diary to her prenatal visits.\par \par She has been diagnosed with bronchial asthma since age 89.  She uses an albuterol inhaler and nebulizer as needed.  Her last wheezing episode was during 2022.  Regarding her bronchial asthma, it appears to be mild.  She was informed that pregnancy does not increase the frequency or severity of asthma.  She was advised to continue taking her asthma medication as needed since the most common reason for severe asthma attacks during pregnancy is the failure of patients to take their asthma medication because of the belief that asthma medication is harmful to the fetus.  She was encouraged to avoid precipitating factors. I told her that asthma attacks almost never occur during labor. If anesthesia is required during labor and delivery, epidural anesthesia is preferable to general anesthesia because of the risk of chest infection and atelectasis.  I suggest not using Hemabate in the event of uterine atony because it can cause bronchospasms in patients with asthma. Also consider not using Labetalol to treat hypertension in the event of hypertension during pregnancy since it can cause bronchospasm in individuals with asthma. I told her that poorly controlled asthma can be associated with  delivery, low birth weight, pre-eclampsia, and  mortality.  \par \par She has risk factors for having preeclampsia during pregnancy. I told her that taking a baby aspirin during pregnancy has been found to reduce the risk of developing preeclampsia,  delivery, and fetal growth restriction. She was advised to take one baby aspirin daily at 12 weeks gestation. I also told her that she can alternate taking a baby aspirin one day and two baby aspirins the next day. I also told her to stop taking the baby aspirin once the pregnancy reaches 37 weeks gestation.\par \par Regarding her advanced maternal age, I informed her of the association between advanced maternal age and fetal chromosomal disorders such as Down syndrome and structural birth defects. She was told that all pregnancies have a 2 to 3% risk of having a baby born with a birth defect, and a 1 to 2 % risk of having a baby born with developmental problems that cannot be diagnosed during pregnancy. I told her that there are two types of birth defects, structural and chromosomal. She was made aware that prenatal diagnosis is available to determine whether the fetus she is carrying has normal or abnormal chromosomes,and normal or abnormal anatomy. I discussed the various screening and diagnostic tests used for prenatal diagnosis.  I explained the difference between screening and diagnostic tests.  All of her questions were answered. She was offered a referral for genetic counseling. She agree to have genetic counseling and she was giving a referral to see the genetic counselor as soon as possible. She also consented to have a first trimester screening test and was scheduled to have the test in 2 weeks.  She is also interested in having a noninvasive prenatal screen test.\par \par I told her that advanced maternal age has been associated with a higher incidence of preeclampsia /eclampsia.  I also told her that advanced maternal age has been associated with an increased risk of stillbirth, and therefore, I recommend delivery during the 39 week of gestation in the event she has not given birth by 39 weeks of gestation. \par

## 2022-03-10 NOTE — FAMILY HISTORY
[Reported Family History Of Birth Defects] : no congenital heart defects [Elgin-Sachs Carrier] : no Elgin-Sachs [Family History] : no mental retardation/autism [Reported Family History Of Genetic Disease] : no history of child defect in child of baby father

## 2022-03-10 NOTE — CHIEF COMPLAINT
[G ___] : G [unfilled] [P ___] : P [unfilled] [de-identified] : early onset gestational diabetes requiring insulin

## 2022-03-11 LAB
ESTIMATED AVERAGE GLUCOSE: 128 MG/DL
HBA1C MFR BLD HPLC: 6.1 %

## 2022-03-14 ENCOUNTER — ASOB RESULT (OUTPATIENT)
Age: 37
End: 2022-03-14

## 2022-03-14 ENCOUNTER — APPOINTMENT (OUTPATIENT)
Dept: MATERNAL FETAL MEDICINE | Facility: CLINIC | Age: 37
End: 2022-03-14
Payer: MEDICAID

## 2022-03-14 PROCEDURE — 99212 OFFICE O/P EST SF 10 MIN: CPT | Mod: TH,95

## 2022-03-18 ENCOUNTER — APPOINTMENT (OUTPATIENT)
Dept: MATERNAL FETAL MEDICINE | Facility: CLINIC | Age: 37
End: 2022-03-18
Payer: MEDICAID

## 2022-03-18 ENCOUNTER — ASOB RESULT (OUTPATIENT)
Age: 37
End: 2022-03-18

## 2022-03-18 PROCEDURE — G0108 DIAB MANAGE TRN  PER INDIV: CPT | Mod: 95

## 2022-03-18 RX ORDER — INSULIN DETEMIR 100 [IU]/ML
100 INJECTION, SOLUTION SUBCUTANEOUS
Qty: 100 | Refills: 4 | Status: DISCONTINUED | COMMUNITY
Start: 2020-12-28 | End: 2022-03-18

## 2022-03-18 RX ORDER — LANCETS
EACH MISCELLANEOUS
Qty: 100 | Refills: 3 | Status: DISCONTINUED | COMMUNITY
Start: 2021-06-21 | End: 2022-03-18

## 2022-03-18 RX ORDER — SYRINGE-NEEDLE,INSULIN,0.5 ML 31 GX5/16"
31G X 5/16" SYRINGE, EMPTY DISPOSABLE MISCELLANEOUS
Qty: 2 | Refills: 2 | Status: DISCONTINUED | COMMUNITY
Start: 2021-01-13 | End: 2022-03-18

## 2022-03-18 RX ORDER — INSULIN DETEMIR 100 [IU]/ML
100 INJECTION, SOLUTION SUBCUTANEOUS
Qty: 2 | Refills: 1 | Status: DISCONTINUED | COMMUNITY
Start: 2021-01-13 | End: 2022-03-18

## 2022-03-18 RX ORDER — URINE ACETONE TEST STRIPS
STRIP MISCELLANEOUS
Qty: 1 | Refills: 2 | Status: DISCONTINUED | COMMUNITY
Start: 2020-12-14 | End: 2022-03-18

## 2022-03-23 ENCOUNTER — APPOINTMENT (OUTPATIENT)
Dept: OBGYN | Facility: CLINIC | Age: 37
End: 2022-03-23
Payer: MEDICAID

## 2022-03-23 VITALS
DIASTOLIC BLOOD PRESSURE: 66 MMHG | SYSTOLIC BLOOD PRESSURE: 112 MMHG | HEIGHT: 64 IN | WEIGHT: 185 LBS | BODY MASS INDEX: 31.58 KG/M2

## 2022-03-23 PROCEDURE — 99213 OFFICE O/P EST LOW 20 MIN: CPT | Mod: 25

## 2022-03-23 PROCEDURE — 36415 COLL VENOUS BLD VENIPUNCTURE: CPT

## 2022-03-23 PROCEDURE — 76815 OB US LIMITED FETUS(S): CPT

## 2022-03-24 ENCOUNTER — APPOINTMENT (OUTPATIENT)
Dept: ANTEPARTUM | Facility: CLINIC | Age: 37
End: 2022-03-24
Payer: MEDICAID

## 2022-03-24 ENCOUNTER — APPOINTMENT (OUTPATIENT)
Dept: MATERNAL FETAL MEDICINE | Facility: CLINIC | Age: 37
End: 2022-03-24
Payer: MEDICAID

## 2022-03-24 ENCOUNTER — ASOB RESULT (OUTPATIENT)
Age: 37
End: 2022-03-24

## 2022-03-24 DIAGNOSIS — Z3A.10 10 WEEKS GESTATION OF PREGNANCY: ICD-10-CM

## 2022-03-24 DIAGNOSIS — O09.521 SUPERVISION OF ELDERLY MULTIGRAVIDA, FIRST TRIMESTER: ICD-10-CM

## 2022-03-24 LAB
ABO + RH PNL BLD: NORMAL
BASOPHILS # BLD AUTO: 0.06 K/UL
BASOPHILS NFR BLD AUTO: 0.6 %
BILIRUB UR QL STRIP: NORMAL
BLD GP AB SCN SERPL QL: NORMAL
EOSINOPHIL # BLD AUTO: 0.22 K/UL
EOSINOPHIL NFR BLD AUTO: 2.3 %
GLUCOSE UR-MCNC: NORMAL
HBV SURFACE AG SERPL QL IA: NONREACTIVE
HCG UR QL: 0.2 EU/DL
HCT VFR BLD CALC: 39.9 %
HCV AB SER QL: NONREACTIVE
HCV S/CO RATIO: 0.11 S/CO
HGB A MFR BLD: 97.4 %
HGB A2 MFR BLD: 2.6 %
HGB BLD-MCNC: 13.1 G/DL
HGB FRACT BLD-IMP: NORMAL
HGB UR QL STRIP.AUTO: NORMAL
HIV1+2 AB SPEC QL IA.RAPID: NONREACTIVE
IMM GRANULOCYTES NFR BLD AUTO: 0.3 %
KETONES UR-MCNC: NORMAL
LEUKOCYTE ESTERASE UR QL STRIP: NORMAL
LYMPHOCYTES # BLD AUTO: 2.29 K/UL
LYMPHOCYTES NFR BLD AUTO: 23.7 %
MAN DIFF?: NORMAL
MCHC RBC-ENTMCNC: 29.1 PG
MCHC RBC-ENTMCNC: 32.8 GM/DL
MCV RBC AUTO: 88.7 FL
MONOCYTES # BLD AUTO: 0.5 K/UL
MONOCYTES NFR BLD AUTO: 5.2 %
NEUTROPHILS # BLD AUTO: 6.58 K/UL
NEUTROPHILS NFR BLD AUTO: 67.9 %
NITRITE UR QL STRIP: NORMAL
PH UR STRIP: 5.5
PLATELET # BLD AUTO: 293 K/UL
PROT UR STRIP-MCNC: NORMAL
RBC # BLD: 4.5 M/UL
RBC # FLD: 14.8 %
SP GR UR STRIP: 1.01
TSH SERPL-ACNC: 1.48 UIU/ML
WBC # FLD AUTO: 9.68 K/UL

## 2022-03-24 PROCEDURE — 99214 OFFICE O/P EST MOD 30 MIN: CPT

## 2022-03-24 PROCEDURE — 76813 OB US NUCHAL MEAS 1 GEST: CPT

## 2022-03-24 NOTE — HISTORY OF PRESENT ILLNESS
[FreeTextEntry1] : Melanie presents for her ultrascreen.  She was a previously diagnosed diabetic, who had discontinued her insulin. She was reevaluated earlier this month when she complined of dizziness and found to have elevated glucose and an elevated HgA1C.  She met with the dietary team and restarted insulin at that time.

## 2022-03-24 NOTE — DISCUSSION/SUMMARY
[FreeTextEntry1] : Level 2 sonogram at 20 weeks. \par \par Fetal echo to be scheduled due to pregestational diabetes\par \par Discontinue the Basaglar, and increase the nighttime Humulin dose to 20u\par \par Continue the Admelog doses of 16u with meals. \par \par Followup with medical nutrition is scheduled.

## 2022-03-24 NOTE — DATA REVIEWED
[FreeTextEntry1] : Ultrascreen performed today, with a normal NT noted. \par \par Review of home glucose shows improved control with the increasing dose of insulin. Her dosing is somewhat convoluted and complicated, and we will adjust to streamline the doses. \par \par She denies any issues of hypoglycemia. \par \par We reviewed the importance of getting contol as quickly as possible, and Melanie seems to be very motivated.

## 2022-03-25 LAB
LEAD BLD-MCNC: <1 UG/DL
MEV IGG FLD QL IA: 62.7 AU/ML
MEV IGG+IGM SER-IMP: POSITIVE
RUBV IGG FLD-ACNC: 11.9 INDEX
RUBV IGG SER-IMP: POSITIVE
T PALLIDUM AB SER QL IA: NEGATIVE
VZV AB TITR SER: POSITIVE
VZV IGG SER IF-ACNC: 219.5 INDEX

## 2022-03-28 LAB
B19V IGG SER QL IA: 0.38 INDEX
B19V IGG+IGM SER-IMP: NEGATIVE
B19V IGG+IGM SER-IMP: NORMAL
B19V IGM FLD-ACNC: 0.13 INDEX
B19V IGM SER-ACNC: NEGATIVE

## 2022-03-30 LAB
1ST TRIMESTER DATA: NORMAL
ADDENDUM DOC: NORMAL
AFP PNL SERPL: NORMAL
AFP SERPL-ACNC: NORMAL
CLINICAL BIOCHEMIST REVIEW: NORMAL
FREE BETA HCG 1ST TRIMESTER: NORMAL
Lab: NORMAL
NOTES NTD: NORMAL
NT: NORMAL
PAPP-A SERPL-ACNC: NORMAL
TRISOMY 18/3: NORMAL

## 2022-03-31 ENCOUNTER — NON-APPOINTMENT (OUTPATIENT)
Age: 37
End: 2022-03-31

## 2022-04-01 ENCOUNTER — ASOB RESULT (OUTPATIENT)
Age: 37
End: 2022-04-01

## 2022-04-01 ENCOUNTER — APPOINTMENT (OUTPATIENT)
Dept: MATERNAL FETAL MEDICINE | Facility: CLINIC | Age: 37
End: 2022-04-01
Payer: MEDICAID

## 2022-04-01 PROCEDURE — G0108 DIAB MANAGE TRN  PER INDIV: CPT | Mod: 95

## 2022-04-05 ENCOUNTER — RX RENEWAL (OUTPATIENT)
Age: 37
End: 2022-04-05

## 2022-04-15 ENCOUNTER — ASOB RESULT (OUTPATIENT)
Age: 37
End: 2022-04-15

## 2022-04-15 ENCOUNTER — APPOINTMENT (OUTPATIENT)
Dept: MATERNAL FETAL MEDICINE | Facility: CLINIC | Age: 37
End: 2022-04-15
Payer: MEDICAID

## 2022-04-15 VITALS — HEIGHT: 64 IN | WEIGHT: 18 LBS | BODY MASS INDEX: 3.07 KG/M2

## 2022-04-15 PROCEDURE — G0108 DIAB MANAGE TRN  PER INDIV: CPT | Mod: 95

## 2022-04-20 ENCOUNTER — APPOINTMENT (OUTPATIENT)
Dept: OBGYN | Facility: CLINIC | Age: 37
End: 2022-04-20
Payer: MEDICAID

## 2022-04-20 ENCOUNTER — NON-APPOINTMENT (OUTPATIENT)
Age: 37
End: 2022-04-20

## 2022-04-20 VITALS
HEIGHT: 64 IN | DIASTOLIC BLOOD PRESSURE: 78 MMHG | BODY MASS INDEX: 32.27 KG/M2 | SYSTOLIC BLOOD PRESSURE: 120 MMHG | WEIGHT: 189 LBS

## 2022-04-20 PROCEDURE — 99213 OFFICE O/P EST LOW 20 MIN: CPT | Mod: TH

## 2022-04-21 ENCOUNTER — OUTPATIENT (OUTPATIENT)
Dept: OUTPATIENT SERVICES | Facility: HOSPITAL | Age: 37
LOS: 1 days | Discharge: HOME | End: 2022-04-21

## 2022-04-21 VITALS
TEMPERATURE: 98 F | RESPIRATION RATE: 16 BRPM | HEART RATE: 87 BPM | SYSTOLIC BLOOD PRESSURE: 139 MMHG | DIASTOLIC BLOOD PRESSURE: 85 MMHG

## 2022-04-21 VITALS — OXYGEN SATURATION: 100 % | HEART RATE: 97 BPM

## 2022-04-21 DIAGNOSIS — Z98.890 OTHER SPECIFIED POSTPROCEDURAL STATES: Chronic | ICD-10-CM

## 2022-04-21 DIAGNOSIS — Z3A.00 WEEKS OF GESTATION OF PREGNANCY NOT SPECIFIED: ICD-10-CM

## 2022-04-21 DIAGNOSIS — O26.899 OTHER SPECIFIED PREGNANCY RELATED CONDITIONS, UNSPECIFIED TRIMESTER: ICD-10-CM

## 2022-04-21 LAB
ALBUMIN SERPL ELPH-MCNC: 3.3 G/DL — SIGNIFICANT CHANGE UP (ref 3.3–5)
ALBUMIN SERPL ELPH-MCNC: 3.5 G/DL — SIGNIFICANT CHANGE UP (ref 3.3–5)
ALP SERPL-CCNC: 57 U/L — SIGNIFICANT CHANGE UP (ref 40–120)
ALP SERPL-CCNC: 61 U/L — SIGNIFICANT CHANGE UP (ref 40–120)
ALT FLD-CCNC: 11 U/L — SIGNIFICANT CHANGE UP (ref 4–33)
ALT FLD-CCNC: 15 U/L — SIGNIFICANT CHANGE UP (ref 4–33)
ANION GAP SERPL CALC-SCNC: 12 MMOL/L — SIGNIFICANT CHANGE UP (ref 7–14)
ANION GAP SERPL CALC-SCNC: 13 MMOL/L — SIGNIFICANT CHANGE UP (ref 7–14)
APPEARANCE UR: CLEAR — SIGNIFICANT CHANGE UP
AST SERPL-CCNC: 14 U/L — SIGNIFICANT CHANGE UP (ref 4–32)
AST SERPL-CCNC: 41 U/L — HIGH (ref 4–32)
B-OH-BUTYR SERPL-SCNC: 0.2 MMOL/L — SIGNIFICANT CHANGE UP (ref 0–0.4)
BACTERIA # UR AUTO: NEGATIVE — SIGNIFICANT CHANGE UP
BASOPHILS # BLD AUTO: 0.07 K/UL — SIGNIFICANT CHANGE UP (ref 0–0.2)
BASOPHILS NFR BLD AUTO: 0.6 % — SIGNIFICANT CHANGE UP (ref 0–2)
BILIRUB SERPL-MCNC: 0.2 MG/DL — SIGNIFICANT CHANGE UP (ref 0.2–1.2)
BILIRUB SERPL-MCNC: 0.3 MG/DL — SIGNIFICANT CHANGE UP (ref 0.2–1.2)
BILIRUB UR-MCNC: NEGATIVE — SIGNIFICANT CHANGE UP
BUN SERPL-MCNC: 8 MG/DL — SIGNIFICANT CHANGE UP (ref 7–23)
BUN SERPL-MCNC: 9 MG/DL — SIGNIFICANT CHANGE UP (ref 7–23)
CALCIUM SERPL-MCNC: 8.5 MG/DL — SIGNIFICANT CHANGE UP (ref 8.4–10.5)
CALCIUM SERPL-MCNC: 9 MG/DL — SIGNIFICANT CHANGE UP (ref 8.4–10.5)
CHLORIDE SERPL-SCNC: 101 MMOL/L — SIGNIFICANT CHANGE UP (ref 98–107)
CHLORIDE SERPL-SCNC: 104 MMOL/L — SIGNIFICANT CHANGE UP (ref 98–107)
CO2 SERPL-SCNC: 19 MMOL/L — LOW (ref 22–31)
CO2 SERPL-SCNC: 20 MMOL/L — LOW (ref 22–31)
COLOR SPEC: YELLOW — SIGNIFICANT CHANGE UP
CREAT SERPL-MCNC: 0.4 MG/DL — LOW (ref 0.5–1.3)
CREAT SERPL-MCNC: 0.42 MG/DL — LOW (ref 0.5–1.3)
DIFF PNL FLD: NEGATIVE — SIGNIFICANT CHANGE UP
EGFR: 129 ML/MIN/1.73M2 — SIGNIFICANT CHANGE UP
EGFR: 131 ML/MIN/1.73M2 — SIGNIFICANT CHANGE UP
EOSINOPHIL # BLD AUTO: 0.18 K/UL — SIGNIFICANT CHANGE UP (ref 0–0.5)
EOSINOPHIL NFR BLD AUTO: 1.5 % — SIGNIFICANT CHANGE UP (ref 0–6)
EPI CELLS # UR: 2 /HPF — SIGNIFICANT CHANGE UP (ref 0–5)
GLUCOSE BLDC GLUCOMTR-MCNC: 119 MG/DL — HIGH (ref 70–99)
GLUCOSE BLDC GLUCOMTR-MCNC: 129 MG/DL — HIGH (ref 70–99)
GLUCOSE SERPL-MCNC: 121 MG/DL — HIGH (ref 70–99)
GLUCOSE SERPL-MCNC: 130 MG/DL — HIGH (ref 70–99)
GLUCOSE UR QL: NEGATIVE — SIGNIFICANT CHANGE UP
HCT VFR BLD CALC: 35.6 % — SIGNIFICANT CHANGE UP (ref 34.5–45)
HGB BLD-MCNC: 11.9 G/DL — SIGNIFICANT CHANGE UP (ref 11.5–15.5)
HYALINE CASTS # UR AUTO: 1 /LPF — SIGNIFICANT CHANGE UP (ref 0–7)
IANC: 9.93 K/UL — HIGH (ref 1.8–7.4)
IMM GRANULOCYTES NFR BLD AUTO: 0.7 % — SIGNIFICANT CHANGE UP (ref 0–1.5)
KETONES UR-MCNC: NEGATIVE — SIGNIFICANT CHANGE UP
LEUKOCYTE ESTERASE UR-ACNC: NEGATIVE — SIGNIFICANT CHANGE UP
LYMPHOCYTES # BLD AUTO: 1.39 K/UL — SIGNIFICANT CHANGE UP (ref 1–3.3)
LYMPHOCYTES # BLD AUTO: 11.5 % — LOW (ref 13–44)
MCHC RBC-ENTMCNC: 30 PG — SIGNIFICANT CHANGE UP (ref 27–34)
MCHC RBC-ENTMCNC: 33.4 GM/DL — SIGNIFICANT CHANGE UP (ref 32–36)
MCV RBC AUTO: 89.7 FL — SIGNIFICANT CHANGE UP (ref 80–100)
MONOCYTES # BLD AUTO: 0.46 K/UL — SIGNIFICANT CHANGE UP (ref 0–0.9)
MONOCYTES NFR BLD AUTO: 3.8 % — SIGNIFICANT CHANGE UP (ref 2–14)
NEUTROPHILS # BLD AUTO: 9.93 K/UL — HIGH (ref 1.8–7.4)
NEUTROPHILS NFR BLD AUTO: 81.9 % — HIGH (ref 43–77)
NITRITE UR-MCNC: NEGATIVE — SIGNIFICANT CHANGE UP
NRBC # BLD: 0 /100 WBCS — SIGNIFICANT CHANGE UP
NRBC # FLD: 0 K/UL — SIGNIFICANT CHANGE UP
PH UR: 6 — SIGNIFICANT CHANGE UP (ref 5–8)
PLATELET # BLD AUTO: 255 K/UL — SIGNIFICANT CHANGE UP (ref 150–400)
POTASSIUM SERPL-MCNC: 4.2 MMOL/L — SIGNIFICANT CHANGE UP (ref 3.5–5.3)
POTASSIUM SERPL-MCNC: 5.8 MMOL/L — HIGH (ref 3.5–5.3)
POTASSIUM SERPL-SCNC: 4.2 MMOL/L — SIGNIFICANT CHANGE UP (ref 3.5–5.3)
POTASSIUM SERPL-SCNC: 5.8 MMOL/L — HIGH (ref 3.5–5.3)
PROT SERPL-MCNC: 6.5 G/DL — SIGNIFICANT CHANGE UP (ref 6–8.3)
PROT SERPL-MCNC: 7.1 G/DL — SIGNIFICANT CHANGE UP (ref 6–8.3)
PROT UR-MCNC: ABNORMAL
RBC # BLD: 3.97 M/UL — SIGNIFICANT CHANGE UP (ref 3.8–5.2)
RBC # FLD: 14.5 % — SIGNIFICANT CHANGE UP (ref 10.3–14.5)
RBC CASTS # UR COMP ASSIST: 3 /HPF — SIGNIFICANT CHANGE UP (ref 0–4)
SODIUM SERPL-SCNC: 134 MMOL/L — LOW (ref 135–145)
SODIUM SERPL-SCNC: 135 MMOL/L — SIGNIFICANT CHANGE UP (ref 135–145)
SP GR SPEC: 1.02 — SIGNIFICANT CHANGE UP (ref 1–1.05)
UROBILINOGEN FLD QL: SIGNIFICANT CHANGE UP
WBC # BLD: 12.11 K/UL — HIGH (ref 3.8–10.5)
WBC # FLD AUTO: 12.11 K/UL — HIGH (ref 3.8–10.5)
WBC UR QL: 2 /HPF — SIGNIFICANT CHANGE UP (ref 0–5)

## 2022-04-21 RX ORDER — METOCLOPRAMIDE HCL 10 MG
10 TABLET ORAL ONCE
Refills: 0 | Status: DISCONTINUED | OUTPATIENT
Start: 2022-04-21 | End: 2022-04-21

## 2022-04-21 RX ORDER — ONDANSETRON 8 MG/1
4 TABLET, FILM COATED ORAL ONCE
Refills: 0 | Status: COMPLETED | OUTPATIENT
Start: 2022-04-21 | End: 2022-04-21

## 2022-04-21 RX ORDER — FAMOTIDINE 10 MG/ML
20 INJECTION INTRAVENOUS ONCE
Refills: 0 | Status: COMPLETED | OUTPATIENT
Start: 2022-04-21 | End: 2022-04-21

## 2022-04-21 RX ORDER — SODIUM CHLORIDE 9 MG/ML
1000 INJECTION INTRAMUSCULAR; INTRAVENOUS; SUBCUTANEOUS
Refills: 0 | Status: DISCONTINUED | OUTPATIENT
Start: 2022-04-21 | End: 2022-05-05

## 2022-04-21 RX ORDER — FAMOTIDINE 10 MG/ML
20 INJECTION INTRAVENOUS ONCE
Refills: 0 | Status: DISCONTINUED | OUTPATIENT
Start: 2022-04-21 | End: 2022-04-21

## 2022-04-21 RX ADMIN — FAMOTIDINE 20 MILLIGRAM(S): 10 INJECTION INTRAVENOUS at 02:50

## 2022-04-21 RX ADMIN — ONDANSETRON 4 MILLIGRAM(S): 8 TABLET, FILM COATED ORAL at 02:16

## 2022-04-21 RX ADMIN — SODIUM CHLORIDE 125 MILLILITER(S): 9 INJECTION INTRAMUSCULAR; INTRAVENOUS; SUBCUTANEOUS at 02:11

## 2022-04-21 NOTE — OB PROVIDER TRIAGE NOTE - HISTORY OF PRESENT ILLNESS
Patient is a 37yo  @16w3d who presents with n/v since late last night and poorly controlled T2DM, on admelog 20 TIDAC and NPH 16u qhs. Her T2DM was previously managed by her PCP, and now managed by an OB diabetes educator. Patient reports fasting FSG yesterday am 79, ate breakfast and administered mealtime insulin as prescribed (20u admelog). 2h postprandial FSG noted to be 180 at which time patient administered an additional 10u of admelog to normalize blood glucose. Patient states she was never instructed to self titrate, but knew that tight glycemic control was important and she was concerned about the baby.  1.5h later her FSG 66, at which time she then ate pizza and garlic knots. Her 2h postprandial FSG once again was elevated to 190 which she treated with 16u of admelog. 1h later her FSG was unchanged at 190 and she administered an additional 10u admelog. Shortly there after she reported feeling dizzy and her repeat FSG was 66. By late evening (~9-10p) in addition to her dizziness she developed N/V. Her attempts to correct her blood sugar at that time via PO intake were unsuccessful as she could no longer tolerate PO. She denies any fevers, chills, abdominal pain, or sick contacts. Aside from breakfast, patient did not take her mealtime insulin and she has not yet taken her bedtime NPH.     OBH: TOP x2 ( D&Cx1,  Med AB),  FT  GDMA1 7#10,  FT  GDMA2 7#8  GYNH: Denies  PMH: T2DM (diagnosed in the postpartum period following  pregnancy), Asthma (last attack )  PSH: D&C x1  Rx: 20u admelog TIDAC, 26u NPH qhs, ASA, PNV, Nebulizer PRN  All: Shrimp, NKDA  Psych: Denies  Social: Denies

## 2022-04-21 NOTE — OB PROVIDER TRIAGE NOTE - NSOBPROVIDERNOTE_OBGYN_ALL_OB_FT
Patient is a 35yo  @16w3d who presents with n/v since late last night following attempts at self titration of home insulin dosing.     - on arrival.   -Patient VSS an wnl, fetal status reassuring on BSUS.   -Patient counseled on the dangers of self imposed self titration of insulin at home and that any alterations in regimen should be in conjunction with the diabetes educator.  -Will send CBC, CMP, BHB, UA  -NS bolus followed by 125cc/h  -Suri Mai, PGY4  D/W Dr. Chamberlain Patient is a 35yo  @16w3d who presents with n/v since late last night following attempts at self titration of home insulin dosing.     - on arrival.   -Patient VSS an wnl, fetal status reassuring on BSUS.   -Patient counseled on the dangers of self imposed self titration of insulin at home and that any alterations in regimen should be in conjunction with the diabetes educator.  -Will send CBC, CMP, BHB, UA  -NS bolus followed by 125cc/h  -Zofran and pepcid    Roopa Mai, PGY4  D/W Dr. Chamberlain    PGY4 Update  445a  Labs reviewed, wnl. VSS and wnl. Repeat . Patient re-evaluated bedside. She reports much improved, nausea has resolved. However she still feels dizzy and that the room is spinning, though not as severe as before. Patient s/p IVF bolus. Continue maintenance IVF @125cc/h. Will re-evaluate patient in 1h.    Roopa Mai, PGY4 Patient is a 37yo  @16w3d who presents with n/v since late last night following attempts at self titration of home insulin dosing.     - on arrival.   -Patient VSS an wnl, fetal status reassuring on BSUS.   -Patient counseled on the dangers of self imposed self titration of insulin at home and that any alterations in regimen should be in conjunction with the diabetes educator.  -Will send CBC, CMP, BHB, UA  -NS bolus followed by 125cc/h  -Zofran and pepcid    Roopa Mai, PGY4  D/W Dr. Chamberlain    PGY4 Update  445a  Labs reviewed, wnl. VSS and wnl. Repeat . Patient re-evaluated bedside. She reports much improved, nausea has resolved. However she still feels dizzy and that the room is spinning, though not as severe as before. Patient s/p IVF bolus. Continue maintenance IVF @125cc/h. Will re-evaluate patient in 1h.    Roopa Mai, PGY4    PGY4 Update  600  Patient once again evaluated bedside. VSS and wnl. She still feels as though the room has a spinning quality to it, however is able to ambulate to the bathroom and notes this has improved since initial presentation though not yet completely resolved. PO challenge... As remainder of patient symptoms have resolved and work up benign, recommend discharge home with outpatient follow up as scheduled. Patient scheduled to meet with OB diabetes educator tomorrow. Instructed patient to continue to take her insulin today AS PRESCRIBED, without any self titrations. Additionally, as patient did not take her NPH last night, counseled patient to expect elevated FSG today and to call diabetes educator or OB office if any >200. Patient counseled regarding appropriate diet choices and ambulation following meals.     Roopa Mai, PGY4  D/W Dr. Chamberlain Patient is a 37yo  @16w3d who presents with n/v since late last night following attempts at self titration of home insulin dosing.     - on arrival.   -Patient VSS an wnl, fetal status reassuring on BSUS.   -Patient counseled on the dangers of self imposed self titration of insulin at home and that any alterations in regimen should be in conjunction with the diabetes educator.  -Will send CBC, CMP, BHB, UA  -NS bolus followed by 125cc/h  -Zofran and pepcid    Roopa Mai, PGY4  D/W Dr. Chamberlain    PGY4 Update  445a  Labs reviewed, wnl. VSS and wnl. Repeat . Patient re-evaluated bedside. She reports much improved, nausea has resolved. However she still feels dizzy and that the room is spinning, though not as severe as before. Patient s/p IVF bolus. Continue maintenance IVF @125cc/h. Will re-evaluate patient in 1h.    Roopa Mai, PGY4    PGY4 Update  600  Patient once again evaluated bedside. VSS and wnl. She still feels as though the room has a spinning quality to it, however is able to ambulate to the bathroom and notes this has improved since initial presentation though not yet completely resolved. PO challenge (crackers and water) successful. As remainder of patient symptoms have resolved and work up benign, recommend discharge home with outpatient follow up as scheduled. Patient scheduled to meet with OB diabetes educator tomorrow. Instructed patient to continue to take her insulin today AS PRESCRIBED, without any self titrations. Additionally, as patient did not take her NPH last night, counseled patient to expect elevated FSG today and to call diabetes educator or OB office if any >200. Patient counseled regarding appropriate diet choices and ambulation following meals. Recommend rest and continued hydration, expect vertigo like symptoms related to N/V and dehydration. Instructed to return to labor and delivery if persists.    Roopa Mai, PGY4  D/W Dr. Chamberlain

## 2022-04-21 NOTE — OB RN TRIAGE NOTE - NSNURSINGINSTR_OBGYN_ALL_OB_FT
Patient discharged home with discharge instructions.  Patient verbalizes understanding.  all questions answered.

## 2022-04-21 NOTE — OB PROVIDER TRIAGE NOTE - NSHPPHYSICALEXAM_GEN_ALL_CORE
VS  T(C): 36.8 (04-21-22 @ 01:17)  HR: 81 (04-21-22 @ 02:49)  BP: 125/74 (04-21-22 @ 02:50)  RR: 16 (04-21-22 @ 01:17)  SpO2: 99% (04-21-22 @ 02:49)    Physical Exam:  General: NAD  CV: S1/S2+, RRR  Lungs: CTA Roger  Abdomen: Soft, NTTP, BS+ (Fundus?)    BSUS: Vertex Presentation, Posterior placenta, FHR 155BPM, +fetal movement, MVP 3.17 VS  T(C): 36.8 (04-21-22 @ 01:17)  HR: 81 (04-21-22 @ 02:49)  BP: 125/74 (04-21-22 @ 02:50)  RR: 16 (04-21-22 @ 01:17)  SpO2: 99% (04-21-22 @ 02:49)    Physical Exam:  General: NAD  CV: S1/S2+, RRR  Lungs: CTA Roger  Abdomen: Soft, NTTP    BSUS: Vertex Presentation, Posterior placenta, FHR 155BPM, +fetal movement, MVP 3.17

## 2022-04-21 NOTE — OB RN TRIAGE NOTE - FALL HARM RISK - UNIVERSAL INTERVENTIONS
Bed in lowest position, wheels locked, appropriate side rails in place/Call bell, personal items and telephone in reach/Instruct patient to call for assistance before getting out of bed or chair/Non-slip footwear when patient is out of bed/[place] to call system/Physically safe environment - no spills, clutter or unnecessary equipment/Purposeful Proactive Rounding/Room/bathroom lighting operational, light cord in reach

## 2022-04-21 NOTE — OB RN TRIAGE NOTE - CHIEF COMPLAINT QUOTE
Yesterday in the 10 am ate breakfast at 1230 fingerstick 180, took Admelog 10 units, 1400 fingerstick 66 1430 had pizza at 1530 fingerstick 190 took another dose of Admelog 10 units at 1800 fingerstick was 70.  1900 started to feel dizzy.  2230 vomiting started. 2330 fingerstick 67

## 2022-04-22 ENCOUNTER — APPOINTMENT (OUTPATIENT)
Dept: MATERNAL FETAL MEDICINE | Facility: CLINIC | Age: 37
End: 2022-04-22

## 2022-04-22 ENCOUNTER — OUTPATIENT (OUTPATIENT)
Dept: INPATIENT UNIT | Facility: HOSPITAL | Age: 37
LOS: 1 days | Discharge: HOME | End: 2022-04-22
Payer: MEDICAID

## 2022-04-22 ENCOUNTER — NON-APPOINTMENT (OUTPATIENT)
Age: 37
End: 2022-04-22

## 2022-04-22 VITALS
HEART RATE: 109 BPM | RESPIRATION RATE: 16 BRPM | SYSTOLIC BLOOD PRESSURE: 114 MMHG | TEMPERATURE: 99 F | DIASTOLIC BLOOD PRESSURE: 67 MMHG

## 2022-04-22 VITALS — SYSTOLIC BLOOD PRESSURE: 153 MMHG | HEART RATE: 100 BPM | DIASTOLIC BLOOD PRESSURE: 87 MMHG

## 2022-04-22 DIAGNOSIS — O26.899 OTHER SPECIFIED PREGNANCY RELATED CONDITIONS, UNSPECIFIED TRIMESTER: ICD-10-CM

## 2022-04-22 DIAGNOSIS — Z3A.00 WEEKS OF GESTATION OF PREGNANCY NOT SPECIFIED: ICD-10-CM

## 2022-04-22 DIAGNOSIS — Z98.890 OTHER SPECIFIED POSTPROCEDURAL STATES: Chronic | ICD-10-CM

## 2022-04-22 PROBLEM — E11.9 TYPE 2 DIABETES MELLITUS WITHOUT COMPLICATIONS: Chronic | Status: ACTIVE | Noted: 2022-04-21

## 2022-04-22 PROBLEM — J45.909 UNSPECIFIED ASTHMA, UNCOMPLICATED: Chronic | Status: ACTIVE | Noted: 2020-12-16

## 2022-04-22 LAB
ALBUMIN SERPL ELPH-MCNC: 4 G/DL — SIGNIFICANT CHANGE UP (ref 3.3–5)
ALP SERPL-CCNC: 68 U/L — SIGNIFICANT CHANGE UP (ref 40–120)
ALT FLD-CCNC: 12 U/L — SIGNIFICANT CHANGE UP (ref 4–33)
AMYLASE P1 CFR SERPL: 63 U/L — SIGNIFICANT CHANGE UP (ref 25–125)
ANION GAP SERPL CALC-SCNC: 14 MMOL/L — SIGNIFICANT CHANGE UP (ref 7–14)
APPEARANCE UR: CLEAR — SIGNIFICANT CHANGE UP
AST SERPL-CCNC: 14 U/L — SIGNIFICANT CHANGE UP (ref 4–32)
B-OH-BUTYR SERPL-SCNC: 0.2 MMOL/L — SIGNIFICANT CHANGE UP (ref 0–0.4)
BACTERIA # UR AUTO: ABNORMAL
BASOPHILS # BLD AUTO: 0.05 K/UL — SIGNIFICANT CHANGE UP (ref 0–0.2)
BASOPHILS NFR BLD AUTO: 0.5 % — SIGNIFICANT CHANGE UP (ref 0–2)
BILIRUB SERPL-MCNC: 0.5 MG/DL — SIGNIFICANT CHANGE UP (ref 0.2–1.2)
BILIRUB UR-MCNC: NEGATIVE — SIGNIFICANT CHANGE UP
BUN SERPL-MCNC: 7 MG/DL — SIGNIFICANT CHANGE UP (ref 7–23)
CALCIUM SERPL-MCNC: 9.3 MG/DL — SIGNIFICANT CHANGE UP (ref 8.4–10.5)
CHLORIDE SERPL-SCNC: 106 MMOL/L — SIGNIFICANT CHANGE UP (ref 98–107)
CO2 SERPL-SCNC: 21 MMOL/L — LOW (ref 22–31)
COLOR SPEC: YELLOW — SIGNIFICANT CHANGE UP
CREAT SERPL-MCNC: 0.46 MG/DL — LOW (ref 0.5–1.3)
DIFF PNL FLD: NEGATIVE — SIGNIFICANT CHANGE UP
EGFR: 126 ML/MIN/1.73M2 — SIGNIFICANT CHANGE UP
EOSINOPHIL # BLD AUTO: 0.09 K/UL — SIGNIFICANT CHANGE UP (ref 0–0.5)
EOSINOPHIL NFR BLD AUTO: 0.9 % — SIGNIFICANT CHANGE UP (ref 0–6)
EPI CELLS # UR: 1 /HPF — SIGNIFICANT CHANGE UP (ref 0–5)
GLUCOSE BLDC GLUCOMTR-MCNC: 125 MG/DL — HIGH (ref 70–99)
GLUCOSE SERPL-MCNC: 144 MG/DL — HIGH (ref 70–99)
GLUCOSE UR QL: NEGATIVE — SIGNIFICANT CHANGE UP
HCT VFR BLD CALC: 38.4 % — SIGNIFICANT CHANGE UP (ref 34.5–45)
HGB BLD-MCNC: 12.8 G/DL — SIGNIFICANT CHANGE UP (ref 11.5–15.5)
HYALINE CASTS # UR AUTO: 4 /LPF — SIGNIFICANT CHANGE UP (ref 0–7)
IANC: 8.48 K/UL — HIGH (ref 1.8–7.4)
IMM GRANULOCYTES NFR BLD AUTO: 0.5 % — SIGNIFICANT CHANGE UP (ref 0–1.5)
KETONES UR-MCNC: ABNORMAL
LEUKOCYTE ESTERASE UR-ACNC: ABNORMAL
LIDOCAIN IGE QN: 27 U/L — SIGNIFICANT CHANGE UP (ref 7–60)
LYMPHOCYTES # BLD AUTO: 1.47 K/UL — SIGNIFICANT CHANGE UP (ref 1–3.3)
LYMPHOCYTES # BLD AUTO: 14 % — SIGNIFICANT CHANGE UP (ref 13–44)
MCHC RBC-ENTMCNC: 30.3 PG — SIGNIFICANT CHANGE UP (ref 27–34)
MCHC RBC-ENTMCNC: 33.3 GM/DL — SIGNIFICANT CHANGE UP (ref 32–36)
MCV RBC AUTO: 90.8 FL — SIGNIFICANT CHANGE UP (ref 80–100)
MONOCYTES # BLD AUTO: 0.38 K/UL — SIGNIFICANT CHANGE UP (ref 0–0.9)
MONOCYTES NFR BLD AUTO: 3.6 % — SIGNIFICANT CHANGE UP (ref 2–14)
NEUTROPHILS # BLD AUTO: 8.48 K/UL — HIGH (ref 1.8–7.4)
NEUTROPHILS NFR BLD AUTO: 80.5 % — HIGH (ref 43–77)
NITRITE UR-MCNC: NEGATIVE — SIGNIFICANT CHANGE UP
NRBC # BLD: 0 /100 WBCS — SIGNIFICANT CHANGE UP
NRBC # FLD: 0 K/UL — SIGNIFICANT CHANGE UP
PH UR: 6 — SIGNIFICANT CHANGE UP (ref 5–8)
PLATELET # BLD AUTO: 296 K/UL — SIGNIFICANT CHANGE UP (ref 150–400)
POTASSIUM SERPL-MCNC: 4 MMOL/L — SIGNIFICANT CHANGE UP (ref 3.5–5.3)
POTASSIUM SERPL-SCNC: 4 MMOL/L — SIGNIFICANT CHANGE UP (ref 3.5–5.3)
PROT SERPL-MCNC: 7.4 G/DL — SIGNIFICANT CHANGE UP (ref 6–8.3)
PROT UR-MCNC: ABNORMAL
RBC # BLD: 4.23 M/UL — SIGNIFICANT CHANGE UP (ref 3.8–5.2)
RBC # FLD: 14.9 % — HIGH (ref 10.3–14.5)
RBC CASTS # UR COMP ASSIST: 3 /HPF — SIGNIFICANT CHANGE UP (ref 0–4)
SODIUM SERPL-SCNC: 141 MMOL/L — SIGNIFICANT CHANGE UP (ref 135–145)
SP GR SPEC: 1.02 — SIGNIFICANT CHANGE UP (ref 1–1.05)
UROBILINOGEN FLD QL: SIGNIFICANT CHANGE UP
WBC # BLD: 10.52 K/UL — HIGH (ref 3.8–10.5)
WBC # FLD AUTO: 10.52 K/UL — HIGH (ref 3.8–10.5)
WBC UR QL: 29 /HPF — HIGH (ref 0–5)

## 2022-04-22 PROCEDURE — 99214 OFFICE O/P EST MOD 30 MIN: CPT | Mod: 25

## 2022-04-22 PROCEDURE — 76817 TRANSVAGINAL US OBSTETRIC: CPT | Mod: 26

## 2022-04-22 PROCEDURE — 76805 OB US >/= 14 WKS SNGL FETUS: CPT | Mod: 26

## 2022-04-22 RX ORDER — ONDANSETRON 8 MG/1
4 TABLET, FILM COATED ORAL ONCE
Refills: 0 | Status: COMPLETED | OUTPATIENT
Start: 2022-04-22 | End: 2022-04-22

## 2022-04-22 RX ORDER — SODIUM CHLORIDE 9 MG/ML
500 INJECTION INTRAMUSCULAR; INTRAVENOUS; SUBCUTANEOUS ONCE
Refills: 0 | Status: COMPLETED | OUTPATIENT
Start: 2022-04-22 | End: 2022-04-22

## 2022-04-22 RX ORDER — SODIUM CHLORIDE 9 MG/ML
1000 INJECTION INTRAMUSCULAR; INTRAVENOUS; SUBCUTANEOUS ONCE
Refills: 0 | Status: COMPLETED | OUTPATIENT
Start: 2022-04-22 | End: 2022-04-22

## 2022-04-22 RX ADMIN — SODIUM CHLORIDE 2000 MILLILITER(S): 9 INJECTION INTRAMUSCULAR; INTRAVENOUS; SUBCUTANEOUS at 16:54

## 2022-04-22 RX ADMIN — ONDANSETRON 4 MILLIGRAM(S): 8 TABLET, FILM COATED ORAL at 16:06

## 2022-04-22 RX ADMIN — SODIUM CHLORIDE 500 MILLILITER(S): 9 INJECTION INTRAMUSCULAR; INTRAVENOUS; SUBCUTANEOUS at 16:05

## 2022-04-22 NOTE — OB PROVIDER TRIAGE NOTE - NSHPLABSRESULTS_GEN_ALL_CORE
CBC Full  -  ( 2022 16:20 )  WBC Count : 10.52 K/uL  RBC Count : 4.23 M/uL  Hemoglobin : 12.8 g/dL  Hematocrit : 38.4 %  Platelet Count - Automated : 296 K/uL  Mean Cell Volume : 90.8 fL  Mean Cell Hemoglobin : 30.3 pg  Mean Cell Hemoglobin Concentration : 33.3 gm/dL  Auto Neutrophil # : 8.48 K/uL  Auto Lymphocyte # : 1.47 K/uL  Auto Monocyte # : 0.38 K/uL  Auto Eosinophil # : 0.09 K/uL  Auto Basophil # : 0.05 K/uL  Auto Neutrophil % : 80.5 %  Auto Lymphocyte % : 14.0 %  Auto Monocyte % : 3.6 %  Auto Eosinophil % : 0.9 %  Auto Basophil % : 0.5 %        141  |  106  |  7   ----------------------------<  144<H>  4.0   |  21<L>  |  0.46<L>    Ca    9.3      2022 16:20    TPro  7.4  /  Alb  4.0  /  TBili  0.5  /  DBili  x   /  AST  14  /  ALT  12  /  AlkPhos  68  -    Urinalysis Basic - ( 2022 16:20 )    Color: Yellow / Appearance: Clear / S.021 / pH: x  Gluc: x / Ketone: Small  / Bili: Negative / Urobili: <2 mg/dL   Blood: x / Protein: Trace / Nitrite: Negative   Leuk Esterase: Moderate / RBC: 3 /HPF / WBC 29 /HPF   Sq Epi: x / Non Sq Epi: 1 /HPF / Bacteria: Moderate

## 2022-04-22 NOTE — OB PROVIDER TRIAGE NOTE - ADDITIONAL INSTRUCTIONS
Please drink 1-2 liters of fluid per day. Please eat 6 small meals throughout the day. Please take your insulin as prescribed and monitor your blood sugar as discussed. Please go to your next scheduled prenatal appointment.

## 2022-04-22 NOTE — OB PROVIDER TRIAGE NOTE - NSOBPROVIDERNOTE_OBGYN_ALL_OB_FT
a/p: 36 y.o.  TIMMY 10/3/2022 @ 16.3 weeks     UA, CBC, CMP, amylase/lipase, coag panel pending  0.9 NS 1000 mL IV bolus x 1  zofran 4mg IVP x 1    1633  Dr Prieto and Dr Vaz speaking with patient and patient  at bedside. Pt counseled extensively re: glucose control and dietary planning. Pt states understanding.     pt for PO trial    1800  Pt tolerated PO trial. Discussed with Dr Molina. Plan for discharge home.     184 Dr Molina at bedside speaking with patient. Plan for discharge home.  precautions reviewed. Encouraged increased PO hydration and frequent small wheels per Dr Prieto's counseling. Pt to follow up with next scheduled OB appointment.    Meredith, NP a/p: 36 y.o.  TIMMY 10/3/2022 @ 16.3 weeks     UA, CBC, CMP, amylase/lipase, coag panel pending  0.9 NS 1000 mL IV bolus x 1  zofran 4mg IVP x 1     @ 1445    1633  Dr Prieto and Dr Vaz speaking with patient and patient  at bedside. Pt counseled extensively re: glucose control and dietary planning. Pt states understanding.     pt for PO trial     @ 1810    1800  Pt tolerated PO trial. Discussed with Dr Molina. Plan for discharge home.     1842 Dr Molina at bedside speaking with patient. Plan for discharge home.  precautions reviewed. Encouraged increased PO hydration and frequent small wheels per Dr Prieto's counseling. Pt to follow up with next scheduled OB appointment.    Meredith, NP

## 2022-04-24 ENCOUNTER — TRANSCRIPTION ENCOUNTER (OUTPATIENT)
Age: 37
End: 2022-04-24

## 2022-04-25 ENCOUNTER — EMERGENCY (EMERGENCY)
Facility: HOSPITAL | Age: 37
LOS: 1 days | Discharge: HOME | End: 2022-04-25
Attending: EMERGENCY MEDICINE | Admitting: EMERGENCY MEDICINE
Payer: MEDICAID

## 2022-04-25 ENCOUNTER — NON-APPOINTMENT (OUTPATIENT)
Age: 37
End: 2022-04-25

## 2022-04-25 ENCOUNTER — OUTPATIENT (OUTPATIENT)
Dept: INPATIENT UNIT | Facility: HOSPITAL | Age: 37
LOS: 1 days | Discharge: HOME | End: 2022-04-25
Payer: MEDICAID

## 2022-04-25 VITALS — HEART RATE: 70 BPM | DIASTOLIC BLOOD PRESSURE: 59 MMHG | SYSTOLIC BLOOD PRESSURE: 101 MMHG

## 2022-04-25 VITALS
DIASTOLIC BLOOD PRESSURE: 87 MMHG | OXYGEN SATURATION: 100 % | TEMPERATURE: 98 F | HEIGHT: 65 IN | SYSTOLIC BLOOD PRESSURE: 122 MMHG | RESPIRATION RATE: 20 BRPM | HEART RATE: 95 BPM

## 2022-04-25 VITALS
HEART RATE: 99 BPM | DIASTOLIC BLOOD PRESSURE: 74 MMHG | RESPIRATION RATE: 18 BRPM | TEMPERATURE: 99 F | SYSTOLIC BLOOD PRESSURE: 117 MMHG

## 2022-04-25 DIAGNOSIS — O26.899 OTHER SPECIFIED PREGNANCY RELATED CONDITIONS, UNSPECIFIED TRIMESTER: ICD-10-CM

## 2022-04-25 DIAGNOSIS — Z3A.00 WEEKS OF GESTATION OF PREGNANCY NOT SPECIFIED: ICD-10-CM

## 2022-04-25 DIAGNOSIS — Z98.890 OTHER SPECIFIED POSTPROCEDURAL STATES: Chronic | ICD-10-CM

## 2022-04-25 LAB
2ND TRIMESTER DATA: NORMAL
AFP PNL SERPL: NORMAL
AFP SERPL-ACNC: NORMAL
ALBUMIN SERPL ELPH-MCNC: 3.8 G/DL — SIGNIFICANT CHANGE UP (ref 3.3–5)
ALP SERPL-CCNC: 70 U/L — SIGNIFICANT CHANGE UP (ref 40–120)
ALT FLD-CCNC: 11 U/L — SIGNIFICANT CHANGE UP (ref 4–33)
ANION GAP SERPL CALC-SCNC: 12 MMOL/L — SIGNIFICANT CHANGE UP (ref 7–14)
AST SERPL-CCNC: 16 U/L — SIGNIFICANT CHANGE UP (ref 4–32)
BILIRUB SERPL-MCNC: 0.4 MG/DL — SIGNIFICANT CHANGE UP (ref 0.2–1.2)
BUN SERPL-MCNC: 8 MG/DL — SIGNIFICANT CHANGE UP (ref 7–23)
CALCIUM SERPL-MCNC: 9.2 MG/DL — SIGNIFICANT CHANGE UP (ref 8.4–10.5)
CHLORIDE SERPL-SCNC: 103 MMOL/L — SIGNIFICANT CHANGE UP (ref 98–107)
CLINICAL BIOCHEMIST REVIEW: NORMAL
CO2 SERPL-SCNC: 22 MMOL/L — SIGNIFICANT CHANGE UP (ref 22–31)
CREAT SERPL-MCNC: 0.61 MG/DL — SIGNIFICANT CHANGE UP (ref 0.5–1.3)
EGFR: 118 ML/MIN/1.73M2 — SIGNIFICANT CHANGE UP
GLUCOSE BLDC GLUCOMTR-MCNC: 102 MG/DL — HIGH (ref 70–99)
GLUCOSE SERPL-MCNC: 84 MG/DL — SIGNIFICANT CHANGE UP (ref 70–99)
HCT VFR BLD CALC: 39.1 % — SIGNIFICANT CHANGE UP (ref 34.5–45)
HGB BLD-MCNC: 12.9 G/DL — SIGNIFICANT CHANGE UP (ref 11.5–15.5)
MCHC RBC-ENTMCNC: 30.1 PG — SIGNIFICANT CHANGE UP (ref 27–34)
MCHC RBC-ENTMCNC: 33 GM/DL — SIGNIFICANT CHANGE UP (ref 32–36)
MCV RBC AUTO: 91.1 FL — SIGNIFICANT CHANGE UP (ref 80–100)
NOTES NTD: NORMAL
NRBC # BLD: 0 /100 WBCS — SIGNIFICANT CHANGE UP
NRBC # FLD: 0 K/UL — SIGNIFICANT CHANGE UP
PLATELET # BLD AUTO: 295 K/UL — SIGNIFICANT CHANGE UP (ref 150–400)
POTASSIUM SERPL-MCNC: 3.8 MMOL/L — SIGNIFICANT CHANGE UP (ref 3.5–5.3)
POTASSIUM SERPL-SCNC: 3.8 MMOL/L — SIGNIFICANT CHANGE UP (ref 3.5–5.3)
PROT SERPL-MCNC: 7.1 G/DL — SIGNIFICANT CHANGE UP (ref 6–8.3)
RBC # BLD: 4.29 M/UL — SIGNIFICANT CHANGE UP (ref 3.8–5.2)
RBC # FLD: 14.6 % — HIGH (ref 10.3–14.5)
SODIUM SERPL-SCNC: 137 MMOL/L — SIGNIFICANT CHANGE UP (ref 135–145)
WBC # BLD: 13.38 K/UL — HIGH (ref 3.8–10.5)
WBC # FLD AUTO: 13.38 K/UL — HIGH (ref 3.8–10.5)

## 2022-04-25 PROCEDURE — 99284 EMERGENCY DEPT VISIT MOD MDM: CPT

## 2022-04-25 PROCEDURE — 99214 OFFICE O/P EST MOD 30 MIN: CPT

## 2022-04-25 PROCEDURE — 76815 OB US LIMITED FETUS(S): CPT | Mod: 26

## 2022-04-25 RX ORDER — MECLIZINE HCL 12.5 MG
25 TABLET ORAL ONCE
Refills: 0 | Status: COMPLETED | OUTPATIENT
Start: 2022-04-25 | End: 2022-04-25

## 2022-04-25 RX ORDER — MECLIZINE HCL 12.5 MG
1 TABLET ORAL
Qty: 21 | Refills: 0
Start: 2022-04-25 | End: 2022-05-01

## 2022-04-25 RX ADMIN — Medication 25 MILLIGRAM(S): at 20:03

## 2022-04-25 NOTE — OB PROVIDER TRIAGE NOTE - ADDITIONAL INSTRUCTIONS
Patient was encouraged to follow diabetic diet, increase PO fluid intake;  labor precautions reviewed; if relief of symptoms, to follow up with PMD within 3 days.

## 2022-04-25 NOTE — ED PROVIDER NOTE - NEURO NEGATIVE STATEMENT, MLM
no loss of consciousness, no gait abnormality, no headache, no sensory deficits, and no weakness. +room spinning dizziness

## 2022-04-25 NOTE — OB PROVIDER TRIAGE NOTE - NSOBPROVIDERNOTE_OBGYN_ALL_OB_FT
35yo , EGA@17 weeks, presented to D&T with c/o of  nausea and vomiting (6 times) on 2022, vomited once ever day since, today c/o feeling dizzy. Patient is able to tolerate food and fluid.   Patient denies contractions, denies vaginal bleeding, leakage of fluid.  Denies fever, chills, headaches, changes in vision, chest pain, palpitations, shortness of breath, cough, nausea, vomiting, diarrhea, constipation, urinary symptoms, edema.    Prenatal care with Dr Molina.   Prenatal course is significant for type 2 DM.    Patient denies signs and symptoms of COVID 19; denies symptomatic illness; fully vaccinated.    No adverse reactions to anesthesia, no objections to blood transfusions if clinically indicated.  OB hx: 2008, , [id number], GDM, A1            2021, , [id number], GDM, A2            2 TOPs, 1 D&C,   Med hx: type 2 DM, obesity; asthma (last attack 2021)  Surg hx: 2006, D&C  GYN hx: denies hx of abnormal papsmear/cysts/fibroids/STDs  Meds: PNV qd; Admelog 20 Units pre meals TID; Humulin 26 Units qh    No Known Drug Allergies  shrimp (Swelling)      Social hx: Denies alcohol, tobacco, drug use  Psych hx: denies hx of anxiety/depression; lives with 2 kids and spouse     PHYSICAL EXAM  Vital Signs:  T(C): 36.7 (2022 15:06), Max: 37 (2022 14:52)  T(F): 98.06 (2022 15:06), Max: 98.6 (2022 14:52)  HR: 88 (2022 15:06) (88 - 99)  BP: 131/77 (2022 15:06) (117/74 - 131/77)  RR: 18 (2022 14:52) (18 - 18)    Gen: NAD  Head: NC/AT  Cardio: S1S2+, RRR  Resp: CTABL, no wheezing  Abdomen: Soft, NT/ND, BS+  Extremities: No LE edema bilaterally    bedside sonogram: viable fetus, measuring 16.2 weeks, +movements,  bpm, posterior placenta, cervix appears closed/long transabdominally    (1 hour postprandial)    A:  35yo , EGA@17 weeks, type 2 DM, euglycemic, with c/o dizziness      Plan: CBC, CMP sent; case was reviewed with Dr Prieto, was advised to discharge patient home is lab results wnl.  Patient was encouraged to follow diabetic diet, increase PO fluid intake;  labor precautions reviewed; if relief of symptoms, to follow up with PMD within 3 days.  Prior notes, lab results (prenatal chart review, prenatal labs) and recommendations were reviewed;   All ordered tests results reviewed and interpreted.   Plan of care was reviewed with patient and family; patient states understanding of the above plan.  In total 35 minutes spent with established.    Em Kurtz CNM     22 @ 15:46 37yo , EGA@17 weeks, presented to D&T with c/o of  nausea and vomiting (6 times) on 2022, vomited once ever day since, today c/o feeling dizzy. Patient is able to tolerate food and fluid.   Patient denies contractions, denies vaginal bleeding, leakage of fluid.  Denies fever, chills, headaches, changes in vision, chest pain, palpitations, shortness of breath, cough, nausea, vomiting, diarrhea, constipation, urinary symptoms, edema.    Prenatal care with Dr Molina.   Prenatal course is significant for type 2 DM.    Patient denies signs and symptoms of COVID 19; denies symptomatic illness; fully vaccinated.    No adverse reactions to anesthesia, no objections to blood transfusions if clinically indicated.  OB hx: 2008, , [id number], GDM, A1            2021, , [id number], GDM, A2            2 TOPs, 1 D&C,   Med hx: type 2 DM, obesity; asthma (last attack 2021)  Surg hx: 2006, D&C  GYN hx: denies hx of abnormal papsmear/cysts/fibroids/STDs  Meds: PNV qd; Admelog 20 Units pre meals TID; Humulin 26 Units qh    No Known Drug Allergies  shrimp (Swelling)      Social hx: Denies alcohol, tobacco, drug use  Psych hx: denies hx of anxiety/depression; lives with 2 kids and spouse     PHYSICAL EXAM  Vital Signs:  T(C): 36.7 (2022 15:06), Max: 37 (2022 14:52)  T(F): 98.06 (2022 15:06), Max: 98.6 (2022 14:52)  HR: 88 (2022 15:06) (88 - 99)  BP: 131/77 (2022 15:06) (117/74 - 131/77)  RR: 18 (2022 14:52) (18 - 18)    Gen: NAD  Head: NC/AT  Cardio: S1S2+, RRR  Resp: CTABL, no wheezing  Abdomen: Soft, NT/ND, BS+  Extremities: No LE edema bilaterally    bedside sonogram: viable fetus, measuring 16.2 weeks, +movements,  bpm, posterior placenta, cervix appears closed/long transabdominally    (1 hour postprandial)    A:  37yo , EGA@17 weeks, type 2 DM, euglycemic, with c/o dizziness      Plan: CBC, CMP sent; case was reviewed with Dr Prieto, was advised to discharge patient home is lab results wnl.    addendum at 16:50: patient appears in no distress;                        12.9   13.38 )-----------( 295      ( 2022 16:03 )             39.1       137  |  103  |  8   ----------------------------<  84  3.8   |  22  |  0.61    Ca    9.2      2022 16:03    TPro  7.1  /  Alb  3.8  /  TBili  0.4  /  DBili  x   /  AST  16  /  ALT  11  /  AlkPhos  70      Patient was encouraged to follow diabetic diet, increase PO fluid intake;  labor precautions reviewed; if relief of symptoms, to follow up with PMD within 3 days.  Prior notes, lab results (prenatal chart review, prenatal labs) and recommendations were reviewed;   All ordered tests results reviewed and interpreted.   Plan of care was reviewed with patient and family; patient states understanding of the above plan.  In total 35 minutes spent with established.    Em Kurtz CNM     22 @ 15:46 35yo , EGA@17 weeks, presented to D&T with c/o of  nausea and vomiting (6 times) on 2022, vomited once ever day since, today c/o feeling dizzy. Patient is able to tolerate food and fluid.   Patient denies contractions, denies vaginal bleeding, leakage of fluid.  Denies fever, chills, headaches, changes in vision, chest pain, palpitations, shortness of breath, cough, nausea, vomiting, diarrhea, constipation, urinary symptoms, edema.    Prenatal care with Dr Molina.   Prenatal course is significant for type 2 DM.    Patient denies signs and symptoms of COVID 19; denies symptomatic illness; fully vaccinated.    No adverse reactions to anesthesia, no objections to blood transfusions if clinically indicated.  OB hx: 2008, , [id number], GDM, A1            2021, , [id number], GDM, A2            2 TOPs, 1 D&C,   Med hx: type 2 DM, obesity; asthma (last attack 2021)  Surg hx: 2006, D&C  GYN hx: denies hx of abnormal papsmear/cysts/fibroids/STDs  Meds: PNV qd; Admelog 20 Units pre meals TID; Humulin 26 Units qh    No Known Drug Allergies  shrimp (Swelling)      Social hx: Denies alcohol, tobacco, drug use  Psych hx: denies hx of anxiety/depression; lives with 2 kids and spouse     PHYSICAL EXAM  Vital Signs:  T(C): 36.7 (2022 15:06), Max: 37 (2022 14:52)  T(F): 98.06 (2022 15:06), Max: 98.6 (2022 14:52)  HR: 88 (2022 15:06) (88 - 99)  BP: 131/77 (2022 15:06) (117/74 - 131/77)  RR: 18 (2022 14:52) (18 - 18)    Gen: NAD  Head: NC/AT  Cardio: S1S2+, RRR  Resp: CTABL, no wheezing  Abdomen: Soft, NT/ND, BS+  Extremities: No LE edema bilaterally    bedside sonogram: viable fetus, measuring 16.2 weeks, +movements,  bpm, posterior placenta, cervix appears closed/long transabdominally    (1 hour postprandial)    A:  35yo , EGA@17 weeks, type 2 DM, euglycemic, with c/o dizziness      Plan: CBC, CMP sent; case was reviewed with Dr Prieto, was advised to discharge patient home is lab results wnl.    addendum at 16:50: patient appears in no distress;                        12.9   13.38 )-----------( 295      ( 2022 16:03 )             39.1       137  |  103  |  8   ----------------------------<  84  3.8   |  22  |  0.61    Ca    9.2      2022 16:03    TPro  7.1  /  Alb  3.8  /  TBili  0.4  /  DBili  x   /  AST  16  /  ALT  11  /  AlkPhos  70    All of the above was reviewed with Dr Prieto, and Dr Molina, was advised to discharge patient with outpatient neurology follow up; patient would like to have an MRI today; was sent to ED, report given to ED resident in [facility].    Patient was encouraged to follow diabetic diet, increase PO fluid intake;  labor precautions reviewed; if relief of symptoms, to follow up with PMD within 3 days.  Prior notes, lab results (prenatal chart review, prenatal labs) and recommendations were reviewed;   All ordered tests results reviewed and interpreted.   Plan of care was reviewed with patient and family; patient states understanding of the above plan.  In total 35 minutes spent with established.    Em Kurtz CNM     22 @ 15:46

## 2022-04-25 NOTE — OB RN TRIAGE NOTE - HEART RATE (BEATS/MIN)
Action 1: Continue
Detail Level: Zone
Continue Regimen: Ilumya every 12 weeks and have labs done by PCP. She will fax results.
99

## 2022-04-25 NOTE — ED PROVIDER NOTE - PATIENT PORTAL LINK FT
You can access the FollowMyHealth Patient Portal offered by [facility] by registering at the following website: [email or web address]. By joining [facility]’s FollowMyHealth portal, you will also be able to view your health information using other applications (apps) compatible with our system.

## 2022-04-25 NOTE — ED ADULT TRIAGE NOTE - CHIEF COMPLAINT QUOTE
17 wks preg. was sent by OBGYN for dizziness and needs MRI, pt stated dizziness started on 4/20/22, + nausea at times vomiting, HA, denies blur vision.  FS=92 in triage. pt

## 2022-04-25 NOTE — ED PROVIDER NOTE - CLINICAL SUMMARY MEDICAL DECISION MAKING FREE TEXT BOX
pt p/w acute onset of room spinning dizziness with no room spinning dizziness. vss. ekg non concerning. hd stable with normal vitals. symptomatic improvement s/p d/c provided neuro f/u.   meclizine evaluated for safety with pregnancy. pt cleared by ob and sent down to ed for eval. eatting food in bed comfortably with no acute complaints.

## 2022-04-25 NOTE — ED PROVIDER NOTE - OBJECTIVE STATEMENT
37 y/o w/ hx of gestational dm, as well as DM currently, f  17 weeks preg p/w dizziness, worse with movt of head, paroxysmal in nature, acute and severe in nature with no unsteadiness, vision changes. pt states she was evaluated and cleared by ob for ed eval. pt denies any focal neuro deficits. states has had the symptoms persist for 3 weeks with worsening last week and today. admits to 2 episodes of vomiting and became concerned so presented to ed. states symptoms always worsen with movt of head.

## 2022-04-25 NOTE — OB RN TRIAGE NOTE - NS_ACCOMPAINEDBY_OBGYN_ALL_OB
Note Text (......Xxx Chief Complaint.): This diagnosis correlates with the Render Risk Assessment In Note?: no Other (Free Text): FBSE q6mos x 2 yrs, monitor for repigmentation. Detail Level: Detailed Spouse

## 2022-04-25 NOTE — OB PROVIDER TRIAGE NOTE - HISTORY OF PRESENT ILLNESS
Patient presented c/o nausea and vomiting (6 times) Patient presented c/o nausea and vomiting (6 times) on 4/20/2022, vomited once ever day since, today c/o feeling dizzy. Patient is able to tolerate food and fluid.

## 2022-04-25 NOTE — ED ADULT NURSE NOTE - NS ED NOTE ABUSE RESPONSE YN
Per patient on Insulin pump.  Elevated glucose of 415 with downtrend to 200s. AG in 20s. Check serum acetone, yet patient without symptoms of abd pain, nausea, emesis.   Endo consulted for recommendations of pump  Monitor FS  Hypoglycemia protocol Yes

## 2022-04-29 ENCOUNTER — APPOINTMENT (OUTPATIENT)
Dept: MATERNAL FETAL MEDICINE | Facility: CLINIC | Age: 37
End: 2022-04-29

## 2022-05-06 ENCOUNTER — NON-APPOINTMENT (OUTPATIENT)
Age: 37
End: 2022-05-06

## 2022-05-11 ENCOUNTER — APPOINTMENT (OUTPATIENT)
Dept: MATERNAL FETAL MEDICINE | Facility: CLINIC | Age: 37
End: 2022-05-11
Payer: MEDICAID

## 2022-05-11 ENCOUNTER — ASOB RESULT (OUTPATIENT)
Age: 37
End: 2022-05-11

## 2022-05-11 PROCEDURE — G0108 DIAB MANAGE TRN  PER INDIV: CPT | Mod: 95

## 2022-05-13 ENCOUNTER — OUTPATIENT (OUTPATIENT)
Dept: OUTPATIENT SERVICES | Age: 37
LOS: 1 days | Discharge: HOME | End: 2022-05-13

## 2022-05-13 DIAGNOSIS — Z98.890 OTHER SPECIFIED POSTPROCEDURAL STATES: Chronic | ICD-10-CM

## 2022-05-18 ENCOUNTER — APPOINTMENT (OUTPATIENT)
Dept: MATERNAL FETAL MEDICINE | Facility: CLINIC | Age: 37
End: 2022-05-18

## 2022-05-18 ENCOUNTER — APPOINTMENT (OUTPATIENT)
Dept: OBGYN | Facility: CLINIC | Age: 37
End: 2022-05-18
Payer: MEDICAID

## 2022-05-18 ENCOUNTER — APPOINTMENT (OUTPATIENT)
Dept: PEDIATRIC CARDIOLOGY | Facility: CLINIC | Age: 37
End: 2022-05-18
Payer: MEDICAID

## 2022-05-18 VITALS
WEIGHT: 190 LBS | BODY MASS INDEX: 32.44 KG/M2 | HEIGHT: 64 IN | DIASTOLIC BLOOD PRESSURE: 68 MMHG | SYSTOLIC BLOOD PRESSURE: 120 MMHG

## 2022-05-18 DIAGNOSIS — Z34.82 ENCOUNTER FOR SUPERVISION OF OTHER NORMAL PREGNANCY, SECOND TRIMESTER: ICD-10-CM

## 2022-05-18 PROCEDURE — 93325 DOPPLER ECHO COLOR FLOW MAPG: CPT | Mod: 59

## 2022-05-18 PROCEDURE — 99213 OFFICE O/P EST LOW 20 MIN: CPT | Mod: TH

## 2022-05-18 PROCEDURE — 99202 OFFICE O/P NEW SF 15 MIN: CPT | Mod: 25

## 2022-05-18 PROCEDURE — 76821 MIDDLE CEREBRAL ARTERY ECHO: CPT

## 2022-05-18 PROCEDURE — 76827 ECHO EXAM OF FETAL HEART: CPT

## 2022-05-18 PROCEDURE — 76825 ECHO EXAM OF FETAL HEART: CPT

## 2022-05-18 PROCEDURE — 76820 UMBILICAL ARTERY ECHO: CPT

## 2022-05-19 ENCOUNTER — ASOB RESULT (OUTPATIENT)
Age: 37
End: 2022-05-19

## 2022-05-19 ENCOUNTER — APPOINTMENT (OUTPATIENT)
Dept: ANTEPARTUM | Facility: CLINIC | Age: 37
End: 2022-05-19

## 2022-05-19 PROCEDURE — 76817 TRANSVAGINAL US OBSTETRIC: CPT | Mod: 59

## 2022-05-19 PROCEDURE — 76811 OB US DETAILED SNGL FETUS: CPT

## 2022-05-20 ENCOUNTER — APPOINTMENT (OUTPATIENT)
Dept: INTERNAL MEDICINE | Facility: CLINIC | Age: 37
End: 2022-05-20

## 2022-05-20 ENCOUNTER — NON-APPOINTMENT (OUTPATIENT)
Age: 37
End: 2022-05-20

## 2022-05-26 ENCOUNTER — ASOB RESULT (OUTPATIENT)
Age: 37
End: 2022-05-26

## 2022-05-26 ENCOUNTER — APPOINTMENT (OUTPATIENT)
Dept: MATERNAL FETAL MEDICINE | Facility: CLINIC | Age: 37
End: 2022-05-26
Payer: MEDICAID

## 2022-05-26 PROCEDURE — G0108 DIAB MANAGE TRN  PER INDIV: CPT | Mod: 95

## 2022-06-09 ENCOUNTER — APPOINTMENT (OUTPATIENT)
Dept: MATERNAL FETAL MEDICINE | Facility: CLINIC | Age: 37
End: 2022-06-09
Payer: MEDICAID

## 2022-06-09 ENCOUNTER — ASOB RESULT (OUTPATIENT)
Age: 37
End: 2022-06-09

## 2022-06-09 PROCEDURE — G0108 DIAB MANAGE TRN  PER INDIV: CPT | Mod: 95

## 2022-06-15 ENCOUNTER — APPOINTMENT (OUTPATIENT)
Dept: OBGYN | Facility: CLINIC | Age: 37
End: 2022-06-15
Payer: MEDICAID

## 2022-06-15 VITALS
HEIGHT: 64 IN | SYSTOLIC BLOOD PRESSURE: 120 MMHG | BODY MASS INDEX: 33.29 KG/M2 | WEIGHT: 195 LBS | DIASTOLIC BLOOD PRESSURE: 60 MMHG

## 2022-06-15 PROCEDURE — 99213 OFFICE O/P EST LOW 20 MIN: CPT | Mod: TH

## 2022-06-16 ENCOUNTER — APPOINTMENT (OUTPATIENT)
Dept: ANTEPARTUM | Facility: CLINIC | Age: 37
End: 2022-06-16
Payer: MEDICAID

## 2022-06-16 ENCOUNTER — ASOB RESULT (OUTPATIENT)
Age: 37
End: 2022-06-16

## 2022-06-16 LAB
ESTIMATED AVERAGE GLUCOSE: 105 MG/DL
HBA1C MFR BLD HPLC: 5.3 %

## 2022-06-16 PROCEDURE — 76816 OB US FOLLOW-UP PER FETUS: CPT

## 2022-06-23 ENCOUNTER — APPOINTMENT (OUTPATIENT)
Dept: MATERNAL FETAL MEDICINE | Facility: CLINIC | Age: 37
End: 2022-06-23

## 2022-06-30 ENCOUNTER — NON-APPOINTMENT (OUTPATIENT)
Age: 37
End: 2022-06-30

## 2022-07-05 ENCOUNTER — APPOINTMENT (OUTPATIENT)
Dept: NEUROLOGY | Facility: CLINIC | Age: 37
End: 2022-07-05

## 2022-07-06 ENCOUNTER — ASOB RESULT (OUTPATIENT)
Age: 37
End: 2022-07-06

## 2022-07-06 ENCOUNTER — APPOINTMENT (OUTPATIENT)
Dept: MATERNAL FETAL MEDICINE | Facility: CLINIC | Age: 37
End: 2022-07-06

## 2022-07-06 PROCEDURE — G0108 DIAB MANAGE TRN  PER INDIV: CPT | Mod: 95

## 2022-07-13 ENCOUNTER — APPOINTMENT (OUTPATIENT)
Dept: OBGYN | Facility: CLINIC | Age: 37
End: 2022-07-13

## 2022-07-13 VITALS
SYSTOLIC BLOOD PRESSURE: 114 MMHG | BODY MASS INDEX: 33.46 KG/M2 | HEIGHT: 64 IN | WEIGHT: 196 LBS | DIASTOLIC BLOOD PRESSURE: 70 MMHG

## 2022-07-13 PROCEDURE — 99213 OFFICE O/P EST LOW 20 MIN: CPT | Mod: TH

## 2022-07-14 ENCOUNTER — ASOB RESULT (OUTPATIENT)
Age: 37
End: 2022-07-14

## 2022-07-14 ENCOUNTER — APPOINTMENT (OUTPATIENT)
Dept: ANTEPARTUM | Facility: CLINIC | Age: 37
End: 2022-07-14

## 2022-07-14 PROCEDURE — 76816 OB US FOLLOW-UP PER FETUS: CPT

## 2022-07-14 RX ORDER — ASPIRIN ENTERIC COATED TABLETS 81 MG 81 MG/1
81 TABLET, DELAYED RELEASE ORAL DAILY
Qty: 60 | Refills: 6 | Status: ACTIVE | COMMUNITY
Start: 2022-06-15 | End: 1900-01-01

## 2022-07-14 NOTE — OB PROVIDER H&P - PROBLEM SELECTOR PROBLEM 1
Progress Note    Chief Complaint:  Chief Complaint   Patient presents with   • Vomiting   • Pain      Subjective:  Met with hospice today - working on discharge home     Current Medications:  Current Facility-Administered Medications   Medication Dose Route Frequency Provider Last Rate Last Admin   • morphine (ROXANOL) 100 MG/5ML concentrated solution 10 mg  10 mg Oral Q4H PRN Marin Matthew MD       • sodium chloride 0.9% infusion   Intravenous Continuous PRN Jade English MD       • fentaNYL (DURAGESIC) 50 MCG/HR patch 1 patch  1 patch Transdermal Q3 Days Kenzie Bush, CNP   1 patch at 07/11/22 0938   • bisacodyl (DULCOLAX) suppository 10 mg  10 mg Rectal Daily PRN Kenzie Bush, CNP   10 mg at 07/08/22 1522   • sodium chloride (PF) 0.9 % injection 10 mL  10 mL Injection 2 times per day Xi H Resendiz, DO   10 mL at 07/13/22 2047   • sodium chloride (PF) 0.9 % injection 20 mL  20 mL Injection PRN Xi H Resendiz, DO       • HYDROmorphone (DILAUDID) injection 0.5 mg  0.5 mg Intravenous Q3H PRN Xi H Resendiz, DO   0.5 mg at 07/13/22 2044   • prochlorperazine (COMPAZINE) injection 5 mg  5 mg Intravenous Q6H PRN Kenzie Bush, CNP   5 mg at 07/13/22 1809   • sodium chloride 0.9% infusion   Intravenous Continuous PRN Shraddha Giraldo, DO       • sodium chloride 0.9% infusion   Intravenous Continuous PRN Shraddha Giraldo DO       • sodium chloride 0.9 % flush bag 25 mL  25 mL Intravenous PRN Rosie Doe MD       • sodium chloride (PF) 0.9 % injection 2 mL  2 mL Intracatheter 2 times per day Rosie Doe MD   2 mL at 07/13/22 2047   • sodium chloride 0.9 % flush bag 25 mL  25 mL Intravenous PRN Shraddha Giraldo DO       • Potassium Standard Replacement Protocol   Does not apply See Admin Instructions Shraddha Giraldo DO       • Magnesium Standard Replacement Protocol   Does not apply See Admin Instructions Shraddha Giraldo DO       • ondansetron (ZOFRAN) injection 4 mg  4 mg Intravenous BID PRN  Shraddha Petersonanovic, DO   4 mg at 07/13/22 2044   • lidocaine (LIDOCARE) 4 % patch 1 patch  1 patch Transdermal Daily Shraddha Giraldo DO   1 patch at 07/13/22 0858   • hydrALAZINE (APRESOLINE) injection 10 mg  10 mg Intravenous Q6H PRN Shraddha Giraldo DO   10 mg at 07/06/22 0054     Facility-Administered Medications Ordered in Other Encounters   Medication Dose Route Frequency Provider Last Rate Last Admin   • HYDROcodone-acetaminophen (NORCO) 5-325 MG per tablet 1 tablet  1 tablet Oral Once Patricia TISHA Pinto MD         I/O:    Intake/Output Summary (Last 24 hours) at 7/13/2022 2149  Last data filed at 7/13/2022 1300  Gross per 24 hour   Intake 840 ml   Output 3200 ml   Net -2360 ml     Physical Exam  Temp:  [97.9 °F (36.6 °C)-98.1 °F (36.7 °C)] 97.9 °F (36.6 °C)  Heart Rate:  [86-92] 90  Resp:  [18] 18  BP: (107-130)/(71-81) 130/74  Physical Exam  Constitutional:       Appearance: She is ill-appearing.   Eyes:      Extraocular Movements: Extraocular movements intact.      Pupils: Pupils are equal, round, and reactive to light.   Cardiovascular:      Rate and Rhythm: Normal rate and regular rhythm.   Pulmonary:      Effort: No respiratory distress.      Breath sounds: Normal breath sounds.   Abdominal:      General: There is no distension.   Musculoskeletal:         General: No swelling.   Skin:     Findings: No rash.   Neurological:      General: No focal deficit present.      Cranial Nerves: No cranial nerve deficit.   Psychiatric:         Mood and Affect: Mood normal.       Labs  No results found for this or any previous visit (from the past 24 hour(s)).  Imaging  CT ABDOMEN PELVIS W CONTRAST - IV contrast only   Final Result       1.  5.7 cm heterogeneous mass at the level of the gastric antrum with   severe luminal narrowing and associated gastric outlet obstruction.  This   mass abuts or invades multiple adjacent abdominal structures as above.        2.  Additional 6.6 cm heterogeneous mass in the  gastrohepatic ligament   region, abutting the celiac artery and eroding into the medial wall of the   stomach.  There is likely intra-articular gastric extension with   ulceration.        3.  Multiple liver metastases.        4.  Omental and mesenteric carcinomatosis.  Serosal metastases are present   in the pelvis involving the sigmoid colon.  Additional mesenteric   carcinomatosis seen within the pelvis.        5.  4.1 cm serosal metastasis involving the ascending colon without   evidence of obstruction.        6.  Small amount of free fluid within the abdomen and pelvis.  No free air   or bowel obstruction.      7.  Mild left hydroureteronephrosis.  Mild enhancement involving the left   distal ureter may be secondary to metastatic disease either within the   ureter or adjacent to the ureter causing mass effect.      Electronically Signed by: HAFSA GARCIA M.D.    Signed on: 6/30/2022 5:59 PM          XR CHEST PA OR AP 1 VIEW   Final Result      1.  Mild atelectasis or scarring left lung base with small left pleural   effusion.      2.  No focal infiltrate or evidence of congestive failure.         Electronically Signed by: HAFSA GARCIA M.D.    Signed on: 6/30/2022 5:09 PM                Assessment and Plan  Lucia Ray is a 65 year old female who presented with gastric outlet obstruction due to metastatic pancreatic cancer     Gastric outlet obstruction due to metastatic pancreatic cancer  --currently undergoing radiation therapy, has completed 7/10 planned treatments  --attempted to clamp NG tube but patient did not tolerate  --now patient wants to stop radiation and enroll in hospice; had hospice meeting today and planning on discharge to home on hospice.   --oral intake as tolerated / desired      Metastatic pancreatic cancer  --with metastases to liver, omentum, gastric, intestine   --palliative care following  --continue fentanyl patch with IV dilaudid prn breakthrough pain; try roxanol today as she can  take this at home.      Acute on chronic macrocytic anemia  --discontinue IV protonix  --Hb with acute drop on 7/9 and transfused one unit PRBCs but suspect spurious result due to dilution; repeat CBC showed increase in all counts and higher than expected rise in Hb confirming this theory  --at this time do not suspect acute blood loss  --further laboratory monitoring not within goals of care     Thrombocytopenia  --likely related to malignancy  --further laboratory monitoring not within goals of care     Leukocytosis  --likely reactive due to above  --no clinical signs of infection  --further laboratory monitoring not within goals of care     Severe protein calorie malnutrition  Hypertension  Hypothyroidism  Hypokalemia     FEN/Prophy  --pleasure feeds as tolerated      Dispo: ADOD to home with hospice when arrangements made.    Code Status:    Code Status Information     Code Status    Do Not Resuscitate    Modified Resuscitation Specifics:    Patient is DNR: Yes     Intubation: No     Antiarrhythmics: No     Cardioversion: No     Vasopressor: No          Marin Matthew MD  7/13/2022     Labor and delivery complications, antepartum

## 2022-07-15 ENCOUNTER — APPOINTMENT (OUTPATIENT)
Dept: MATERNAL FETAL MEDICINE | Facility: CLINIC | Age: 37
End: 2022-07-15

## 2022-07-15 ENCOUNTER — ASOB RESULT (OUTPATIENT)
Age: 37
End: 2022-07-15

## 2022-07-15 PROCEDURE — G0108 DIAB MANAGE TRN  PER INDIV: CPT | Mod: 95

## 2022-07-15 RX ORDER — ELECTROLYTES/DEXTROSE
32G X 4 MM SOLUTION, ORAL ORAL
Qty: 2 | Refills: 3 | Status: ACTIVE | COMMUNITY
Start: 2021-01-04 | End: 1900-01-01

## 2022-07-26 RX ORDER — ALCOHOL ANTISEPTIC PADS
PADS, MEDICATED (EA) TOPICAL
Qty: 1 | Refills: 3 | Status: ACTIVE | COMMUNITY
Start: 2020-12-14 | End: 1900-01-01

## 2022-07-27 ENCOUNTER — APPOINTMENT (OUTPATIENT)
Dept: OBGYN | Facility: CLINIC | Age: 37
End: 2022-07-27

## 2022-07-27 VITALS
BODY MASS INDEX: 34.83 KG/M2 | SYSTOLIC BLOOD PRESSURE: 112 MMHG | HEIGHT: 64 IN | WEIGHT: 204 LBS | DIASTOLIC BLOOD PRESSURE: 64 MMHG

## 2022-07-27 DIAGNOSIS — Z34.83 ENCOUNTER FOR SUPERVISION OF OTHER NORMAL PREGNANCY, THIRD TRIMESTER: ICD-10-CM

## 2022-07-27 PROCEDURE — 99213 OFFICE O/P EST LOW 20 MIN: CPT | Mod: TH

## 2022-07-29 ENCOUNTER — NON-APPOINTMENT (OUTPATIENT)
Age: 37
End: 2022-07-29

## 2022-08-05 ENCOUNTER — ASOB RESULT (OUTPATIENT)
Age: 37
End: 2022-08-05

## 2022-08-05 ENCOUNTER — APPOINTMENT (OUTPATIENT)
Dept: MATERNAL FETAL MEDICINE | Facility: CLINIC | Age: 37
End: 2022-08-05

## 2022-08-05 PROCEDURE — G0108 DIAB MANAGE TRN  PER INDIV: CPT | Mod: 95

## 2022-08-08 ENCOUNTER — APPOINTMENT (OUTPATIENT)
Dept: OBGYN | Facility: CLINIC | Age: 37
End: 2022-08-08

## 2022-08-08 VITALS
WEIGHT: 204 LBS | SYSTOLIC BLOOD PRESSURE: 124 MMHG | BODY MASS INDEX: 34.83 KG/M2 | DIASTOLIC BLOOD PRESSURE: 72 MMHG | HEIGHT: 64 IN

## 2022-08-08 DIAGNOSIS — Z23 ENCOUNTER FOR IMMUNIZATION: ICD-10-CM

## 2022-08-08 PROCEDURE — 99213 OFFICE O/P EST LOW 20 MIN: CPT | Mod: TH

## 2022-08-08 RX ORDER — LABETALOL HYDROCHLORIDE 100 MG/1
100 TABLET, FILM COATED ORAL
Qty: 60 | Refills: 0 | Status: ACTIVE | COMMUNITY
Start: 2022-04-20

## 2022-08-08 RX ORDER — FLUTICASONE PROPIONATE AND SALMETEROL 250; 50 UG/1; UG/1
250-50 POWDER RESPIRATORY (INHALATION) TWICE DAILY
Qty: 1 | Refills: 1 | Status: ACTIVE | COMMUNITY
Start: 2022-08-08 | End: 1900-01-01

## 2022-08-08 RX ORDER — ONDANSETRON 4 MG/1
4 TABLET ORAL
Qty: 10 | Refills: 0 | Status: ACTIVE | COMMUNITY
Start: 2022-04-21

## 2022-08-08 RX ORDER — ALBUTEROL SULFATE 90 UG/1
108 (90 BASE) INHALANT RESPIRATORY (INHALATION) EVERY 6 HOURS
Qty: 1 | Refills: 4 | Status: ACTIVE | COMMUNITY
Start: 2022-08-08 | End: 1900-01-01

## 2022-08-11 ENCOUNTER — APPOINTMENT (OUTPATIENT)
Dept: ANTEPARTUM | Facility: CLINIC | Age: 37
End: 2022-08-11

## 2022-08-11 ENCOUNTER — ASOB RESULT (OUTPATIENT)
Age: 37
End: 2022-08-11

## 2022-08-11 PROCEDURE — 76816 OB US FOLLOW-UP PER FETUS: CPT

## 2022-08-11 PROCEDURE — 76820 UMBILICAL ARTERY ECHO: CPT

## 2022-08-11 PROCEDURE — 76819 FETAL BIOPHYS PROFIL W/O NST: CPT

## 2022-08-12 ENCOUNTER — ASOB RESULT (OUTPATIENT)
Age: 37
End: 2022-08-12

## 2022-08-12 ENCOUNTER — APPOINTMENT (OUTPATIENT)
Dept: MATERNAL FETAL MEDICINE | Facility: CLINIC | Age: 37
End: 2022-08-12

## 2022-08-12 PROCEDURE — G0108 DIAB MANAGE TRN  PER INDIV: CPT | Mod: 95

## 2022-08-12 RX ORDER — METFORMIN HYDROCHLORIDE 500 MG/1
500 TABLET, COATED ORAL
Qty: 1 | Refills: 3 | Status: ACTIVE | COMMUNITY
Start: 2022-08-12 | End: 1900-01-01

## 2022-08-12 RX ORDER — INSULIN HUMAN 100 [IU]/ML
100 INJECTION, SUSPENSION SUBCUTANEOUS
Qty: 1 | Refills: 3 | Status: ACTIVE | COMMUNITY
Start: 2022-03-18 | End: 1900-01-01

## 2022-08-19 ENCOUNTER — ASOB RESULT (OUTPATIENT)
Age: 37
End: 2022-08-19

## 2022-08-19 ENCOUNTER — APPOINTMENT (OUTPATIENT)
Dept: MATERNAL FETAL MEDICINE | Facility: CLINIC | Age: 37
End: 2022-08-19

## 2022-08-19 PROCEDURE — G0108 DIAB MANAGE TRN  PER INDIV: CPT | Mod: 95

## 2022-08-23 ENCOUNTER — APPOINTMENT (OUTPATIENT)
Dept: OBGYN | Facility: CLINIC | Age: 37
End: 2022-08-23

## 2022-08-23 VITALS
DIASTOLIC BLOOD PRESSURE: 70 MMHG | WEIGHT: 206 LBS | HEIGHT: 64 IN | SYSTOLIC BLOOD PRESSURE: 118 MMHG | BODY MASS INDEX: 35.17 KG/M2

## 2022-08-23 LAB
BILIRUB UR QL STRIP: NORMAL
GLUCOSE UR-MCNC: NORMAL
HCG UR QL: 0.2 EU/DL
HGB UR QL STRIP.AUTO: NORMAL
KETONES UR-MCNC: NORMAL
LEUKOCYTE ESTERASE UR QL STRIP: NORMAL
NITRITE UR QL STRIP: NORMAL
PH UR STRIP: 6
PROT UR STRIP-MCNC: NORMAL
SP GR UR STRIP: 1.02

## 2022-08-23 PROCEDURE — 99213 OFFICE O/P EST LOW 20 MIN: CPT | Mod: TH

## 2022-08-25 ENCOUNTER — APPOINTMENT (OUTPATIENT)
Dept: ANTEPARTUM | Facility: CLINIC | Age: 37
End: 2022-08-25

## 2022-08-25 ENCOUNTER — ASOB RESULT (OUTPATIENT)
Age: 37
End: 2022-08-25

## 2022-08-25 PROCEDURE — 76818 FETAL BIOPHYS PROFILE W/NST: CPT

## 2022-08-25 PROCEDURE — 76820 UMBILICAL ARTERY ECHO: CPT

## 2022-08-25 PROCEDURE — ZZZZZ: CPT

## 2022-08-30 ENCOUNTER — APPOINTMENT (OUTPATIENT)
Dept: MATERNAL FETAL MEDICINE | Facility: CLINIC | Age: 37
End: 2022-08-30

## 2022-08-30 ENCOUNTER — ASOB RESULT (OUTPATIENT)
Age: 37
End: 2022-08-30

## 2022-08-30 PROCEDURE — G0108 DIAB MANAGE TRN  PER INDIV: CPT | Mod: 95

## 2022-09-01 ENCOUNTER — APPOINTMENT (OUTPATIENT)
Dept: ANTEPARTUM | Facility: CLINIC | Age: 37
End: 2022-09-01

## 2022-09-01 ENCOUNTER — ASOB RESULT (OUTPATIENT)
Age: 37
End: 2022-09-01

## 2022-09-01 PROCEDURE — ZZZZZ: CPT

## 2022-09-01 PROCEDURE — 76820 UMBILICAL ARTERY ECHO: CPT

## 2022-09-01 PROCEDURE — 76818 FETAL BIOPHYS PROFILE W/NST: CPT

## 2022-09-06 ENCOUNTER — APPOINTMENT (OUTPATIENT)
Dept: OBGYN | Facility: CLINIC | Age: 37
End: 2022-09-06

## 2022-09-06 VITALS
BODY MASS INDEX: 35.85 KG/M2 | HEIGHT: 64 IN | DIASTOLIC BLOOD PRESSURE: 72 MMHG | WEIGHT: 210 LBS | SYSTOLIC BLOOD PRESSURE: 132 MMHG

## 2022-09-06 DIAGNOSIS — K59.00 CONSTIPATION, UNSPECIFIED: ICD-10-CM

## 2022-09-06 PROCEDURE — 99213 OFFICE O/P EST LOW 20 MIN: CPT | Mod: TH

## 2022-09-07 LAB
BASOPHILS # BLD AUTO: 0.05 K/UL
BASOPHILS NFR BLD AUTO: 0.5 %
BILIRUB UR QL STRIP: NORMAL
EOSINOPHIL # BLD AUTO: 0.19 K/UL
EOSINOPHIL NFR BLD AUTO: 2.1 %
GLUCOSE UR-MCNC: 100
HCG UR QL: 0.2 EU/DL
HCT VFR BLD CALC: 39.4 %
HGB BLD-MCNC: 13 G/DL
HGB UR QL STRIP.AUTO: NORMAL
HIV1+2 AB SPEC QL IA.RAPID: NONREACTIVE
IMM GRANULOCYTES NFR BLD AUTO: 1.5 %
KETONES UR-MCNC: NORMAL
LEUKOCYTE ESTERASE UR QL STRIP: NORMAL
LYMPHOCYTES # BLD AUTO: 1.65 K/UL
LYMPHOCYTES NFR BLD AUTO: 18.1 %
MAN DIFF?: NORMAL
MCHC RBC-ENTMCNC: 30.6 PG
MCHC RBC-ENTMCNC: 33 GM/DL
MCV RBC AUTO: 92.7 FL
MONOCYTES # BLD AUTO: 0.6 K/UL
MONOCYTES NFR BLD AUTO: 6.6 %
NEUTROPHILS # BLD AUTO: 6.5 K/UL
NEUTROPHILS NFR BLD AUTO: 71.2 %
NITRITE UR QL STRIP: NORMAL
PH UR STRIP: 6
PLATELET # BLD AUTO: 189 K/UL
PROT UR STRIP-MCNC: NORMAL
RBC # BLD: 4.25 M/UL
RBC # FLD: 14.4 %
SP GR UR STRIP: 1.01
WBC # FLD AUTO: 9.13 K/UL

## 2022-09-08 ENCOUNTER — NON-APPOINTMENT (OUTPATIENT)
Age: 37
End: 2022-09-08

## 2022-09-08 ENCOUNTER — APPOINTMENT (OUTPATIENT)
Dept: ANTEPARTUM | Facility: CLINIC | Age: 37
End: 2022-09-08

## 2022-09-08 ENCOUNTER — ASOB RESULT (OUTPATIENT)
Age: 37
End: 2022-09-08

## 2022-09-08 LAB
GP B STREP DNA SPEC QL NAA+PROBE: DETECTED
GP B STREP DNA SPEC QL NAA+PROBE: NORMAL
SOURCE GBS: NORMAL

## 2022-09-08 PROCEDURE — 76820 UMBILICAL ARTERY ECHO: CPT | Mod: 59

## 2022-09-08 PROCEDURE — 76816 OB US FOLLOW-UP PER FETUS: CPT

## 2022-09-08 PROCEDURE — ZZZZZ: CPT

## 2022-09-08 PROCEDURE — 76818 FETAL BIOPHYS PROFILE W/NST: CPT | Mod: 59

## 2022-09-09 ENCOUNTER — APPOINTMENT (OUTPATIENT)
Dept: MATERNAL FETAL MEDICINE | Facility: CLINIC | Age: 37
End: 2022-09-09

## 2022-09-09 ENCOUNTER — ASOB RESULT (OUTPATIENT)
Age: 37
End: 2022-09-09

## 2022-09-09 PROCEDURE — G0108 DIAB MANAGE TRN  PER INDIV: CPT | Mod: 95

## 2022-09-12 ENCOUNTER — APPOINTMENT (OUTPATIENT)
Dept: ANTEPARTUM | Facility: CLINIC | Age: 37
End: 2022-09-12

## 2022-09-12 ENCOUNTER — ASOB RESULT (OUTPATIENT)
Age: 37
End: 2022-09-12

## 2022-09-12 PROCEDURE — 76818 FETAL BIOPHYS PROFILE W/NST: CPT

## 2022-09-12 PROCEDURE — ZZZZZ: CPT

## 2022-09-13 ENCOUNTER — APPOINTMENT (OUTPATIENT)
Dept: OBGYN | Facility: CLINIC | Age: 37
End: 2022-09-13

## 2022-09-13 VITALS
DIASTOLIC BLOOD PRESSURE: 70 MMHG | SYSTOLIC BLOOD PRESSURE: 120 MMHG | BODY MASS INDEX: 36.37 KG/M2 | WEIGHT: 213 LBS | HEIGHT: 64 IN

## 2022-09-13 PROCEDURE — 99213 OFFICE O/P EST LOW 20 MIN: CPT | Mod: TH

## 2022-09-15 ENCOUNTER — APPOINTMENT (OUTPATIENT)
Dept: ANTEPARTUM | Facility: CLINIC | Age: 37
End: 2022-09-15

## 2022-09-15 ENCOUNTER — ASOB RESULT (OUTPATIENT)
Age: 37
End: 2022-09-15

## 2022-09-15 PROCEDURE — 76820 UMBILICAL ARTERY ECHO: CPT | Mod: 59

## 2022-09-15 PROCEDURE — 76818 FETAL BIOPHYS PROFILE W/NST: CPT

## 2022-09-16 ENCOUNTER — NON-APPOINTMENT (OUTPATIENT)
Age: 37
End: 2022-09-16

## 2022-09-16 ENCOUNTER — APPOINTMENT (OUTPATIENT)
Dept: MATERNAL FETAL MEDICINE | Facility: CLINIC | Age: 37
End: 2022-09-16

## 2022-09-16 ENCOUNTER — ASOB RESULT (OUTPATIENT)
Age: 37
End: 2022-09-16

## 2022-09-16 PROCEDURE — G0108 DIAB MANAGE TRN  PER INDIV: CPT | Mod: 95

## 2022-09-19 ENCOUNTER — APPOINTMENT (OUTPATIENT)
Dept: ANTEPARTUM | Facility: CLINIC | Age: 37
End: 2022-09-19

## 2022-09-19 ENCOUNTER — ASOB RESULT (OUTPATIENT)
Age: 37
End: 2022-09-19

## 2022-09-19 PROCEDURE — 76818 FETAL BIOPHYS PROFILE W/NST: CPT

## 2022-09-19 PROCEDURE — ZZZZZ: CPT

## 2022-09-20 ENCOUNTER — APPOINTMENT (OUTPATIENT)
Dept: OBGYN | Facility: CLINIC | Age: 37
End: 2022-09-20

## 2022-09-20 VITALS
HEIGHT: 64 IN | SYSTOLIC BLOOD PRESSURE: 126 MMHG | WEIGHT: 214 LBS | BODY MASS INDEX: 36.54 KG/M2 | DIASTOLIC BLOOD PRESSURE: 70 MMHG

## 2022-09-20 DIAGNOSIS — O09.529 SUPERVISION OF ELDERLY MULTIGRAVIDA, UNSPECIFIED TRIMESTER: ICD-10-CM

## 2022-09-20 PROCEDURE — 99213 OFFICE O/P EST LOW 20 MIN: CPT | Mod: TH

## 2022-09-21 ENCOUNTER — NON-APPOINTMENT (OUTPATIENT)
Age: 37
End: 2022-09-21

## 2022-09-21 DIAGNOSIS — Z01.818 ENCOUNTER FOR OTHER PREPROCEDURAL EXAMINATION: ICD-10-CM

## 2022-09-22 ENCOUNTER — APPOINTMENT (OUTPATIENT)
Dept: ANTEPARTUM | Facility: CLINIC | Age: 37
End: 2022-09-22

## 2022-09-22 ENCOUNTER — ASOB RESULT (OUTPATIENT)
Age: 37
End: 2022-09-22

## 2022-09-22 PROCEDURE — 76820 UMBILICAL ARTERY ECHO: CPT | Mod: 59

## 2022-09-22 PROCEDURE — 76818 FETAL BIOPHYS PROFILE W/NST: CPT

## 2022-09-23 ENCOUNTER — APPOINTMENT (OUTPATIENT)
Dept: MATERNAL FETAL MEDICINE | Facility: CLINIC | Age: 37
End: 2022-09-23

## 2022-09-23 ENCOUNTER — ASOB RESULT (OUTPATIENT)
Age: 37
End: 2022-09-23

## 2022-09-23 PROCEDURE — G0108 DIAB MANAGE TRN  PER INDIV: CPT | Mod: 95

## 2022-09-26 ENCOUNTER — ASOB RESULT (OUTPATIENT)
Age: 37
End: 2022-09-26

## 2022-09-26 ENCOUNTER — APPOINTMENT (OUTPATIENT)
Dept: ANTEPARTUM | Facility: CLINIC | Age: 37
End: 2022-09-26

## 2022-09-26 PROCEDURE — 76818 FETAL BIOPHYS PROFILE W/NST: CPT

## 2022-09-26 PROCEDURE — ZZZZZ: CPT

## 2022-09-26 PROCEDURE — 76820 UMBILICAL ARTERY ECHO: CPT | Mod: 59

## 2022-09-27 ENCOUNTER — APPOINTMENT (OUTPATIENT)
Dept: OBGYN | Facility: CLINIC | Age: 37
End: 2022-09-27

## 2022-09-27 LAB — SARS-COV-2 N GENE NPH QL NAA+PROBE: NOT DETECTED

## 2022-09-28 ENCOUNTER — TRANSCRIPTION ENCOUNTER (OUTPATIENT)
Age: 37
End: 2022-09-28

## 2022-09-28 ENCOUNTER — INPATIENT (INPATIENT)
Facility: HOSPITAL | Age: 37
LOS: 1 days | Discharge: HOME | End: 2022-09-30
Attending: OBSTETRICS & GYNECOLOGY | Admitting: OBSTETRICS & GYNECOLOGY

## 2022-09-28 ENCOUNTER — RESULT REVIEW (OUTPATIENT)
Age: 37
End: 2022-09-28

## 2022-09-28 VITALS
RESPIRATION RATE: 18 BRPM | HEART RATE: 92 BPM | WEIGHT: 216.05 LBS | TEMPERATURE: 98 F | HEIGHT: 64 IN | DIASTOLIC BLOOD PRESSURE: 79 MMHG | SYSTOLIC BLOOD PRESSURE: 130 MMHG

## 2022-09-28 DIAGNOSIS — O24.410 GESTATIONAL DIABETES MELLITUS IN PREGNANCY, DIET CONTROLLED: ICD-10-CM

## 2022-09-28 DIAGNOSIS — Z98.890 OTHER SPECIFIED POSTPROCEDURAL STATES: Chronic | ICD-10-CM

## 2022-09-28 LAB
BASOPHILS # BLD AUTO: 0.05 K/UL — SIGNIFICANT CHANGE UP (ref 0–0.2)
BASOPHILS NFR BLD AUTO: 0.5 % — SIGNIFICANT CHANGE UP (ref 0–2)
BLD GP AB SCN SERPL QL: NEGATIVE — SIGNIFICANT CHANGE UP
COVID-19 SPIKE DOMAIN AB INTERP: POSITIVE
COVID-19 SPIKE DOMAIN ANTIBODY RESULT: >250 U/ML — HIGH
EOSINOPHIL # BLD AUTO: 0.16 K/UL — SIGNIFICANT CHANGE UP (ref 0–0.5)
EOSINOPHIL NFR BLD AUTO: 1.6 % — SIGNIFICANT CHANGE UP (ref 0–6)
GLUCOSE BLDC GLUCOMTR-MCNC: 65 MG/DL — LOW (ref 70–99)
GLUCOSE BLDC GLUCOMTR-MCNC: 67 MG/DL — LOW (ref 70–99)
GLUCOSE BLDC GLUCOMTR-MCNC: 74 MG/DL — SIGNIFICANT CHANGE UP (ref 70–99)
GLUCOSE BLDC GLUCOMTR-MCNC: 89 MG/DL — SIGNIFICANT CHANGE UP (ref 70–99)
GLUCOSE BLDC GLUCOMTR-MCNC: 90 MG/DL — SIGNIFICANT CHANGE UP (ref 70–99)
GLUCOSE BLDC GLUCOMTR-MCNC: 97 MG/DL — SIGNIFICANT CHANGE UP (ref 70–99)
HCT VFR BLD CALC: 37 % — SIGNIFICANT CHANGE UP (ref 34.5–45)
HGB BLD-MCNC: 12.5 G/DL — SIGNIFICANT CHANGE UP (ref 11.5–15.5)
IANC: 7.32 K/UL — SIGNIFICANT CHANGE UP (ref 1.8–7.4)
IMM GRANULOCYTES NFR BLD AUTO: 0.6 % — SIGNIFICANT CHANGE UP (ref 0–0.9)
LYMPHOCYTES # BLD AUTO: 1.87 K/UL — SIGNIFICANT CHANGE UP (ref 1–3.3)
LYMPHOCYTES # BLD AUTO: 18.5 % — SIGNIFICANT CHANGE UP (ref 13–44)
MCHC RBC-ENTMCNC: 30.6 PG — SIGNIFICANT CHANGE UP (ref 27–34)
MCHC RBC-ENTMCNC: 33.8 GM/DL — SIGNIFICANT CHANGE UP (ref 32–36)
MCV RBC AUTO: 90.7 FL — SIGNIFICANT CHANGE UP (ref 80–100)
MONOCYTES # BLD AUTO: 0.64 K/UL — SIGNIFICANT CHANGE UP (ref 0–0.9)
MONOCYTES NFR BLD AUTO: 6.3 % — SIGNIFICANT CHANGE UP (ref 2–14)
NEUTROPHILS # BLD AUTO: 7.32 K/UL — SIGNIFICANT CHANGE UP (ref 1.8–7.4)
NEUTROPHILS NFR BLD AUTO: 72.5 % — SIGNIFICANT CHANGE UP (ref 43–77)
NRBC # BLD: 0 /100 WBCS — SIGNIFICANT CHANGE UP (ref 0–0)
NRBC # FLD: 0 K/UL — SIGNIFICANT CHANGE UP (ref 0–0)
PLATELET # BLD AUTO: 197 K/UL — SIGNIFICANT CHANGE UP (ref 150–400)
RBC # BLD: 4.08 M/UL — SIGNIFICANT CHANGE UP (ref 3.8–5.2)
RBC # FLD: 14.2 % — SIGNIFICANT CHANGE UP (ref 10.3–14.5)
RH IG SCN BLD-IMP: POSITIVE — SIGNIFICANT CHANGE UP
SARS-COV-2 IGG+IGM SERPL QL IA: >250 U/ML — HIGH
SARS-COV-2 IGG+IGM SERPL QL IA: POSITIVE
T PALLIDUM AB TITR SER: NEGATIVE — SIGNIFICANT CHANGE UP
WBC # BLD: 10.1 K/UL — SIGNIFICANT CHANGE UP (ref 3.8–10.5)
WBC # FLD AUTO: 10.1 K/UL — SIGNIFICANT CHANGE UP (ref 3.8–10.5)

## 2022-09-28 PROCEDURE — 59514 CESAREAN DELIVERY ONLY: CPT | Mod: U7

## 2022-09-28 RX ORDER — HEPARIN SODIUM 5000 [USP'U]/ML
5000 INJECTION INTRAVENOUS; SUBCUTANEOUS EVERY 12 HOURS
Refills: 0 | Status: DISCONTINUED | OUTPATIENT
Start: 2022-09-29 | End: 2022-09-30

## 2022-09-28 RX ORDER — ONDANSETRON 8 MG/1
4 TABLET, FILM COATED ORAL EVERY 6 HOURS
Refills: 0 | Status: DISCONTINUED | OUTPATIENT
Start: 2022-09-29 | End: 2022-09-30

## 2022-09-28 RX ORDER — AMPICILLIN TRIHYDRATE 250 MG
1 CAPSULE ORAL EVERY 4 HOURS
Refills: 0 | Status: DISCONTINUED | OUTPATIENT
Start: 2022-09-28 | End: 2022-09-28

## 2022-09-28 RX ORDER — ALBUTEROL 90 UG/1
2 AEROSOL, METERED ORAL EVERY 6 HOURS
Refills: 0 | Status: DISCONTINUED | OUTPATIENT
Start: 2022-09-28 | End: 2022-09-28

## 2022-09-28 RX ORDER — DEXAMETHASONE 0.5 MG/5ML
4 ELIXIR ORAL EVERY 6 HOURS
Refills: 0 | Status: DISCONTINUED | OUTPATIENT
Start: 2022-09-29 | End: 2022-09-30

## 2022-09-28 RX ORDER — DIPHENHYDRAMINE HCL 50 MG
25 CAPSULE ORAL EVERY 6 HOURS
Refills: 0 | Status: COMPLETED | OUTPATIENT
Start: 2022-09-29 | End: 2023-08-28

## 2022-09-28 RX ORDER — SODIUM CHLORIDE 9 MG/ML
1000 INJECTION, SOLUTION INTRAVENOUS
Refills: 0 | Status: DISCONTINUED | OUTPATIENT
Start: 2022-09-29 | End: 2022-09-30

## 2022-09-28 RX ORDER — OXYCODONE HYDROCHLORIDE 5 MG/1
5 TABLET ORAL ONCE
Refills: 0 | Status: DISCONTINUED | OUTPATIENT
Start: 2022-09-29 | End: 2022-09-29

## 2022-09-28 RX ORDER — MECLIZINE HCL 12.5 MG
25 TABLET ORAL DAILY
Refills: 0 | Status: DISCONTINUED | OUTPATIENT
Start: 2022-09-28 | End: 2022-09-28

## 2022-09-28 RX ORDER — CHLORHEXIDINE GLUCONATE 213 G/1000ML
1 SOLUTION TOPICAL ONCE
Refills: 0 | Status: DISCONTINUED | OUTPATIENT
Start: 2022-09-28 | End: 2022-09-28

## 2022-09-28 RX ORDER — OXYCODONE HYDROCHLORIDE 5 MG/1
5 TABLET ORAL ONCE
Refills: 0 | Status: DISCONTINUED | OUTPATIENT
Start: 2022-09-29 | End: 2022-09-30

## 2022-09-28 RX ORDER — IBUPROFEN 200 MG
600 TABLET ORAL EVERY 6 HOURS
Refills: 0 | Status: COMPLETED | OUTPATIENT
Start: 2022-09-29 | End: 2023-08-28

## 2022-09-28 RX ORDER — ACETAMINOPHEN 500 MG
975 TABLET ORAL
Refills: 0 | Status: DISCONTINUED | OUTPATIENT
Start: 2022-09-29 | End: 2022-09-30

## 2022-09-28 RX ORDER — IBUPROFEN 200 MG
600 TABLET ORAL EVERY 6 HOURS
Refills: 0 | Status: DISCONTINUED | OUTPATIENT
Start: 2022-09-29 | End: 2022-09-29

## 2022-09-28 RX ORDER — OXYCODONE HYDROCHLORIDE 5 MG/1
5 TABLET ORAL
Refills: 0 | Status: DISCONTINUED | OUTPATIENT
Start: 2022-09-29 | End: 2022-09-29

## 2022-09-28 RX ORDER — INFLUENZA VIRUS VACCINE 15; 15; 15; 15 UG/.5ML; UG/.5ML; UG/.5ML; UG/.5ML
0.5 SUSPENSION INTRAMUSCULAR ONCE
Refills: 0 | Status: COMPLETED | OUTPATIENT
Start: 2022-09-28 | End: 2022-09-28

## 2022-09-28 RX ORDER — SODIUM CHLORIDE 9 MG/ML
1000 INJECTION, SOLUTION INTRAVENOUS
Refills: 0 | Status: DISCONTINUED | OUTPATIENT
Start: 2022-09-28 | End: 2022-09-28

## 2022-09-28 RX ORDER — TETANUS TOXOID, REDUCED DIPHTHERIA TOXOID AND ACELLULAR PERTUSSIS VACCINE, ADSORBED 5; 2.5; 8; 8; 2.5 [IU]/.5ML; [IU]/.5ML; UG/.5ML; UG/.5ML; UG/.5ML
0.5 SUSPENSION INTRAMUSCULAR ONCE
Refills: 0 | Status: DISCONTINUED | OUTPATIENT
Start: 2022-09-29 | End: 2022-09-30

## 2022-09-28 RX ORDER — OXYTOCIN 10 UNIT/ML
2 VIAL (ML) INJECTION
Qty: 30 | Refills: 0 | Status: DISCONTINUED | OUTPATIENT
Start: 2022-09-28 | End: 2022-09-28

## 2022-09-28 RX ORDER — LANOLIN
1 OINTMENT (GRAM) TOPICAL EVERY 6 HOURS
Refills: 0 | Status: DISCONTINUED | OUTPATIENT
Start: 2022-09-29 | End: 2022-09-30

## 2022-09-28 RX ORDER — SODIUM CHLORIDE 9 MG/ML
1000 INJECTION INTRAMUSCULAR; INTRAVENOUS; SUBCUTANEOUS
Refills: 0 | Status: DISCONTINUED | OUTPATIENT
Start: 2022-09-28 | End: 2022-09-28

## 2022-09-28 RX ORDER — AMPICILLIN TRIHYDRATE 250 MG
2 CAPSULE ORAL ONCE
Refills: 0 | Status: COMPLETED | OUTPATIENT
Start: 2022-09-28 | End: 2022-09-28

## 2022-09-28 RX ORDER — MAGNESIUM HYDROXIDE 400 MG/1
30 TABLET, CHEWABLE ORAL
Refills: 0 | Status: DISCONTINUED | OUTPATIENT
Start: 2022-09-29 | End: 2022-09-30

## 2022-09-28 RX ORDER — NALOXONE HYDROCHLORIDE 4 MG/.1ML
0.1 SPRAY NASAL
Refills: 0 | Status: DISCONTINUED | OUTPATIENT
Start: 2022-09-29 | End: 2022-09-30

## 2022-09-28 RX ORDER — KETOROLAC TROMETHAMINE 30 MG/ML
30 SYRINGE (ML) INJECTION EVERY 6 HOURS
Refills: 0 | Status: DISCONTINUED | OUTPATIENT
Start: 2022-09-29 | End: 2022-09-30

## 2022-09-28 RX ORDER — DIPHENHYDRAMINE HCL 50 MG
25 CAPSULE ORAL EVERY 6 HOURS
Refills: 0 | Status: DISCONTINUED | OUTPATIENT
Start: 2022-09-29 | End: 2022-09-29

## 2022-09-28 RX ORDER — TETANUS TOXOID, REDUCED DIPHTHERIA TOXOID AND ACELLULAR PERTUSSIS VACCINE, ADSORBED 5; 2.5; 8; 8; 2.5 [IU]/.5ML; [IU]/.5ML; UG/.5ML; UG/.5ML; UG/.5ML
0.5 SUSPENSION INTRAMUSCULAR ONCE
Refills: 0 | Status: DISCONTINUED | OUTPATIENT
Start: 2022-09-29 | End: 2022-09-29

## 2022-09-28 RX ORDER — ONDANSETRON 8 MG/1
4 TABLET, FILM COATED ORAL ONCE
Refills: 0 | Status: COMPLETED | OUTPATIENT
Start: 2022-09-28 | End: 2022-09-28

## 2022-09-28 RX ORDER — OXYTOCIN 10 UNIT/ML
333.33 VIAL (ML) INJECTION
Qty: 20 | Refills: 0 | Status: DISCONTINUED | OUTPATIENT
Start: 2022-09-28 | End: 2022-09-29

## 2022-09-28 RX ORDER — CITRIC ACID/SODIUM CITRATE 300-500 MG
15 SOLUTION, ORAL ORAL EVERY 6 HOURS
Refills: 0 | Status: DISCONTINUED | OUTPATIENT
Start: 2022-09-28 | End: 2022-09-28

## 2022-09-28 RX ORDER — SIMETHICONE 80 MG/1
80 TABLET, CHEWABLE ORAL EVERY 4 HOURS
Refills: 0 | Status: DISCONTINUED | OUTPATIENT
Start: 2022-09-29 | End: 2022-09-30

## 2022-09-28 RX ORDER — OXYCODONE HYDROCHLORIDE 5 MG/1
5 TABLET ORAL
Refills: 0 | Status: COMPLETED | OUTPATIENT
Start: 2022-09-29 | End: 2022-10-06

## 2022-09-28 RX ADMIN — Medication 216 GRAM(S): at 03:35

## 2022-09-28 RX ADMIN — ONDANSETRON 4 MILLIGRAM(S): 8 TABLET, FILM COATED ORAL at 10:48

## 2022-09-28 RX ADMIN — Medication 108 GRAM(S): at 12:28

## 2022-09-28 RX ADMIN — Medication 108 GRAM(S): at 07:57

## 2022-09-28 RX ADMIN — Medication 2 MILLIUNIT(S)/MIN: at 10:47

## 2022-09-28 RX ADMIN — Medication 25 MILLIGRAM(S): at 12:28

## 2022-09-28 RX ADMIN — Medication 108 GRAM(S): at 16:36

## 2022-09-28 RX ADMIN — Medication 108 GRAM(S): at 20:30

## 2022-09-28 NOTE — OB RN DELIVERY SUMMARY - NSSELHIDDEN_OBGYN_ALL_OB_FT
[NS_[email or web address]_ALL_OB_FT:[id number]],[NS_DeliveryAssist1_OBGYN_ALL_OB_FT:[id number]],[NS_DeliveryRN_OBGYN_ALL_OB_FT:[id number]]

## 2022-09-28 NOTE — OB PROVIDER H&P - HISTORY OF PRESENT ILLNESS
37yo  at 39w2d presents for IOL for T2DM. Patient denies chest pain, shortness of breath, fevers, chills, nausea, vomiting, diarrhea.  FM+ LOF- VB- Cx- EFW 3370g (MFM sono: 2773g on )    PNC: T2DM, patient states her sugars have been well control, fasting 80-90s    OBHx:  7#10,  6#14 (A2), uncomplicated deliveries  GYNHx: denies fibroids, cysts, abnormal paps, STDs  PMH: T2DM diagnosed after delivery in  (A2 in preg)  PSH: D&C x2  Meds: PNVs. bASA. Admelog 40u qAC, Humulin 60u qHS, Metformin 500mg qD, meclizine  All: NKDA, shrimp (rash, throat itchiness)  Soc: no substance use, anxiety/depression    accepts blood   37yo  at 39w2d presents for IOL for T2DM. Patient denies chest pain, shortness of breath, fevers, chills, nausea, vomiting, diarrhea.  FM+ LOF- VB- Cx- EFW 3370g (MFM sono: 2773g on ) GBS positive.    PNC: T2DM, patient states her sugars have been well control, fasting 80-90s    OBHx:  7#10,  6#14 (A2), uncomplicated deliveries  GYNHx: denies fibroids, cysts, abnormal paps, STDs  PMH: T2DM diagnosed after delivery in  (A2 in preg)  PSH: D&C x2  Meds: PNVs. bASA. Admelog 40u qAC, Humulin 60u qHS, Metformin 500mg qD, meclizine  All: NKDA, shrimp (rash, throat itchiness)  Soc: no substance use, anxiety/depression    accepts blood

## 2022-09-28 NOTE — OB RN PATIENT PROFILE - INFANT HOME WITH MOTHER, OB PROFILE
Please make an appointment to follow up with [facility] (phone: [phone]) in 2-3 days.      Return to emergency department if you develop uncontrollable nosebleed.  Also return if you have any further concerns     yes

## 2022-09-28 NOTE — OB RN INTRAOPERATIVE NOTE - NSSELHIDDEN_OBGYN_ALL_OB_FT
[NS_DeliveryAttending1_OBGYN_ALL_OB_FT:[id number]],[NS_DeliveryAssist1_OBGYN_ALL_OB_FT:[id number]],[NS_DeliveryRN_OBGYN_ALL_OB_FT:[id number]]

## 2022-09-28 NOTE — OB PROVIDER H&P - ASSESSMENT
35yo  at 39w2d presents for IOL for T2DM.    - Admit to L&D  - IVF/routine labs  - Ampicillin for GBS positive status  - T2DM: NPO, q4hr FS in latent labor, q2hr in active labor, alternating fluids  - VE 2-360/-3, Plan: PO cytotec.    Amyeo Afroz Jereen, PGY-2  d/w Dr Khan

## 2022-09-28 NOTE — OB RN DELIVERY SUMMARY - NS_SEPSISRSKCALC_OBGYN_ALL_OB_FT
EOS calculated successfully. EOS Risk Factor: 0.5/1000 live births ([facility] national incidence); GA=39w2d; Temp=98.24; ROM=9; GBS='Positive'; Antibiotics='GBS specific antibiotics > 2 hrs prior to birth'

## 2022-09-28 NOTE — OB RN PATIENT PROFILE - NS_GBS_INFANT_INVASIVE_OBGYN_ALL_OB_FT
PCP progress note from 2/22/2022 received. Detailed note sent to scanning.    Patient states no history

## 2022-09-28 NOTE — OB PROVIDER H&P - NSHPPHYSICALEXAM_GEN_ALL_CORE
Gen: NAD  Cards: RRR  Pulm: CTAB  Abd: soft, non-tender, gravid  Ext: warm, well perfused    FHT     Sono: cephalic, LOP Gen: NAD  Cards: RRR  Pulm: CTAB  Abd: soft, non-tender, gravid  Ext: warm, well perfused    /mod/+accels no decels    Sono: cephalic, LOP

## 2022-09-28 NOTE — OB RN INTRAOPERATIVE NOTE - NS_INTERMITTENTPNEUMATICCOMPRESSIONSTART_OBGYN_ALL_OB_DT
What Type Of Note Output Would You Prefer (Optional)?: Standard Output How Severe Is Your Rash?: moderate Is This A New Presentation, Or A Follow-Up?: Rash 29-Sep-2022 22:40

## 2022-09-28 NOTE — OB PROVIDER LABOR PROGRESS NOTE - NS_SUBJECTIVE/OBJECTIVE_OBGYN_ALL_OB_FT
patient examined for cervical change, currently on pitocin 20 units  On examination patient noted to be breech presentation, with anus palpated on exam.  Sonogram confirmed beata breech presentation  discussed risks of vaginal breech delivery with patient and recommendations for primary  sections.  Risks and benefits of  section discussed with patient.  Consents signed. Pitocin stopped. Anesthesia and charge nurse notified.    ZABRINA Andrade, PGY-4  d/w Dr. Chamberlain
pt seen and examined at bedside

## 2022-09-28 NOTE — OB PROVIDER LABOR PROGRESS NOTE - ASSESSMENT
35 yo  at 39/2 weeks  - plan for pitocin   - plan for epidural then AROM  - cont EFM/toco    d/w Dr Sruthi gale PA-C

## 2022-09-29 ENCOUNTER — APPOINTMENT (OUTPATIENT)
Dept: ANTEPARTUM | Facility: CLINIC | Age: 37
End: 2022-09-29

## 2022-09-29 LAB
A1C WITH ESTIMATED AVERAGE GLUCOSE RESULT: 6.2 % — HIGH (ref 4–5.6)
ESTIMATED AVERAGE GLUCOSE: 131 — SIGNIFICANT CHANGE UP
GLUCOSE BLDC GLUCOMTR-MCNC: 144 MG/DL — HIGH (ref 70–99)
GLUCOSE BLDC GLUCOMTR-MCNC: 161 MG/DL — HIGH (ref 70–99)
GLUCOSE BLDC GLUCOMTR-MCNC: 163 MG/DL — HIGH (ref 70–99)
GLUCOSE BLDC GLUCOMTR-MCNC: 164 MG/DL — HIGH (ref 70–99)
GLUCOSE BLDC GLUCOMTR-MCNC: 189 MG/DL — HIGH (ref 70–99)
GLUCOSE BLDC GLUCOMTR-MCNC: 202 MG/DL — HIGH (ref 70–99)
GLUCOSE BLDC GLUCOMTR-MCNC: 204 MG/DL — HIGH (ref 70–99)
GLUCOSE BLDC GLUCOMTR-MCNC: 205 MG/DL — HIGH (ref 70–99)
HCT VFR BLD CALC: 31.7 % — LOW (ref 34.5–45)
HGB BLD-MCNC: 10.6 G/DL — LOW (ref 11.5–15.5)
MCHC RBC-ENTMCNC: 30.4 PG — SIGNIFICANT CHANGE UP (ref 27–34)
MCHC RBC-ENTMCNC: 33.4 GM/DL — SIGNIFICANT CHANGE UP (ref 32–36)
MCV RBC AUTO: 90.8 FL — SIGNIFICANT CHANGE UP (ref 80–100)
NRBC # BLD: 0 /100 WBCS — SIGNIFICANT CHANGE UP (ref 0–0)
NRBC # FLD: 0 K/UL — SIGNIFICANT CHANGE UP (ref 0–0)
PLATELET # BLD AUTO: 186 K/UL — SIGNIFICANT CHANGE UP (ref 150–400)
RBC # BLD: 3.49 M/UL — LOW (ref 3.8–5.2)
RBC # FLD: 14.3 % — SIGNIFICANT CHANGE UP (ref 10.3–14.5)
WBC # BLD: 17.43 K/UL — HIGH (ref 3.8–10.5)
WBC # FLD AUTO: 17.43 K/UL — HIGH (ref 3.8–10.5)

## 2022-09-29 RX ORDER — ACETAMINOPHEN 500 MG
975 TABLET ORAL
Refills: 0 | Status: DISCONTINUED | OUTPATIENT
Start: 2022-09-29 | End: 2022-09-29

## 2022-09-29 RX ORDER — INSULIN LISPRO 100/ML
VIAL (ML) SUBCUTANEOUS AT BEDTIME
Refills: 0 | Status: DISCONTINUED | OUTPATIENT
Start: 2022-09-29 | End: 2022-09-30

## 2022-09-29 RX ORDER — NALBUPHINE HYDROCHLORIDE 10 MG/ML
2.5 INJECTION, SOLUTION INTRAMUSCULAR; INTRAVENOUS; SUBCUTANEOUS EVERY 6 HOURS
Refills: 0 | Status: DISCONTINUED | OUTPATIENT
Start: 2022-09-29 | End: 2022-09-30

## 2022-09-29 RX ORDER — DEXTROSE 50 % IN WATER 50 %
25 SYRINGE (ML) INTRAVENOUS ONCE
Refills: 0 | Status: DISCONTINUED | OUTPATIENT
Start: 2022-09-29 | End: 2022-09-30

## 2022-09-29 RX ORDER — OXYTOCIN 10 UNIT/ML
333.33 VIAL (ML) INJECTION
Qty: 20 | Refills: 0 | Status: DISCONTINUED | OUTPATIENT
Start: 2022-09-29 | End: 2022-09-29

## 2022-09-29 RX ORDER — DEXTROSE 50 % IN WATER 50 %
12.5 SYRINGE (ML) INTRAVENOUS ONCE
Refills: 0 | Status: DISCONTINUED | OUTPATIENT
Start: 2022-09-29 | End: 2022-09-30

## 2022-09-29 RX ORDER — MECLIZINE HCL 12.5 MG
12.5 TABLET ORAL ONCE
Refills: 0 | Status: COMPLETED | OUTPATIENT
Start: 2022-09-29 | End: 2022-09-29

## 2022-09-29 RX ORDER — KETOROLAC TROMETHAMINE 30 MG/ML
30 SYRINGE (ML) INJECTION EVERY 6 HOURS
Refills: 0 | Status: DISCONTINUED | OUTPATIENT
Start: 2022-09-29 | End: 2022-09-29

## 2022-09-29 RX ORDER — MECLIZINE HCL 12.5 MG
25 TABLET ORAL THREE TIMES A DAY
Refills: 0 | Status: DISCONTINUED | OUTPATIENT
Start: 2022-09-29 | End: 2022-09-30

## 2022-09-29 RX ORDER — OXYCODONE HYDROCHLORIDE 5 MG/1
5 TABLET ORAL
Refills: 0 | Status: DISCONTINUED | OUTPATIENT
Start: 2022-09-29 | End: 2022-09-30

## 2022-09-29 RX ORDER — DEXTROSE 50 % IN WATER 50 %
15 SYRINGE (ML) INTRAVENOUS ONCE
Refills: 0 | Status: DISCONTINUED | OUTPATIENT
Start: 2022-09-29 | End: 2022-09-30

## 2022-09-29 RX ORDER — MECLIZINE HCL 12.5 MG
12.5 TABLET ORAL DAILY
Refills: 0 | Status: DISCONTINUED | OUTPATIENT
Start: 2022-09-29 | End: 2022-09-29

## 2022-09-29 RX ORDER — INSULIN LISPRO 100/ML
VIAL (ML) SUBCUTANEOUS
Refills: 0 | Status: DISCONTINUED | OUTPATIENT
Start: 2022-09-29 | End: 2022-09-30

## 2022-09-29 RX ORDER — SODIUM CHLORIDE 9 MG/ML
1000 INJECTION, SOLUTION INTRAVENOUS
Refills: 0 | Status: DISCONTINUED | OUTPATIENT
Start: 2022-09-29 | End: 2022-09-30

## 2022-09-29 RX ORDER — MAGNESIUM HYDROXIDE 400 MG/1
30 TABLET, CHEWABLE ORAL
Refills: 0 | Status: DISCONTINUED | OUTPATIENT
Start: 2022-09-29 | End: 2022-09-29

## 2022-09-29 RX ORDER — GLUCAGON INJECTION, SOLUTION 0.5 MG/.1ML
1 INJECTION, SOLUTION SUBCUTANEOUS ONCE
Refills: 0 | Status: DISCONTINUED | OUTPATIENT
Start: 2022-09-29 | End: 2022-09-30

## 2022-09-29 RX ORDER — ALBUTEROL 90 UG/1
2 AEROSOL, METERED ORAL EVERY 6 HOURS
Refills: 0 | Status: DISCONTINUED | OUTPATIENT
Start: 2022-09-29 | End: 2022-09-30

## 2022-09-29 RX ORDER — DIPHENHYDRAMINE HCL 50 MG
25 CAPSULE ORAL EVERY 6 HOURS
Refills: 0 | Status: DISCONTINUED | OUTPATIENT
Start: 2022-09-29 | End: 2022-09-30

## 2022-09-29 RX ORDER — LANOLIN
1 OINTMENT (GRAM) TOPICAL EVERY 6 HOURS
Refills: 0 | Status: DISCONTINUED | OUTPATIENT
Start: 2022-09-29 | End: 2022-09-29

## 2022-09-29 RX ORDER — SIMETHICONE 80 MG/1
80 TABLET, CHEWABLE ORAL EVERY 4 HOURS
Refills: 0 | Status: DISCONTINUED | OUTPATIENT
Start: 2022-09-29 | End: 2022-09-29

## 2022-09-29 RX ADMIN — Medication 1: at 17:45

## 2022-09-29 RX ADMIN — Medication 975 MILLIGRAM(S): at 20:18

## 2022-09-29 RX ADMIN — Medication 2: at 07:59

## 2022-09-29 RX ADMIN — Medication 975 MILLIGRAM(S): at 20:50

## 2022-09-29 RX ADMIN — Medication 2: at 11:52

## 2022-09-29 RX ADMIN — Medication 975 MILLIGRAM(S): at 09:11

## 2022-09-29 RX ADMIN — NALBUPHINE HYDROCHLORIDE 2.5 MILLIGRAM(S): 10 INJECTION, SOLUTION INTRAMUSCULAR; INTRAVENOUS; SUBCUTANEOUS at 08:36

## 2022-09-29 RX ADMIN — Medication 30 MILLIGRAM(S): at 14:00

## 2022-09-29 RX ADMIN — Medication 30 MILLIGRAM(S): at 17:44

## 2022-09-29 RX ADMIN — Medication 25 MILLIGRAM(S): at 20:56

## 2022-09-29 RX ADMIN — Medication 12.5 MILLIGRAM(S): at 09:10

## 2022-09-29 RX ADMIN — Medication 30 MILLIGRAM(S): at 17:59

## 2022-09-29 RX ADMIN — HEPARIN SODIUM 5000 UNIT(S): 5000 INJECTION INTRAVENOUS; SUBCUTANEOUS at 17:45

## 2022-09-29 RX ADMIN — Medication 30 MILLIGRAM(S): at 13:24

## 2022-09-29 RX ADMIN — Medication 975 MILLIGRAM(S): at 08:35

## 2022-09-29 RX ADMIN — HEPARIN SODIUM 5000 UNIT(S): 5000 INJECTION INTRAVENOUS; SUBCUTANEOUS at 05:26

## 2022-09-29 RX ADMIN — Medication 12.5 MILLIGRAM(S): at 09:21

## 2022-09-29 RX ADMIN — Medication 30 MILLIGRAM(S): at 06:04

## 2022-09-29 RX ADMIN — Medication 30 MILLIGRAM(S): at 05:25

## 2022-09-29 NOTE — DISCHARGE NOTE OB - CARE PLAN
Principal Discharge DX:	 delivery delivered  Assessment and plan of treatment:	Nothing per vagina.  No tub soaking or heavy lifting.   1

## 2022-09-29 NOTE — DISCHARGE NOTE OB - MEDICATION SUMMARY - MEDICATIONS TO STOP TAKING
I will STOP taking the medications listed below when I get home from the hospital:    aspirin 81 mg oral tablet  -- orally once a day    Admelog 100 units/mL injectable solution  -- 20 unit(s) injectable 3 times a day    HumuLIN 70/30 KwikPen 70 units-30 units/mL subcutaneous suspension  -- 26 unit(s) subcutaneous once a day (at bedtime)

## 2022-09-29 NOTE — OB PROVIDER DELIVERY SUMMARY - NSPROVIDERDELIVERYNOTE_OBGYN_ALL_OB_FT
Single intrauterine pregnancy @39w+2d for pLTCS for breech presentation  Liveborn infant apgars 9/9  Grossly normal adnexa b/l    IVF: 1500  UP:115    Hysterotomy closed in single layer with caprosyn suture Single intrauterine pregnancy @39w+2d for pLTCS for breech presentation  Liveborn infant apgars 9/9  Grossly normal adnexa b/l    QBL:680  IVF: 1500  UP:115    Hysterotomy closed in single layer with caprosyn suture Single intrauterine pregnancy @39w+2d for pLTCS for breech presentation  Liveborn infant apgars 9/9  Grossly normal adnexa b/l    QBL:680  IVF: 1500  UP:115    Hysterotomy closed in single layer with caprosyn suture    Dictation #[id number]

## 2022-09-29 NOTE — PROGRESS NOTE ADULT - ASSESSMENT
A/P: 35yo POD#1 s/p pLTCS for breech malpresentation. Course c/b h/o T2DM, Asthma, Vertigo. Patient is stable and doing well post-operatively.      #T2DM  - Continue premeal and bedtime finger sticks  - Continue LDISS for mealtime and bedtime  - Home doses: Admelog 40u qAC, Humulin 60u QHS, Metformin 500mg daily  - Will consult Endocrine for formal discharge medication recommendations    #Asthma  - Continue Albuterol inhaler prn    #Vertigo  - Cont home dose: Meclizine 25mg up to three times per day as needed for dizziness    #PP care  - Continue regular diet.  - Increase ambulation.  - Continue motrin, tylenol, oxycodone PRN for pain control  - Incision dressing removed 9/29  - Due to void 6-8hrs after davis removal  - Hct: 37->31.7    José Zamora MD PGY1 A/P: 35yo POD#1 s/p pLTCS for breech malpresentation. Course c/b h/o T2DM, Asthma, Vertigo. Patient is stable and doing well post-operatively.      #T2DM  - Continue premeal and bedtime finger sticks  - Continue LDISS for mealtime and bedtime  - Home doses: Admelog 40u qAC, Humulin 60u QHS, Metformin 500mg daily  - Will consult Endocrine for formal discharge medication recommendations    #Asthma  - Continue Albuterol inhaler prn    #Vertigo  - Cont home dose: Meclizine 25mg up to three times per day as needed for dizziness    #PP care  - Continue regular diet.  - Increase ambulation.  - Continue motrin, tylenol, oxycodone PRN for pain control  - Incision dressing removed 9/29  - Due to void 6-8hrs after davis removal  - Hct: 37->31.7    José Zamora MD PGY1  '  Attending:   Pt seen and examined approximately 2pm  Agree with above

## 2022-09-29 NOTE — DISCHARGE NOTE OB - MEDICATION SUMMARY - MEDICATIONS TO TAKE
I will START or STAY ON the medications listed below when I get home from the hospital:    ibuprofen 600 mg oral tablet  -- 1 tab(s) by mouth every 6 hours  -- Indication: For  delivery delivered    acetaminophen 500 mg oral tablet  -- 2 tab(s) by mouth every 6 hours  -- Indication: For  delivery delivered    Prenatal Multivitamins with Folic Acid 1 mg oral tablet  -- 1 tab(s) by mouth once a day  -- Indication: For  delivery delivered

## 2022-09-29 NOTE — DISCHARGE NOTE OB - OTHER SIGNIFICANT FINDINGS
The patient presented for a scheduled IOL. She delivered a live female on 9/28 for breech presentation.

## 2022-09-29 NOTE — PROVIDER CONTACT NOTE (OTHER) - BACKGROUND
C/S 9/28/22 @ 2304.  @ 39.2 weeks qbl 680. history DM type 2.

## 2022-09-29 NOTE — OB NEONATOLOGY/PEDIATRICIAN DELIVERY SUMMARY - NSPEDSNEONOTESA_OBGYN_ALL_OB_FT
39.2wk female born via breech CS to a 38y/o  blood type O+ mother. Maternal history of DM2 and asthma. Prenatal history of breech presentation. PNL -/-/NR/I, GBS + on , amp x5. AROM at 14:04 on  with light meconium fluids. Peds called for breech  and meconium. Birth events: meconium, IDM. Baby emerged vigorous, crying, was w/d/s/s with APGARS of 99. Mom plans to initiate breastfeeding, consents Hep B vaccine.  EOS 0.06.  Highest maternal temp 36.8.    BW: 3020g  : 22  TOB: 23:04    Physical Exam:  Gen: NAD, +grimace  HEENT: anterior fontanel open soft and flat, no cleft lip/palate, ears normal set, no ear pits or tags. no lesions in mouth/throat, nares clinically patent  Resp: no increased work of breathing, good air entry b/l, clear to auscultation bilaterally  Cardio: Normal S1/S2, regular rate and rhythm, no murmurs, rubs or gallops  Abd: soft, non tender, non distended, + bowel sounds, umbilical cord with 3 vessels  Neuro: +grasp/suck/justyna, normal tone  Extremities: negative orourke and ortolani, moving all extremities, full range of motion x 4, no crepitus  Skin: pink, warm  Genitals: +prominent L labia minora, Cabrera 1, anus patent

## 2022-09-29 NOTE — PROVIDER CONTACT NOTE (OTHER) - RECOMMENDATIONS
MD to Enter fingerstick and sliding scale orders STAT. check glucose STAT and treat per order.
RN provided education to patient about the importance of obtaining a fasting glucose level before glucose testing. Informed patient not to eat before the dinner glucose testing at 5pm

## 2022-09-29 NOTE — PROVIDER CONTACT NOTE (OTHER) - REASON
Patient states that she feels "dizzy and not herself"
Consistent high glucose levels
pt c/o feeling lightheaded

## 2022-09-29 NOTE — CHART NOTE - NSCHARTNOTEFT_GEN_A_CORE
*Delayed charting due to clinical responsibilities*    I personally called patient's  to confirm home dose of Meclizine that patient takes for vertigo.  found bottle at home and reports dose to be Meclizine 25mg up to three times per day as needed for dizziness. Patient was given a one time does of Meclizine 12.5mg prior to confirmation of home dose. When eligible for next dose, if patient requires, she will resume her home dose of 25mg.    José Zamora PGY1
10:00 am    pt seen at bedside for possible AROM  fetus not engaged in pelvis. rupture of membranes not preformed at this time. will reassess for AROM  plan for pitocin    /dw DR Sruthi gale PA-C
Pt assessed by MD Andrade for cervical change.  Fetus found to be breech presentation, confirmed by sonogram.  Discussed findings with patient.  As patient is ruptured and 4cm dilated, will proceed with PCS.  Written consent obtained.    SHARI Chamberlain MD
AN  Patient seen   induction for gma insulin  cytotec  for epidural then arom and pitocin  M Traugott
PA Note    seen & examined for cont of management  comfortable    VS  T(C): 36.7 (09-28-22 @ 10:54)  HR: 86 (09-28-22 @ 14:15)  BP: 147/90 (09-28-22 @ 14:15)  RR: 15 (09-28-22 @ 10:54)  SpO2: 100% (09-28-22 @ 14:11)    140/mod iván/+accels/no decels  Tonsina q 3-4min  4/70/-3 AROM clear    cont efm/toco  cont pitocin  dw Dr Sruthi witt

## 2022-09-29 NOTE — DISCHARGE NOTE OB - NS MD DC FALL RISK RISK
For information on Fall & Injury Prevention, visit: [email or web address] OR  [email or web address] OR  https://www.cdc.gov/steadi/patient.html

## 2022-09-29 NOTE — PROVIDER CONTACT NOTE (OTHER) - ACTION/TREATMENT ORDERED:
notified Dr. Alberto of pt feeling lightheaded. Called  to confirm meclizine dosage of 25mg. Pt received 12.5mg. MD will order another 12.5 dose. Standing dose will be 25mg daily going forward.
Notified MD about glucose levels were not fasting levels. Gave Patient 2 units of insulin. Will continue to monitor
notified Dr. Zamora of pt feeling lightheaded and her self-checked glucose result of 214. notified MD pt needs fingerstick and sliding scale orders. as per MD treat blood glucose level as pre meal

## 2022-09-29 NOTE — DISCHARGE NOTE OB - MATERIALS PROVIDED
Vaccinations/[facility]  Screening Program/  Immunization Record/Breastfeeding Log/Bottle Feeding Log/Breastfeeding Mother’s Support Group Information/Guide to Postpartum Care/[facility] Hearing Screen Program/Back To Sleep Handout/Shaken Baby Prevention Handout/Breastfeeding Guide and Packet/Birth Certificate Instructions

## 2022-09-29 NOTE — DISCHARGE NOTE OB - PATIENT PORTAL LINK FT
You can access the FollowMyHealth Patient Portal offered by [facility] by registering at the following website: [email or web address]. By joining [employer]s FollowMyHealth portal, you will also be able to view your health information using other applications (apps) compatible with our system.

## 2022-09-29 NOTE — OB PROVIDER DELIVERY SUMMARY - NSSELHIDDEN_OBGYN_ALL_OB_FT
[NS_DeliveryAttending1_OBGYN_ALL_OB_FT:[id number]],[NS_DeliveryAssist1_OBGYN_ALL_OB_FT:[id number]],[NS_DeliveryRN_OBGYN_ALL_OB_FT:[id number]=]

## 2022-09-29 NOTE — PROVIDER CONTACT NOTE (OTHER) - SITUATION
pt c/o feeling lightheaded. eating soup. pt checked her blood glucose with her own machine with a result of 214. Dr. LEVON Zamora made aware.
Patient's 7am glucose level was 202 and lunchtime glucose level was 204. Patient states that she ate soup and sandwich before we were able to do a glucose testing.
pt c/o feeling lightheaded. Pt states that she usually feels like this when her sugar in high and she has a hx of vertigo. We repeated her glucose stick and it was 189

## 2022-09-30 VITALS
DIASTOLIC BLOOD PRESSURE: 64 MMHG | SYSTOLIC BLOOD PRESSURE: 115 MMHG | OXYGEN SATURATION: 100 % | HEART RATE: 88 BPM | RESPIRATION RATE: 18 BRPM | TEMPERATURE: 98 F

## 2022-09-30 LAB
GLUCOSE BLDC GLUCOMTR-MCNC: 134 MG/DL — HIGH (ref 70–99)
GLUCOSE BLDC GLUCOMTR-MCNC: 138 MG/DL — HIGH (ref 70–99)

## 2022-09-30 RX ORDER — FERROUS SULFATE 325(65) MG
325 TABLET ORAL
Refills: 0 | Status: DISCONTINUED | OUTPATIENT
Start: 2022-09-30 | End: 2022-09-30

## 2022-09-30 RX ORDER — IBUPROFEN 200 MG
600 TABLET ORAL EVERY 6 HOURS
Refills: 0 | Status: DISCONTINUED | OUTPATIENT
Start: 2022-09-30 | End: 2022-09-30

## 2022-09-30 RX ORDER — ASPIRIN/CALCIUM CARB/MAGNESIUM 324 MG
0 TABLET ORAL
Qty: 0 | Refills: 0 | DISCHARGE

## 2022-09-30 RX ORDER — MECLIZINE HCL 12.5 MG
25 TABLET ORAL THREE TIMES A DAY
Refills: 0 | Status: DISCONTINUED | OUTPATIENT
Start: 2022-09-30 | End: 2022-09-30

## 2022-09-30 RX ORDER — ACETAMINOPHEN 500 MG
2 TABLET ORAL
Qty: 0 | Refills: 0 | DISCHARGE
Start: 2022-09-30

## 2022-09-30 RX ORDER — INSULIN LISPRO 100/ML
20 VIAL (ML) SUBCUTANEOUS
Qty: 0 | Refills: 0 | DISCHARGE

## 2022-09-30 RX ORDER — SENNA PLUS 8.6 MG/1
1 TABLET ORAL
Refills: 0 | Status: DISCONTINUED | OUTPATIENT
Start: 2022-09-30 | End: 2022-09-30

## 2022-09-30 RX ORDER — ASCORBIC ACID 60 MG
500 TABLET,CHEWABLE ORAL DAILY
Refills: 0 | Status: DISCONTINUED | OUTPATIENT
Start: 2022-09-30 | End: 2022-09-30

## 2022-09-30 RX ORDER — INSULIN NPH HUM/REG INSULIN HM 70-30/ML
26 VIAL (ML) SUBCUTANEOUS
Qty: 0 | Refills: 0 | DISCHARGE

## 2022-09-30 RX ORDER — IBUPROFEN 200 MG
1 TABLET ORAL
Qty: 0 | Refills: 0 | DISCHARGE
Start: 2022-09-30

## 2022-09-30 RX ADMIN — Medication 600 MILLIGRAM(S): at 11:37

## 2022-09-30 RX ADMIN — Medication 30 MILLIGRAM(S): at 01:00

## 2022-09-30 RX ADMIN — Medication 600 MILLIGRAM(S): at 12:05

## 2022-09-30 RX ADMIN — Medication 1 TABLET(S): at 11:36

## 2022-09-30 RX ADMIN — Medication 975 MILLIGRAM(S): at 08:39

## 2022-09-30 RX ADMIN — Medication 600 MILLIGRAM(S): at 05:34

## 2022-09-30 RX ADMIN — HEPARIN SODIUM 5000 UNIT(S): 5000 INJECTION INTRAVENOUS; SUBCUTANEOUS at 05:35

## 2022-09-30 RX ADMIN — Medication 30 MILLIGRAM(S): at 00:36

## 2022-09-30 RX ADMIN — Medication 600 MILLIGRAM(S): at 06:00

## 2022-09-30 RX ADMIN — Medication 975 MILLIGRAM(S): at 09:10

## 2022-09-30 RX ADMIN — SIMETHICONE 80 MILLIGRAM(S): 80 TABLET, CHEWABLE ORAL at 11:36

## 2022-09-30 RX ADMIN — Medication 500 MILLIGRAM(S): at 11:36

## 2022-09-30 RX ADMIN — SENNA PLUS 1 TABLET(S): 8.6 TABLET ORAL at 05:35

## 2022-09-30 RX ADMIN — Medication 25 MILLIGRAM(S): at 08:45

## 2022-09-30 RX ADMIN — Medication 325 MILLIGRAM(S): at 05:35

## 2022-09-30 RX ADMIN — MAGNESIUM HYDROXIDE 30 MILLILITER(S): 400 TABLET, CHEWABLE ORAL at 11:36

## 2022-09-30 NOTE — PROGRESS NOTE ADULT - ASSESSMENT
A/P: 35yo POD#2 s/p pLTCS for breech malpresentation. Course c/b h/o T2DM, Asthma, Vertigo. Patient is stable and doing well post-operatively.      #T2DM  - Continue premeal and bedtime finger sticks  - Continue LDISS for mealtime and bedtime  - Home doses: Admelog 40u qAC, Humulin 60u QHS, Metformin 500mg daily  - Will consult Endocrine today 9/30 for formal discharge medication recommendations    #Asthma  - Continue Albuterol inhaler prn    #Vertigo  - Cont home dose: Meclizine 25mg up to three times per day as needed for dizziness    #PP care  - Continue regular diet.  - Increase ambulation.  - Continue motrin, tylenol, oxycodone PRN for pain control  - Incision dressing removed 9/29  - Hct: 37->31.7  - Discharge planning per private attending    José Zamora MD PGY1

## 2022-09-30 NOTE — PROGRESS NOTE ADULT - SUBJECTIVE AND OBJECTIVE BOX
S: Patient doing well. No complaints. Minimal lochia. Pain controlled.    O: Vital Signs Last 24 Hrs  T(C): 36.8 (30 Sep 2022 10:05), Max: 36.8 (30 Sep 2022 10:05)  T(F): 98.2 (30 Sep 2022 10:05), Max: 98.2 (30 Sep 2022 10:05)  HR: 89 (30 Sep 2022 10:05) (76 - 89)  BP: 124/72 (30 Sep 2022 10:05) (114/73 - 132/65)  BP(mean): --  RR: 18 (30 Sep 2022 10:05) (17 - 18)  SpO2: 100% (30 Sep 2022 10:05) (95% - 100%)    Parameters below as of 30 Sep 2022 10:05  Patient On (Oxygen Delivery Method): room air        Gen: NAD  Abd: soft, Nontender, Nondistended, fundus firm  Ext: no tendern, mild edema    Labs:                        10.6   17.43 )-----------( 186      ( 29 Sep 2022 09:00 )             31.7       A: 36y POD#2 s/p c/s doing well.    Plan:  Routine postpartum care  Encouraged out of bed  Regular diet
Postop Day  1  s/p   C- Section    THERAPY:  [ X] Spinal morphine         Sedation Score:	  [ X] Alert	    [  ] Drowsy        [  ] Arousable	[  ] Asleep	[  ] Unresponsive    Side Effects:	  [ ] None	     [  ] Nausea        [ x ] Pruritus        [  ] Weakness   [  ] Numbness      Reports some intermittent dizziness but reports history of vertigo. No headache. Still with davis.    ASSESSMENT/ PLAN   Change to po prn pain meds 24 hours post Spinal Morphine.  [ x ]Documentation and Verification of current medications   
OB Progress Note:  Delivery, POD#1    S: 37yo POD#1 s/p pLTCS for breech malpresentation. Course c/b h/o T2DM, Asthma, Vertigo. Her pain is well controlled. She is tolerating a regular diet and passing flatus. Denies N/V. Denies CP/SOB/lightheadedness/dizziness.   She is ambulating without difficulty.   Pedroza catheter still in place. She is feeling itchy from anesthesia, anesthesia at bedside to evaluate her and will be ordering Nubaine.    O:   Vital Signs Last 24 Hrs  T(C): 36.9 (29 Sep 2022 10:30), Max: 36.9 (29 Sep 2022 10:30)  T(F): 98.4 (29 Sep 2022 10:30), Max: 98.4 (29 Sep 2022 10:30)  HR: 83 (29 Sep 2022 09:05) (64 - 110)  BP: 112/66 (29 Sep 2022 09:05) (112/66 - 172/84)  BP(mean): 70 (29 Sep 2022 03:00) (70 - 105)  RR: 18 (29 Sep 2022 10:30) (15 - 30)  SpO2: 100% (29 Sep 2022 10:30) (92% - 100%)    Parameters below as of 29 Sep 2022 10:30  Patient On (Oxygen Delivery Method): room air    Labs:  Blood type: O Positive  Rubella IgG: RPR: Negative                          10.6<L>   17.43<H> >-----------< 186    (  @ 09:00 )             31.7<L>                        12.5   10.10 >-----------< 197    (  @ 02:17 )             37.0      PE:  General: NAD  Abdomen: Mildly distended, appropriately tender, incision c/d/i with sterri strips in place  Extremities: No erythema, no pitting edema
OB Progress Note:  Delivery, POD#2    S: 37yo POD#2 s/p pLTCS for breech malpresentation. Course c/b h/o T2DM, Asthma, Vertigo. Her pain is well controlled. She is tolerating a regular diet and passing flatus. Denies N/V. Denies CP/SOB/lightheadedness/dizziness.   She is ambulating without difficulty.   Voiding spontaneously.    O:   Vital Signs Last 24 Hrs  T(C): 36.7 (30 Sep 2022 06:19), Max: 36.9 (29 Sep 2022 10:30)  T(F): 98 (30 Sep 2022 06:19), Max: 98.4 (29 Sep 2022 10:30)  HR: 85 (30 Sep 2022 06:19) (76 - 85)  BP: 132/65 (30 Sep 2022 06:19) (114/73 - 132/65)  RR: 18 (30 Sep 2022 06:19) (17 - 18)  SpO2: 97% (30 Sep 2022 06:19) (95% - 100%)    Parameters below as of 30 Sep 2022 06:19  Patient On (Oxygen Delivery Method): room air    Labs:  Blood type: O Positive  Rubella IgG: RPR: Negative                          10.6<L>   17.43<H> >-----------< 186    (  @ 09:00 )             31.7<L>                        12.5   10.10 >-----------< 197    (  @ 02:17 )             37.0      PE:  General: NAD  Abdomen: Mildly distended, appropriately tender, incision c/d/i with sterri strips in place  Extremities: No erythema, no pitting edema

## 2022-10-04 ENCOUNTER — APPOINTMENT (OUTPATIENT)
Dept: OBGYN | Facility: CLINIC | Age: 37
End: 2022-10-04

## 2022-10-04 VITALS
SYSTOLIC BLOOD PRESSURE: 144 MMHG | HEIGHT: 64 IN | DIASTOLIC BLOOD PRESSURE: 80 MMHG | BODY MASS INDEX: 36.7 KG/M2 | WEIGHT: 215 LBS

## 2022-10-04 DIAGNOSIS — Z3A.38 38 WEEKS GESTATION OF PREGNANCY: ICD-10-CM

## 2022-10-04 DIAGNOSIS — Z3A.20 20 WEEKS GESTATION OF PREGNANCY: ICD-10-CM

## 2022-10-04 DIAGNOSIS — Z3A.32 32 WEEKS GESTATION OF PREGNANCY: ICD-10-CM

## 2022-10-04 DIAGNOSIS — Z3A.12 12 WEEKS GESTATION OF PREGNANCY: ICD-10-CM

## 2022-10-04 DIAGNOSIS — J45.909 DISEASES OF THE RESPIRATORY SYSTEM COMPLICATING PREGNANCY, UNSPECIFIED TRIMESTER: ICD-10-CM

## 2022-10-04 DIAGNOSIS — Z3A.16 16 WEEKS GESTATION OF PREGNANCY: ICD-10-CM

## 2022-10-04 DIAGNOSIS — Z3A.28 28 WEEKS GESTATION OF PREGNANCY: ICD-10-CM

## 2022-10-04 DIAGNOSIS — O99.519 DISEASES OF THE RESPIRATORY SYSTEM COMPLICATING PREGNANCY, UNSPECIFIED TRIMESTER: ICD-10-CM

## 2022-10-04 DIAGNOSIS — Z3A.36 36 WEEKS GESTATION OF PREGNANCY: ICD-10-CM

## 2022-10-04 DIAGNOSIS — O35.1XX0 MATERNAL CARE FOR (SUSPECTED) CHROMOSOMAL ABNORMALITY IN FETUS, NOT APPLICABLE OR UNSPECIFIED: ICD-10-CM

## 2022-10-04 DIAGNOSIS — J45.909 DISEASES OF THE RESPIRATORY SYSTEM COMPLICATING PREGNANCY, FIRST TRIMESTER: ICD-10-CM

## 2022-10-04 DIAGNOSIS — Z3A.37 37 WEEKS GESTATION OF PREGNANCY: ICD-10-CM

## 2022-10-04 DIAGNOSIS — Z87.898 PERSONAL HISTORY OF OTHER SPECIFIED CONDITIONS: ICD-10-CM

## 2022-10-04 DIAGNOSIS — O99.511 DISEASES OF THE RESPIRATORY SYSTEM COMPLICATING PREGNANCY, FIRST TRIMESTER: ICD-10-CM

## 2022-10-04 DIAGNOSIS — Z3A.34 34 WEEKS GESTATION OF PREGNANCY: ICD-10-CM

## 2022-10-04 DIAGNOSIS — O14.90 UNSPECIFIED PRE-ECLAMPSIA, UNSPECIFIED TRIMESTER: ICD-10-CM

## 2022-10-04 DIAGNOSIS — Z3A.30 30 WEEKS GESTATION OF PREGNANCY: ICD-10-CM

## 2022-10-04 DIAGNOSIS — Z3A.39 39 WEEKS GESTATION OF PREGNANCY: ICD-10-CM

## 2022-10-04 DIAGNOSIS — Z3A.24 24 WEEKS GESTATION OF PREGNANCY: ICD-10-CM

## 2022-10-04 PROCEDURE — 99213 OFFICE O/P EST LOW 20 MIN: CPT | Mod: TH

## 2022-10-04 NOTE — HISTORY OF PRESENT ILLNESS
[Postpartum Follow Up] : postpartum follow up [Complications:___] : no complications [Delivery Date: ___] : on [unfilled] [Primary C/S] : delivered by  section [Female] : Delivery History: baby girl [Breastfeeding] : not currently nursing [S/Sx PP Depression] : no signs/symptoms of postpartum depression [None] : No associated symptoms are reported [Clean/Dry/Intact] : clean, dry and intact [Erythema] : not erythematous [Back to Normal] : is back to normal in size [Mild] : mild vaginal bleeding [Normal] : the vagina was normal [Cervix Sample Taken] : cervical sample not taken for a Pap smear [Not Done] : Examination of breasts not done [Doing Well] : is doing well [No Sign of Infection] : is showing no signs of infection [Excellent Pain Control] : has excellent pain control [No [place]] : to avoid sexual intercourse [Limited ADLs] : to participate in activities of daily living with limitations [Limited Work] : to work with limitations [Limited Housework] : to do housework with limitations [Limited Sports___] : to participate in sports with limitations~U: [unfilled] [de-identified] : 39 weeks

## 2022-10-17 ENCOUNTER — RX RENEWAL (OUTPATIENT)
Age: 37
End: 2022-10-17

## 2022-11-01 ENCOUNTER — APPOINTMENT (OUTPATIENT)
Dept: OBGYN | Facility: CLINIC | Age: 37
End: 2022-11-01

## 2022-11-01 VITALS
WEIGHT: 196 LBS | HEIGHT: 64 IN | SYSTOLIC BLOOD PRESSURE: 122 MMHG | BODY MASS INDEX: 33.46 KG/M2 | DIASTOLIC BLOOD PRESSURE: 74 MMHG

## 2022-11-01 NOTE — HISTORY OF PRESENT ILLNESS
[Postpartum Follow Up] : postpartum follow up [Complications:___] : no complications [Primary C/S] : delivered by  section [Breastfeeding] : not currently nursing [S/Sx PP Depression] : no signs/symptoms of postpartum depression [Erythema] : not erythematous [Healed] : healed [Back to Normal] : is back to normal in size [Normal] : the vagina was normal [Cervix Sample Taken] : cervical sample not taken for a Pap smear [Not Done] : Examination of breasts not done [Doing Well] : is doing well [No Sign of Infection] : is showing no signs of infection [Excellent Pain Control] : has excellent pain control [None] : None [de-identified] : ANNEMARIE silva scheduled to adjust insulin

## 2022-11-07 ENCOUNTER — APPOINTMENT (OUTPATIENT)
Dept: ENDOCRINOLOGY | Facility: CLINIC | Age: 37
End: 2022-11-07

## 2022-11-07 VITALS
TEMPERATURE: 97.5 F | OXYGEN SATURATION: 99 % | WEIGHT: 195 LBS | HEIGHT: 64 IN | DIASTOLIC BLOOD PRESSURE: 90 MMHG | SYSTOLIC BLOOD PRESSURE: 118 MMHG | BODY MASS INDEX: 33.29 KG/M2 | HEART RATE: 86 BPM

## 2022-11-07 DIAGNOSIS — Z79.4 TYPE 2 DIABETES MELLITUS W/OUT COMPLICATIONS: ICD-10-CM

## 2022-11-07 DIAGNOSIS — E11.9 TYPE 2 DIABETES MELLITUS W/OUT COMPLICATIONS: ICD-10-CM

## 2022-11-07 DIAGNOSIS — O24.414 GESTATIONAL DIABETES MELLITUS IN PREGNANCY, INSULIN CONTROLLED: ICD-10-CM

## 2022-11-07 LAB
GLUCOSE BLDC GLUCOMTR-MCNC: 102
GLUCOSE BLDC GLUCOMTR-MCNC: 110
HBA1C MFR BLD HPLC: 6.6

## 2022-11-07 PROCEDURE — 83036 HEMOGLOBIN GLYCOSYLATED A1C: CPT | Mod: QW

## 2022-11-07 PROCEDURE — 99204 OFFICE O/P NEW MOD 45 MIN: CPT

## 2022-11-07 PROCEDURE — 82962 GLUCOSE BLOOD TEST: CPT

## 2022-11-07 NOTE — HISTORY OF PRESENT ILLNESS
[FreeTextEntry1] : Primary diagnosis: Type 2 diabetes\par \par #Type 2 diabetes\par -Patient has history of 3 pregnancies.\par -Her first pregnancy was 14-15 years ago and at the time had gestational diabetes however it was diet controlled\par -Her second pregnancy was about 2 years ago and she developed gestational diabetes again and this time was managed on insulin.\par - post pregnancy patient stopped her insulin and patient collapsed and found to have high blood sugars and patient was re-started on short acting 6 units TID admelog \par -patient conceived again 1 year later- and started on admelog 32 units TID with meals along with NPH 50 units.  She is now 1 month postpartum\par -Of note, babies were healthy weight without any issues \par \par Current regimen:\par Patient is on MFM 1000mg BID \par Admelog 10 units TID + Basaglar 15 units \par Blood sugars- fasting 108 \par post 2 meals- 130-140 \par Hypoglycemia: Denies\par FH of diabetes: grandparents, mother and sister diabetes. \par History of CAD: Denies\par History of CVA: Denies \par \par eGFR: 120\par \par Last eye exam: During pregnancy \par Evidence of retinopathy? Denies \par \par Last foot exam: Due.\par Any issues? Denies any issues \par \par Social History: \par Alcohol: Denies \par Tobacco: Denies\par Work: Not currently \par \par Surgical history: \par 2 abortions. \par 2 c sections 2004 \par \par

## 2022-11-07 NOTE — ASSESSMENT
[FreeTextEntry1] : #Type 2 diabetes\par -Patient has history of gestational diabetes that has now progressed to type 2 diabetes.\par -She initially developed gestational diabetes in her first pregnancy however was not requiring insulin.  During her second pregnancy she developed gestational diabetes again however insulin had to be continued post pregnancy as well.\par -Currently her blood sugars appear to be well controlled based on her home blood sugar readings\par -Her hemoglobin A1c today 6.6%\par -She does mention that she has been having some dizziness however also has a diagnosis of vertigo.  She denies having any hypoglycemia during these episodes.  In fact, she was feeling slightly dizzy during her office visit today, we checked her blood sugar at the time and it was noted to be 102.\par Plan:\par -At this time, patient's blood sugars appear to be controlled based on her home blood sugar log.  \par -Recommend to continue with Basaglar 15 units.  Advised to decrease her Admelog based on her blood sugars.  She can decrease her Admelog to 6-7 units with meals.\par -Continue with metformin\par -Close follow-up with the patient in about 2 to 3 months.  Based on her blood sugar readings at that time, we will consider decreasing her insulin further with the possible addition of another third diabetic agent that could also help with weight loss\par \par Patient counseled extensively about the complications of diabetes including but not limited to nephropathy, neuropathy, and retinopathy. We discussed the importance of annual foot and optho exams. Explained that ideally blood sugars in the morning prior to breakfast should be between 80 and 130. Blood sugars should be checked 2 hours after eating and should be <180. If blood sugar is <70, patient should treat the blood sugar FIRST and then contact provider immediately.\par

## 2022-11-09 ENCOUNTER — APPOINTMENT (OUTPATIENT)
Dept: INTERNAL MEDICINE | Facility: CLINIC | Age: 37
End: 2022-11-09

## 2022-11-14 ENCOUNTER — APPOINTMENT (OUTPATIENT)
Dept: CARDIOLOGY | Facility: CLINIC | Age: 37
End: 2022-11-14

## 2022-11-14 ENCOUNTER — NON-APPOINTMENT (OUTPATIENT)
Age: 37
End: 2022-11-14

## 2022-11-14 VITALS
WEIGHT: 193 LBS | TEMPERATURE: 98.4 F | OXYGEN SATURATION: 97 % | BODY MASS INDEX: 32.95 KG/M2 | RESPIRATION RATE: 17 BRPM | HEART RATE: 88 BPM | SYSTOLIC BLOOD PRESSURE: 123 MMHG | DIASTOLIC BLOOD PRESSURE: 84 MMHG | HEIGHT: 64 IN

## 2022-11-14 PROCEDURE — 99204 OFFICE O/P NEW MOD 45 MIN: CPT | Mod: 25

## 2022-11-14 PROCEDURE — 93000 ELECTROCARDIOGRAM COMPLETE: CPT

## 2022-11-14 NOTE — DISCUSSION/SUMMARY
[FreeTextEntry1] : 36F with HTN DM2 presents to establish care\par \par abnormal EKG\par -asymptomatic, not prev cardiac testing or known cardiac disease\par -will check tte to eval for structural heart dz, evidence of LVH\par -will send for calcium score\par -pending results, will consider additional testing, CTA cors\par \par \par HTN\par -pt with controlled bp here today, not currently on meds\par -states she is trying to lose weight which will improve bp even more\par -will hold off on treatment at this time\par -will cont to monitor\par \par f/u after initial testing\par \par >45 min spent on complete encounter. [EKG obtained to assist in diagnosis and management of assessed problem(s)] : EKG obtained to assist in diagnosis and management of assessed problem(s)

## 2022-11-14 NOTE — REVIEW OF SYSTEMS
[Fever] : no fever [Headache] : no headache [Weight Gain (___ Lbs)] : no recent weight gain [Chills] : no chills [Feeling Fatigued] : not feeling fatigued [Weight Loss (___ Lbs)] : no recent weight loss [Blurry Vision] : no blurred vision [Sore Throat] : no sore throat [SOB] : no shortness of breath [Dyspnea on exertion] : not dyspnea during exertion [Chest Discomfort] : no chest discomfort [Lower Ext Edema] : no extremity edema [Palpitations] : no palpitations [Orthopnea] : no orthopnea [Syncope] : no syncope [Cough] : no cough [Wheezing] : no wheezing [Nausea] : no nausea [Vomiting] : no vomiting [Dizziness] : dizziness [Confusion] : no confusion was observed [Easy Bleeding] : no tendency for easy bleeding [Easy Bruising] : no tendency for easy bruising

## 2022-11-14 NOTE — HISTORY OF PRESENT ILLNESS
[FreeTextEntry1] : 36F with HTN DM2 presents to establish care\par Sent in by PMD: Dr Jorgito Martin\par \par pt had a c/s 2 months ago, stats after the surgery, her BP was elevated. states when she checks at home her bp is 140s/90s. \par bp here today 123/84. \par \par pt denies CP, SOB, on exertion. but states occasional cp at night, unable to characterize it further.  Denies palpitations, diaphoresis, syncope, LE edema, orthopnea. pt does endorse hx of dizziness, on meclizine. \par \par pt states over the past few weeks she has had several episodes, possibly anxiety/panic attacks and palpitations, sweating. \par \par Prev cardiac history: \par \par Exercise: none currently since having a baby 2 months ago. \par Diet: none\par \par Previous cardiac testing: none\par Recent labs:\par \par \par EKG: SR, twi v3 -v6. non specific st changes inferior leads. \par \par \par Med hx: HTN DM vertigo\par OBGYN hx:  3 children (13yo 1.4yo, 1.5month yo).  + Hx of gestational DM, ? gestational HTN.  no Hx of miscarriage. irregular menstrual cycles at baseline. \par Sx hx: c/s, d and C for an . \par Family hx: HTN\par Social hx:  lives in [place] with spouse and kids. not working. no tob/etoh/drugs\par Meds: inhalers prn\par Allergies: nkda \par

## 2022-11-15 ENCOUNTER — RX RENEWAL (OUTPATIENT)
Age: 37
End: 2022-11-15

## 2022-11-16 RX ORDER — MECLIZINE HYDROCHLORIDE 25 MG/1
25 TABLET ORAL 3 TIMES DAILY
Qty: 90 | Refills: 0 | Status: ACTIVE | COMMUNITY
Start: 2022-05-04 | End: 1900-01-01

## 2022-11-16 RX ORDER — DOCUSATE SODIUM 100 MG/1
100 CAPSULE ORAL TWICE DAILY
Qty: 60 | Refills: 2 | Status: ACTIVE | COMMUNITY
Start: 2022-09-06 | End: 1900-01-01

## 2022-11-17 ENCOUNTER — APPOINTMENT (OUTPATIENT)
Dept: INTERNAL MEDICINE | Facility: CLINIC | Age: 37
End: 2022-11-17

## 2022-11-23 ENCOUNTER — NON-APPOINTMENT (OUTPATIENT)
Age: 37
End: 2022-11-23

## 2022-11-23 ENCOUNTER — APPOINTMENT (OUTPATIENT)
Dept: CARDIOLOGY | Facility: CLINIC | Age: 37
End: 2022-11-23

## 2022-11-30 ENCOUNTER — APPOINTMENT (OUTPATIENT)
Dept: INTERNAL MEDICINE | Facility: CLINIC | Age: 37
End: 2022-11-30

## 2022-12-04 ENCOUNTER — TRANSCRIPTION ENCOUNTER (OUTPATIENT)
Age: 37
End: 2022-12-04

## 2022-12-05 NOTE — OB PROVIDER IHI INDUCTION/AUGMENTATION NOTE - NS_CHECKALL_OBGYN_ALL_OB
Myrbetriq Pending    Additonal information required from provider.  Prescription Drug Insurance: [place] Medicaid    Notes: Faxed prior authorization request to [phone] for signature. Please sign and fax to [facility] [phone], and they will complete the PA. Please update encounter when faxed to pharmacy. Thank you!    Order was written/H&P was completed/Contractions pattern was reviewed/FHR was reviewed/Induction / Augmentation was discussed

## 2022-12-06 LAB — SURGICAL PATHOLOGY STUDY: SIGNIFICANT CHANGE UP

## 2022-12-07 ENCOUNTER — APPOINTMENT (OUTPATIENT)
Dept: INTERNAL MEDICINE | Facility: CLINIC | Age: 37
End: 2022-12-07

## 2022-12-13 ENCOUNTER — APPOINTMENT (OUTPATIENT)
Dept: CARDIOLOGY | Facility: CLINIC | Age: 37
End: 2022-12-13

## 2022-12-14 ENCOUNTER — RX RENEWAL (OUTPATIENT)
Age: 37
End: 2022-12-14

## 2022-12-15 ENCOUNTER — RX RENEWAL (OUTPATIENT)
Age: 37
End: 2022-12-15

## 2022-12-27 ENCOUNTER — APPOINTMENT (OUTPATIENT)
Dept: OBGYN | Facility: CLINIC | Age: 37
End: 2022-12-27

## 2023-01-11 ENCOUNTER — RX RENEWAL (OUTPATIENT)
Age: 38
End: 2023-01-11

## 2023-01-24 ENCOUNTER — RX RENEWAL (OUTPATIENT)
Age: 38
End: 2023-01-24

## 2023-01-30 ENCOUNTER — APPOINTMENT (OUTPATIENT)
Dept: ENDOCRINOLOGY | Facility: CLINIC | Age: 38
End: 2023-01-30

## 2023-02-07 ENCOUNTER — APPOINTMENT (OUTPATIENT)
Dept: OBGYN | Facility: CLINIC | Age: 38
End: 2023-02-07

## 2023-02-08 ENCOUNTER — APPOINTMENT (OUTPATIENT)
Dept: NEUROLOGY | Facility: CLINIC | Age: 38
End: 2023-02-08

## 2023-02-10 ENCOUNTER — APPOINTMENT (OUTPATIENT)
Dept: ENDOCRINOLOGY | Facility: CLINIC | Age: 38
End: 2023-02-10

## 2023-04-08 ENCOUNTER — RX RENEWAL (OUTPATIENT)
Age: 38
End: 2023-04-08

## 2023-04-10 ENCOUNTER — RX RENEWAL (OUTPATIENT)
Age: 38
End: 2023-04-10

## 2023-06-04 ENCOUNTER — RX RENEWAL (OUTPATIENT)
Age: 38
End: 2023-06-04

## 2023-06-04 RX ORDER — INSULIN GLARGINE 100 [IU]/ML
100 INJECTION, SOLUTION SUBCUTANEOUS
Qty: 1 | Refills: 3 | Status: ACTIVE | COMMUNITY
Start: 2020-12-29 | End: 1900-01-01

## 2023-06-05 ENCOUNTER — EMERGENCY (EMERGENCY)
Facility: HOSPITAL | Age: 38
LOS: 1 days | Discharge: HOME | End: 2023-06-05
Admitting: EMERGENCY MEDICINE
Payer: MEDICAID

## 2023-06-05 ENCOUNTER — RX RENEWAL (OUTPATIENT)
Age: 38
End: 2023-06-05

## 2023-06-05 ENCOUNTER — APPOINTMENT (OUTPATIENT)
Dept: INTERNAL MEDICINE | Facility: CLINIC | Age: 38
End: 2023-06-05

## 2023-06-05 VITALS
TEMPERATURE: 98 F | HEART RATE: 70 BPM | DIASTOLIC BLOOD PRESSURE: 96 MMHG | OXYGEN SATURATION: 99 % | RESPIRATION RATE: 18 BRPM | SYSTOLIC BLOOD PRESSURE: 143 MMHG

## 2023-06-05 DIAGNOSIS — Z98.890 OTHER SPECIFIED POSTPROCEDURAL STATES: Chronic | ICD-10-CM

## 2023-06-05 LAB
ALBUMIN SERPL ELPH-MCNC: 4.5 G/DL — SIGNIFICANT CHANGE UP (ref 3.3–5)
ALP SERPL-CCNC: 89 U/L — SIGNIFICANT CHANGE UP (ref 40–120)
ALT FLD-CCNC: 13 U/L — SIGNIFICANT CHANGE UP (ref 4–33)
ANION GAP SERPL CALC-SCNC: 15 MMOL/L — HIGH (ref 7–14)
APPEARANCE UR: SIGNIFICANT CHANGE UP
AST SERPL-CCNC: 17 U/L — SIGNIFICANT CHANGE UP (ref 4–32)
BACTERIA # UR AUTO: ABNORMAL
BASOPHILS # BLD AUTO: 0.08 K/UL — SIGNIFICANT CHANGE UP (ref 0–0.2)
BASOPHILS NFR BLD AUTO: 0.9 % — SIGNIFICANT CHANGE UP (ref 0–2)
BILIRUB SERPL-MCNC: 0.9 MG/DL — SIGNIFICANT CHANGE UP (ref 0.2–1.2)
BILIRUB UR-MCNC: NEGATIVE — SIGNIFICANT CHANGE UP
BUN SERPL-MCNC: 9 MG/DL — SIGNIFICANT CHANGE UP (ref 7–23)
CALCIUM SERPL-MCNC: 9.3 MG/DL — SIGNIFICANT CHANGE UP (ref 8.4–10.5)
CHLORIDE SERPL-SCNC: 100 MMOL/L — SIGNIFICANT CHANGE UP (ref 98–107)
CO2 SERPL-SCNC: 20 MMOL/L — LOW (ref 22–31)
COLOR SPEC: ABNORMAL
CREAT SERPL-MCNC: 0.59 MG/DL — SIGNIFICANT CHANGE UP (ref 0.5–1.3)
DIFF PNL FLD: ABNORMAL
EGFR: 118 ML/MIN/1.73M2 — SIGNIFICANT CHANGE UP
EOSINOPHIL # BLD AUTO: 0.04 K/UL — SIGNIFICANT CHANGE UP (ref 0–0.5)
EOSINOPHIL NFR BLD AUTO: 0.5 % — SIGNIFICANT CHANGE UP (ref 0–6)
EPI CELLS # UR: 4 /HPF — SIGNIFICANT CHANGE UP (ref 0–5)
GLUCOSE SERPL-MCNC: 194 MG/DL — HIGH (ref 70–99)
GLUCOSE UR QL: NEGATIVE — SIGNIFICANT CHANGE UP
HCT VFR BLD CALC: 40.7 % — SIGNIFICANT CHANGE UP (ref 34.5–45)
HGB BLD-MCNC: 13.5 G/DL — SIGNIFICANT CHANGE UP (ref 11.5–15.5)
IANC: 6.34 K/UL — SIGNIFICANT CHANGE UP (ref 1.8–7.4)
IMM GRANULOCYTES NFR BLD AUTO: 0.2 % — SIGNIFICANT CHANGE UP (ref 0–0.9)
KETONES UR-MCNC: ABNORMAL
LEUKOCYTE ESTERASE UR-ACNC: ABNORMAL
LYMPHOCYTES # BLD AUTO: 1.78 K/UL — SIGNIFICANT CHANGE UP (ref 1–3.3)
LYMPHOCYTES # BLD AUTO: 20.8 % — SIGNIFICANT CHANGE UP (ref 13–44)
MCHC RBC-ENTMCNC: 28 PG — SIGNIFICANT CHANGE UP (ref 27–34)
MCHC RBC-ENTMCNC: 33.2 GM/DL — SIGNIFICANT CHANGE UP (ref 32–36)
MCV RBC AUTO: 84.4 FL — SIGNIFICANT CHANGE UP (ref 80–100)
MONOCYTES # BLD AUTO: 0.3 K/UL — SIGNIFICANT CHANGE UP (ref 0–0.9)
MONOCYTES NFR BLD AUTO: 3.5 % — SIGNIFICANT CHANGE UP (ref 2–14)
NEUTROPHILS # BLD AUTO: 6.34 K/UL — SIGNIFICANT CHANGE UP (ref 1.8–7.4)
NEUTROPHILS NFR BLD AUTO: 74.1 % — SIGNIFICANT CHANGE UP (ref 43–77)
NITRITE UR-MCNC: NEGATIVE — SIGNIFICANT CHANGE UP
NRBC # BLD: 0 /100 WBCS — SIGNIFICANT CHANGE UP (ref 0–0)
NRBC # FLD: 0 K/UL — SIGNIFICANT CHANGE UP (ref 0–0)
PH UR: 5.5 — SIGNIFICANT CHANGE UP (ref 5–8)
PLATELET # BLD AUTO: 308 K/UL — SIGNIFICANT CHANGE UP (ref 150–400)
POTASSIUM SERPL-MCNC: 3.9 MMOL/L — SIGNIFICANT CHANGE UP (ref 3.5–5.3)
POTASSIUM SERPL-SCNC: 3.9 MMOL/L — SIGNIFICANT CHANGE UP (ref 3.5–5.3)
PROT SERPL-MCNC: 7.3 G/DL — SIGNIFICANT CHANGE UP (ref 6–8.3)
PROT UR-MCNC: ABNORMAL
RBC # BLD: 4.82 M/UL — SIGNIFICANT CHANGE UP (ref 3.8–5.2)
RBC # FLD: 12.8 % — SIGNIFICANT CHANGE UP (ref 10.3–14.5)
RBC CASTS # UR COMP ASSIST: >10 /HPF — HIGH (ref 0–4)
SODIUM SERPL-SCNC: 135 MMOL/L — SIGNIFICANT CHANGE UP (ref 135–145)
SP GR SPEC: 1.02 — SIGNIFICANT CHANGE UP (ref 1.01–1.05)
UROBILINOGEN FLD QL: SIGNIFICANT CHANGE UP
WBC # BLD: 8.56 K/UL — SIGNIFICANT CHANGE UP (ref 3.8–10.5)
WBC # FLD AUTO: 8.56 K/UL — SIGNIFICANT CHANGE UP (ref 3.8–10.5)
WBC UR QL: 10 /HPF — HIGH (ref 0–5)

## 2023-06-05 PROCEDURE — 99284 EMERGENCY DEPT VISIT MOD MDM: CPT

## 2023-06-05 PROCEDURE — 76830 TRANSVAGINAL US NON-OB: CPT | Mod: 26

## 2023-06-05 RX ORDER — SODIUM CHLORIDE 9 MG/ML
1000 INJECTION INTRAMUSCULAR; INTRAVENOUS; SUBCUTANEOUS ONCE
Refills: 0 | Status: COMPLETED | OUTPATIENT
Start: 2023-06-05 | End: 2023-06-05

## 2023-06-05 RX ORDER — KETOROLAC TROMETHAMINE 30 MG/ML
15 SYRINGE (ML) INJECTION ONCE
Refills: 0 | Status: DISCONTINUED | OUTPATIENT
Start: 2023-06-05 | End: 2023-06-05

## 2023-06-05 RX ADMIN — Medication 15 MILLIGRAM(S): at 20:40

## 2023-06-05 RX ADMIN — SODIUM CHLORIDE 1000 MILLILITER(S): 9 INJECTION INTRAMUSCULAR; INTRAVENOUS; SUBCUTANEOUS at 20:32

## 2023-06-05 NOTE — ED ADULT NURSE NOTE - CHIEF COMPLAINT QUOTE
pt was seen at [facility] c/o pelvic pain that feels like "labor" when she has her period.  pt was sent to ED for ultrasound

## 2023-06-05 NOTE — ED ADULT NURSE NOTE - OBJECTIVE STATEMENT
A&Ox4. Past medical history of diabetes, vertigo, hypertension. Presents emergency room for evaluation of pelvic pain in the setting of her menstruation times yesterday.  Patient states she had a  9 months ago with no complication.  She states since then she has been having longer cramping pain and bleeding for the past 9 months.  She states she has been following up with her OB/GYN.  Patient states since yesterday her symptoms have been getting progressively worse and has noted to have more cramping.  She states she had 2 episodes of Non bloody N/V. She states she has been changing 3 pads a day and she is soaking through 75% of the pads.  She denies any urinary symptoms, vaginal discharge, fever or chills.  Patient states she was seen in urgent care and was referred to the emergency room for further evaluation. Respirations even and unlabored. 20 gauge IV started in left AC. Labs obtained, awaiting results. Medications given as per current care plan. Safety precautions in place.

## 2023-06-05 NOTE — ED PROVIDER NOTE - PATIENT PORTAL LINK FT
You can access the FollowMyHealth Patient Portal offered by [facility] by registering at the following website: [email or web address]. By joining [employer]’s FollowMyHealth portal, you will also be able to view your health information using other applications (apps) compatible with our system.

## 2023-06-05 NOTE — ED ADULT NURSE NOTE - NSFALLUNIVINTERV_ED_ALL_ED
Bed/Stretcher in lowest position, wheels locked, appropriate side rails in place/Call bell, personal items and telephone in reach/Instruct patient to call for assistance before getting out of bed/chair/stretcher/Non-slip footwear applied when patient is off stretcher/[place] to call system/Physically safe environment - no spills, clutter or unnecessary equipment/Purposeful proactive rounding/Room/bathroom lighting operational, light cord in reach

## 2023-06-05 NOTE — ED ADULT TRIAGE NOTE - CHIEF COMPLAINT QUOTE
pt was seen at [facility] c/o pelvic pain that feels like "labor" when she has her period.  pt was sent to ED for ultrasound

## 2023-06-05 NOTE — ED PROVIDER NOTE - NSFOLLOWUPINSTRUCTIONS_ED_ALL_ED_FT
Follow up with your OB/GYN as scheduled next week  Take Motrin as needed for the pain with food only  Advance activity as tolerated.  Continue all previously prescribed medications as directed unless otherwise instructed.  Follow up with your primary care physician in 48-72 hours- bring copies of your results.  Return to the ER for worsening or persistent symptoms, and/or ANY NEW OR CONCERNING SYMPTOMS. If you have issues obtaining follow up, please call: [phone] to obtain a doctor or specialist who takes your insurance in your area.  You may call [phone] to make an appointment with the internal medicine clinic.

## 2023-06-05 NOTE — ED PROVIDER NOTE - OBJECTIVE STATEMENT
29-year-old female with a past medical history of diabetes, vertigo, hypertension presents emergency room for evaluation of pelvic pain in the setting of her menstruation times yesterday.  Patient states she had a  9 months ago with no complication.  She states since then she has been having longer cramping pain and bleeding for the past 9 months.  She states she has been following up with her OB/GYN.  Patient states since yesterday her symptoms has been getting progressively worse and has noted to have more cramping.  she states she had 2 episodes of NBNB N/V. She states she has been changing 3 pads a day and she is soaking through 75% of the pads.  She denies any urinary symptoms, vaginal discharge, fever or chills.  Patient states she was seen in urgent care and was referred to the emergency room for further evaluation

## 2023-06-05 NOTE — ED PROVIDER NOTE - PROGRESS NOTE DETAILS
Pt reassessed at bedside, feels well, pain controlled. Informed of workup in ED, reviewed lab and/or radiology results (when applicable) with patient/caregiver. Informed pt of plan for discharge with instructions to follow up with PMD. Pt/caregiver expressed understanding of plan and agrees with plan for discharge. Strict return precautions discussed with patient in layman's terms, patient demonstrated understanding of return precautions. Devyn Gates PA-C

## 2023-06-06 ENCOUNTER — APPOINTMENT (OUTPATIENT)
Dept: OBGYN | Facility: CLINIC | Age: 38
End: 2023-06-06

## 2023-06-07 LAB
CULTURE RESULTS: SIGNIFICANT CHANGE UP
SPECIMEN SOURCE: SIGNIFICANT CHANGE UP

## 2023-06-08 NOTE — OB RN PATIENT PROFILE - AS SC BRADEN ACTIVITY
(4) walks frequently
time spent in review of laboratory data, radiology images and results, discussion with primary team/patient, and monitoring for potential decompensation. Interventions performed as documented above. In addition, time also spent to risks and benefits of the procedure, alternative procedures, diagnostic and therapeutic outcomes as well as further management plan. Complex patient requiring services. Interventional Pulmonology services provided to the patient were separate from general pulmonary service due to complexity of issues and interventions required. Time spent separate from time spent doing any procedures.

## 2023-07-16 ENCOUNTER — RX RENEWAL (OUTPATIENT)
Age: 38
End: 2023-07-16

## 2023-07-26 NOTE — OB RN TRIAGE NOTE - NSICDXPASTSURGICALHX_GEN_ALL_CORE_FT
Heart Failure Care Map  GOALS TO BE MET BEFORE DISCHARGE:    1. Decrease congestion and/or edema with diuretic therapy to achieve near optimal volume status.     Dyspnea improved: Yes, satisfactory for discharge.   Edema improved: Yes, satisfactory for discharge.        Last 24 hour I/O:   Intake/Output Summary (Last 24 hours) at 7/26/2023 1045  Last data filed at 7/26/2023 0800  Gross per 24 hour   Intake 1000 ml   Output 975 ml   Net 25 ml           Net I/O and Weights since admission:   06/26 1500 - 07/26 1459  In: 3898 [P.O.:3898]  Out: 6720 [Urine:6720]  Net: -2822     Vitals:    07/20/23 1320 07/22/23 0553 07/23/23 0424 07/25/23 0625   Weight: 80.7 kg (178 lb) (P) 79.4 kg (175 lb 0.7 oz) 80.6 kg (177 lb 11.1 oz) 88 kg (194 lb 0.1 oz)    07/26/23 0003   Weight: 89.9 kg (198 lb 3.1 oz)       2.  O2 sats > 90% on room air, or at prior home O2 therapy level.      Able to wean O2 this shift to keep sats above 90%?: Yes, satisfactory for discharge.   Does patient use Home O2? No          Current oxygenation status:   SpO2: 94 %     O2 Device: None (Room air),      3.  Tolerates ambulation and mobility near baseline.     Ambulation: Yes, satisfactory for discharge.   Times patient ambulated with staff this shift: 1    Please review the Heart Failure Care Map for additional HF goal outcomes.    Patient alert and oriented x 4. Very pleasant. No c/o pain other than generalized aches. No c/o SOB. LSC. O2 sat 94% room air. Started Normal saline at 100 ml/hr per MD's order. . Patient voided this morning 300 cc/ Appetite good and up in the chair for breakfast and than wanted to go back to bed. She walked with a cane and gait belt and one SBA for safety. Med surg patient. Call light in reach.     Esme Harris RN  7/26/2023     PAST SURGICAL HISTORY:  History of D&C

## 2023-07-28 NOTE — ED PROVIDER NOTE - NS ED MD DISPO DISCHARGE
July 28, 2023      Mr. Leo MENENDEZ Suhas  [address] [facility] [address]        Dear Reddy SchererSuhas,    The findings of your Colonoscopy indicate the following:  Sessile Serrated Adenoma Polyp(s)      Other Information:  Repeat colonoscopy in 2 years     Our office will send you a reminder card when it is time for you to schedule another screening procedure in 2 years.     It has been a pleasure caring for you.  If you have questions or concerns regarding these test results or your plan of care, please feel free to contact us.  You may either call and discuss these issues over the phone or schedule a follow-up office visit.      Sincerely,        Dr. Enio Dos Santos  Gastroenterology  [facility]  [id number] [place]  [address] [phone]        
Home

## 2023-12-20 NOTE — ED PROVIDER NOTE - NSTIMEPROVIDERCAREINITIATE_GEN_ER
She is a candidate for paxlovid, and we can order it for her if she is interested.  There are some drug interactions to note below and if her symptoms are mild she can elect to not to paxlovid. I'd leave it up to her.    Glomerular Filtration Rate (no units)   Date Value   12/12/2023 52 (L)      She would need to hold her buspar and her crestor while on the paxlovid. She can restart once the medication is completed.   She should also decrease her amlodipine by 1/2 tablet (2.5 mg daily).    25-Apr-2022 19:13

## 2024-01-10 ENCOUNTER — NON-APPOINTMENT (OUTPATIENT)
Age: 39
End: 2024-01-10

## 2024-01-10 ENCOUNTER — APPOINTMENT (OUTPATIENT)
Dept: CARDIOLOGY | Facility: CLINIC | Age: 39
End: 2024-01-10
Payer: MEDICAID

## 2024-01-10 VITALS
SYSTOLIC BLOOD PRESSURE: 133 MMHG | HEIGHT: 64 IN | DIASTOLIC BLOOD PRESSURE: 88 MMHG | WEIGHT: 173 LBS | TEMPERATURE: 99 F | HEART RATE: 84 BPM | OXYGEN SATURATION: 94 % | BODY MASS INDEX: 29.53 KG/M2

## 2024-01-10 DIAGNOSIS — R00.2 PALPITATIONS: ICD-10-CM

## 2024-01-10 DIAGNOSIS — R07.89 OTHER CHEST PAIN: ICD-10-CM

## 2024-01-10 DIAGNOSIS — R94.31 ABNORMAL ELECTROCARDIOGRAM [ECG] [EKG]: ICD-10-CM

## 2024-01-10 PROCEDURE — 93000 ELECTROCARDIOGRAM COMPLETE: CPT

## 2024-01-10 PROCEDURE — 99214 OFFICE O/P EST MOD 30 MIN: CPT | Mod: 25

## 2024-01-10 PROCEDURE — 36415 COLL VENOUS BLD VENIPUNCTURE: CPT

## 2024-01-10 NOTE — PHYSICAL EXAM
[Well Developed] : well developed [Well Nourished] : well nourished [No Acute Distress] : no acute distress [Normal Venous Pressure] : normal venous pressure [Normal S1, S2] : normal S1, S2 [No Murmur] : no murmur [Clear Lung Fields] : clear lung fields [Good Air Entry] : good air entry [No Respiratory Distress] : no respiratory distress  [Soft] : abdomen soft [Non Tender] : non-tender [Normal Gait] : normal gait [No Edema] : no edema [Moves all extremities] : moves all extremities [No Focal Deficits] : no focal deficits [Alert and Oriented] : alert and oriented [Normal Speech] : normal speech [Normal memory] : normal memory

## 2024-01-11 ENCOUNTER — APPOINTMENT (OUTPATIENT)
Dept: CARDIOLOGY | Facility: CLINIC | Age: 39
End: 2024-01-11
Payer: MEDICAID

## 2024-01-11 ENCOUNTER — MESSAGE (OUTPATIENT)
Age: 39
End: 2024-01-11

## 2024-01-11 LAB
ALBUMIN SERPL ELPH-MCNC: 4.2 G/DL
ALP BLD-CCNC: 101 U/L
ALT SERPL-CCNC: 17 U/L
ANION GAP SERPL CALC-SCNC: 11 MMOL/L
AST SERPL-CCNC: 16 U/L
BILIRUB SERPL-MCNC: 0.5 MG/DL
BUN SERPL-MCNC: 15 MG/DL
CALCIUM SERPL-MCNC: 9.8 MG/DL
CHLORIDE SERPL-SCNC: 102 MMOL/L
CO2 SERPL-SCNC: 23 MMOL/L
CREAT SERPL-MCNC: 0.57 MG/DL
EGFR: 118 ML/MIN/1.73M2
GLUCOSE SERPL-MCNC: 198 MG/DL
POTASSIUM SERPL-SCNC: 4.3 MMOL/L
PROT SERPL-MCNC: 7.2 G/DL
SODIUM SERPL-SCNC: 136 MMOL/L
TROPONIN-T, HIGH SENSITIVITY: <6 NG/L

## 2024-01-11 PROCEDURE — 93015 CV STRESS TEST SUPVJ I&R: CPT

## 2024-01-22 ENCOUNTER — APPOINTMENT (OUTPATIENT)
Dept: CARDIOLOGY | Facility: CLINIC | Age: 39
End: 2024-01-22

## 2024-01-23 NOTE — DISCUSSION/SUMMARY
[EKG obtained to assist in diagnosis and management of assessed problem(s)] : EKG obtained to assist in diagnosis and management of assessed problem(s) [FreeTextEntry1] : pt with cardiac risk factors and atypical chest pain ECG without changes check treadmill stress test to evaluate ET and for cardiac ischemia check TTE to evaluate for structural heart disease check labs

## 2024-01-23 NOTE — HISTORY OF PRESENT ILLNESS
[FreeTextEntry1] : 38F HTN, DM2 on insulin presents for evaluation of chest pain Primary Cardiologist: Dr. Del Burgess  two nights before experienced chest pain/pressure worse with laying down and associated wth some SOB and pounding sensation in the chest better with sitting up came and went 1:30AM to 5AM unable to sleep  Fam hx: Mother - MI age 61 Father - MI age 65

## 2024-01-23 NOTE — REVIEW OF SYSTEMS
[Dizziness] : dizziness [SOB] : shortness of breath [Chest Discomfort] : chest discomfort [Palpitations] : palpitations [Negative] : ENT [Dyspnea on exertion] : not dyspnea during exertion [Lower Ext Edema] : no extremity edema [Orthopnea] : no orthopnea [Syncope] : no syncope [Cough] : no cough [Wheezing] : no wheezing [Nausea] : no nausea [Vomiting] : no vomiting [Confusion] : no confusion was observed [Easy Bleeding] : no tendency for easy bleeding [Easy Bruising] : no tendency for easy bruising

## 2024-01-23 NOTE — ADDENDUM
[FreeTextEntry1] : 1/23/24: Treadmill stress test 1/11/24 with ischemic ECG changes and intermediate Duke Treadmill Score. Will check stress echo

## 2024-01-24 ENCOUNTER — APPOINTMENT (OUTPATIENT)
Dept: INTERNAL MEDICINE | Facility: CLINIC | Age: 39
End: 2024-01-24

## 2024-01-25 ENCOUNTER — APPOINTMENT (OUTPATIENT)
Dept: CARDIOLOGY | Facility: CLINIC | Age: 39
End: 2024-01-25
Payer: MEDICAID

## 2024-01-25 PROCEDURE — 93351 STRESS TTE COMPLETE: CPT

## 2024-01-25 PROCEDURE — ZZZZZ: CPT

## 2024-03-04 NOTE — OB RN PATIENT PROFILE - ALCOHOL USE HISTORY SINGLE SELECT

## 2024-04-08 ENCOUNTER — APPOINTMENT (OUTPATIENT)
Dept: INTERNAL MEDICINE | Facility: CLINIC | Age: 39
End: 2024-04-08

## 2024-04-08 NOTE — HISTORY OF PRESENT ILLNESS
[FreeTextEntry8] : 38 F LMP presents with a few days of URI symptoms including: Denies Rhinitis, Denies Cough Denies Sinus congestion, Denies Chest Congestion Denies Sore Throat Denies Hoarseness, Dysphagia,  Denies fever, body aches earache,  Denies SOB or wheezing or CP or palpitations Denies NVD, abdominal pain Denies HA + covid vaccinated denies home covid test negative denies any recent travel denies any recent sick contacts denies any loss of taste or smell. pt able to tolerate po take otc  PMHX: Allergies: Medications: Surgical Hx: Family Hx: Mother Father Maternal Grandmother Maternal Grandfather Paternal Grandmother Paternal Grandfather Siblings Social Hx ETOH  -  socially Tobacco - denies Illict Drug use - denies Occupation:

## 2024-04-08 NOTE — PHYSICAL EXAM
[No Acute Distress] : no acute distress [Well Nourished] : well nourished [Well Developed] : well developed [Well-Appearing] : well-appearing [Normal Sclera/Conjunctiva] : normal sclera/conjunctiva [PERRL] : pupils equal round and reactive to light [EOMI] : extraocular movements intact [Normal Outer Ear/Nose] : the outer ears and nose were normal in appearance [Normal Oropharynx] : the oropharynx was normal [No JVD] : no jugular venous distention [No Lymphadenopathy] : no lymphadenopathy [Supple] : supple [No Respiratory Distress] : no respiratory distress  [No Accessory Muscle Use] : no accessory muscle use [Clear to Auscultation] : lungs were clear to auscultation bilaterally [Normal Rate] : normal rate  [Regular Rhythm] : with a regular rhythm [Normal S1, S2] : normal S1 and S2 [No Edema] : there was no peripheral edema [Soft] : abdomen soft [Non Tender] : non-tender [Non-distended] : non-distended [Normal Bowel Sounds] : normal bowel sounds [Normal Posterior Cervical Nodes] : no posterior cervical lymphadenopathy [Normal Anterior Cervical Nodes] : no anterior cervical lymphadenopathy [No CVA Tenderness] : no CVA  tenderness [No Spinal Tenderness] : no spinal tenderness [No Joint Swelling] : no joint swelling [Grossly Normal Strength/Tone] : grossly normal strength/tone [No Rash] : no rash [Coordination Grossly Intact] : coordination grossly intact [No Focal Deficits] : no focal deficits [Normal Gait] : normal gait [Deep Tendon Reflexes (DTR)] : deep tendon reflexes were 2+ and symmetric [Normal Affect] : the affect was normal [Normal Insight/Judgement] : insight and judgment were intact

## 2024-04-08 NOTE — HEALTH RISK ASSESSMENT
[No] : No [No falls in past year] : Patient reported no falls in the past year [0] : 2) Feeling down, depressed, or hopeless: Not at all (0) [PHQ-2 Negative - No further assessment needed] : PHQ-2 Negative - No further assessment needed [[id number]] : 0 [Never] : Never

## 2024-05-23 ENCOUNTER — RX RENEWAL (OUTPATIENT)
Age: 39
End: 2024-05-23

## 2024-05-23 RX ORDER — PEN NEEDLE, DIABETIC 32GX 5/32"
32G X 4 MM NEEDLE, DISPOSABLE MISCELLANEOUS
Qty: 100 | Refills: 3 | Status: ACTIVE | COMMUNITY
Start: 2023-07-16 | End: 1900-01-01

## 2024-05-24 ENCOUNTER — RX RENEWAL (OUTPATIENT)
Age: 39
End: 2024-05-24

## 2024-05-24 RX ORDER — INSULIN LISPRO 100 U/ML
100 INJECTION, SOLUTION SUBCUTANEOUS
Qty: 3 | Refills: 3 | Status: ACTIVE | COMMUNITY
Start: 2020-12-28 | End: 1900-01-01

## 2024-06-27 ENCOUNTER — RX RENEWAL (OUTPATIENT)
Age: 39
End: 2024-06-27

## 2024-07-08 ENCOUNTER — NON-APPOINTMENT (OUTPATIENT)
Age: 39
End: 2024-07-08

## 2024-07-09 ENCOUNTER — APPOINTMENT (OUTPATIENT)
Dept: ORTHOPEDIC SURGERY | Facility: CLINIC | Age: 39
End: 2024-07-09
Payer: MEDICAID

## 2024-07-09 ENCOUNTER — NON-APPOINTMENT (OUTPATIENT)
Age: 39
End: 2024-07-09

## 2024-07-09 VITALS — WEIGHT: 173 LBS | BODY MASS INDEX: 29.53 KG/M2 | HEIGHT: 64 IN

## 2024-07-09 DIAGNOSIS — S43.005A UNSPECIFIED DISLOCATION OF LEFT SHOULDER JOINT, INITIAL ENCOUNTER: ICD-10-CM

## 2024-07-09 PROCEDURE — 73030 X-RAY EXAM OF SHOULDER: CPT | Mod: LT

## 2024-07-09 PROCEDURE — 99203 OFFICE O/P NEW LOW 30 MIN: CPT | Mod: 25

## 2024-08-12 NOTE — ED ADULT TRIAGE NOTE - HEIGHT IN CM
Wound Center at MONDAY - FRIDAY 8:00 am - 4:30.  If you need help with your wound outside these hours and cannot wait until we are again available, contact your PCP or go to the hospital emergency room.   PLEASE NOTE: IF YOU ARE UNABLE TO OBTAIN WOUND SUPPLIES, CONTINUE TO USE THE SUPPLIES YOU HAVE AVAILABLE UNTIL YOU ARE ABLE TO REACH US. IT IS MOST IMPORTANT TO KEEP THE WOUND COVERED AT ALL TIMES.     Physician Signature:_______________________    Date: ___________ Time:  ____________        165.1

## 2024-08-21 ENCOUNTER — RX RENEWAL (OUTPATIENT)
Age: 39
End: 2024-08-21

## 2024-08-26 NOTE — OB PROVIDER TRIAGE NOTE - NS_FETALHEARTRATE_OBGYN_ALL_OB_FT
Bushra's Early Intervention nutritionist and bought foods for Bushra Bushra, a [id number] (flowsheet.   155

## 2024-12-10 ENCOUNTER — RX RENEWAL (OUTPATIENT)
Age: 39
End: 2024-12-10

## 2025-01-06 NOTE — OB RN PATIENT PROFILE - NS_SOCIALWORKCONS_OBGYN_ALL_OB
Peripheral IV    Performed by: Brandi Mckeon MD  Authorized by: Brandi Mckeon MD    Size:  22 G  Laterality:  Left  Location:  Saphenous  Site Prep:  Alcohol  Technique:  Anatomical landmarks  Attempts:  1       No

## 2025-03-26 ENCOUNTER — RX RENEWAL (OUTPATIENT)
Age: 40
End: 2025-03-26

## 2025-04-02 NOTE — OB PROVIDER TRIAGE NOTE - HISTORY OF PRESENT ILLNESS
Patient/Caregiver provided printed discharge information.
36 y.o.  TIMMY 10/3/2022 @ 16.3 weeks returns to triage with c/o nausea and vomiting. Pt was seen and evaluated yesterday 2022 and patient returns with same symptoms.    OBH: TOP x2 ( D&Cx1,  Med AB),  FT  GDMA1 7#10,  FT  GDMA2 7#8  GYNH: Denies  PMH: T2DM (diagnosed in the postpartum period following  pregnancy), Asthma (last attack )  PSH: D&C x1  Rx: 20u admelog TIDAC, 26u NPH qhs, ASA, PNV, Nebulizer PRN  All: Shrimp, NKDA  Psych: Denies  Social: Denies

## 2025-04-28 ENCOUNTER — EMERGENCY (EMERGENCY)
Facility: HOSPITAL | Age: 40
LOS: 1 days | End: 2025-04-28
Admitting: EMERGENCY MEDICINE
Payer: COMMERCIAL

## 2025-04-28 VITALS
HEART RATE: 76 BPM | SYSTOLIC BLOOD PRESSURE: 130 MMHG | HEIGHT: 64 IN | OXYGEN SATURATION: 96 % | DIASTOLIC BLOOD PRESSURE: 88 MMHG | WEIGHT: 171.96 LBS | TEMPERATURE: 98 F | RESPIRATION RATE: 17 BRPM

## 2025-04-28 DIAGNOSIS — Z98.890 OTHER SPECIFIED POSTPROCEDURAL STATES: Chronic | ICD-10-CM

## 2025-04-28 LAB
APPEARANCE UR: ABNORMAL
BACTERIA # UR AUTO: NEGATIVE /HPF — SIGNIFICANT CHANGE UP
BILIRUB UR-MCNC: NEGATIVE — SIGNIFICANT CHANGE UP
CAST: 0 /LPF — SIGNIFICANT CHANGE UP (ref 0–4)
COLOR SPEC: YELLOW — SIGNIFICANT CHANGE UP
DIFF PNL FLD: NEGATIVE — SIGNIFICANT CHANGE UP
GLUCOSE UR QL: NEGATIVE MG/DL — SIGNIFICANT CHANGE UP
KETONES UR-MCNC: NEGATIVE MG/DL — SIGNIFICANT CHANGE UP
LEUKOCYTE ESTERASE UR-ACNC: NEGATIVE — SIGNIFICANT CHANGE UP
NITRITE UR-MCNC: NEGATIVE — SIGNIFICANT CHANGE UP
PH UR: 5.5 — SIGNIFICANT CHANGE UP (ref 5–8)
PROT UR-MCNC: SIGNIFICANT CHANGE UP MG/DL
RBC CASTS # UR COMP ASSIST: 4 /HPF — SIGNIFICANT CHANGE UP (ref 0–4)
SP GR SPEC: 1.03 — SIGNIFICANT CHANGE UP (ref 1–1.03)
SQUAMOUS # UR AUTO: 2 /HPF — SIGNIFICANT CHANGE UP (ref 0–5)
UROBILINOGEN FLD QL: 0.2 MG/DL — SIGNIFICANT CHANGE UP (ref 0.2–1)
WBC UR QL: 2 /HPF — SIGNIFICANT CHANGE UP (ref 0–5)

## 2025-04-28 PROCEDURE — 99284 EMERGENCY DEPT VISIT MOD MDM: CPT

## 2025-04-28 RX ORDER — OXYCODONE HYDROCHLORIDE 30 MG/1
5 TABLET ORAL ONCE
Refills: 0 | Status: DISCONTINUED | OUTPATIENT
Start: 2025-04-28 | End: 2025-04-28

## 2025-04-28 RX ORDER — METHYLPREDNISOLONE ACETATE 80 MG/ML
0 INJECTION, SUSPENSION INTRA-ARTICULAR; INTRALESIONAL; INTRAMUSCULAR; SOFT TISSUE
Refills: 0
Start: 2025-04-28

## 2025-04-28 RX ORDER — ACETAMINOPHEN 500 MG/5ML
975 LIQUID (ML) ORAL ONCE
Refills: 0 | Status: COMPLETED | OUTPATIENT
Start: 2025-04-28 | End: 2025-04-28

## 2025-04-28 RX ORDER — OXYCODONE HYDROCHLORIDE AND ACETAMINOPHEN 10; 325 MG/1; MG/1
1 TABLET ORAL
Qty: 12 | Refills: 0
Start: 2025-04-28 | End: 2025-04-30

## 2025-04-28 RX ORDER — OXYCODONE HYDROCHLORIDE AND ACETAMINOPHEN 10; 325 MG/1; MG/1
1 TABLET ORAL
Refills: 0
Start: 2025-04-28

## 2025-04-28 RX ORDER — DEXAMETHASONE 0.5 MG/1
6 TABLET ORAL ONCE
Refills: 0 | Status: COMPLETED | OUTPATIENT
Start: 2025-04-28 | End: 2025-04-28

## 2025-04-28 RX ORDER — METHYLPREDNISOLONE ACETATE 80 MG/ML
1 INJECTION, SUSPENSION INTRA-ARTICULAR; INTRALESIONAL; INTRAMUSCULAR; SOFT TISSUE
Qty: 24 | Refills: 0
Start: 2025-04-28 | End: 2025-05-03

## 2025-04-28 RX ADMIN — Medication 975 MILLIGRAM(S): at 04:19

## 2025-04-28 RX ADMIN — DEXAMETHASONE 6 MILLIGRAM(S): 0.5 TABLET ORAL at 04:19

## 2025-04-28 RX ADMIN — OXYCODONE HYDROCHLORIDE 5 MILLIGRAM(S): 30 TABLET ORAL at 04:19

## 2025-04-28 NOTE — ED ADULT TRIAGE NOTE - HISTORY OF COVID-19 VACCINATION
Sonia advised of recommendation from Dr. Riley, she will discuss with her supervisor if this can be done for patient and call back.   Vaccine status unknown

## 2025-04-28 NOTE — ED ADULT NURSE NOTE - OBJECTIVE STATEMENT
Received pt in intake room 10B with lower back radiating to right leg that started 1 month ago. States has been taking flexeril and Meloxicam with minimal relief. Denies any numbness or tingling. Denies urinary symptoms. Medicated as ordered.

## 2025-04-28 NOTE — ED ADULT TRIAGE NOTE - SOURCE OF INFORMATION
Pt here for alcohol detox. Last drink yesterday. Been drinking heavy for the last 2 weeks. 1 liter of vodka a day. Pt is hyperventilating and having bilateral carpopedall spasms. Also complaining of tingling in mouth, hands and feet. Patient

## 2025-04-28 NOTE — ED PROVIDER NOTE - NSICDXPASTMEDICALHX_GEN_ALL_CORE_FT
PAST MEDICAL HISTORY:  Asthma last attack 02/21    Lumbar herniated disc     Sciatica     Type 2 diabetes mellitus

## 2025-04-28 NOTE — ED ADULT TRIAGE NOTE - CHIEF COMPLAINT QUOTE
ambulatory c/o lower back radiating to RLE that started 1 month ago. States has been taking flexeril and Meloxicam with minimal relief. Denies any numbness or tingling. Denies urinary symptoms. Hx, Herniated disc, Sciatica, DM2, HTN

## 2025-04-28 NOTE — ED ADULT NURSE NOTE - NSFALLRISKASMT_ED_ALL_ED_DT
Last office visit: 09/04/2020  Follow up appointment: 06/04/2021  Medication: Flecainide 100 mg  Refill sent to: [facility]   Medication refilled per protocol.    
28-Apr-2025 04:28

## 2025-04-28 NOTE — ED PROVIDER NOTE - PROGRESS NOTE DETAILS
LANA Quinones: Pt advised of work-up/test results (UA negative) and discussed return precautions. Pt provided with a copy of all tests done in the ED at this time. All questions answered, pt verbalized understanding.

## 2025-04-28 NOTE — ED PROVIDER NOTE - PHYSICAL EXAMINATION
CONSTITUTIONAL: Well-appearing; well-nourished; in no apparent distress. Non-toxic appearing.   NEURO: Alert & oriented. Gait steady without assistance. Sensory and motor functions are grossly intact. Strength to lower extremities 5/5.   PSYCH: Mood appropriate. Thought processes intact.   NECK: Supple  CARD: Well perfused.   RESP: Breathing unlabored, speaking full sentences.    ABD: Soft, non-distended, non-tender.   : No CVA tenderness b/l.   MUSCULOSKELETAL/EXTREMITIES: FROM in all four extremities; no extremity edema.  Back: No obvious deformity, ecchymosis, contusions, or rash. There is no bony tenderness to palpation. Right lumbar paraspinous muscle tenderness w/SI joint tenderness. Negative straight leg raise. ROM intact but painful with flexion/extension.   SKIN: As noted above.

## 2025-04-28 NOTE — ED PROVIDER NOTE - PATIENT PORTAL LINK FT
You can access the FollowMyHealth Patient Portal offered by [facility] by registering at the following website: [email or web address]. By joining [facility]’s FollowMyHealth portal, you will also be able to view your health information using other applications (apps) compatible with our system.

## 2025-04-28 NOTE — ED PROVIDER NOTE - CLINICAL SUMMARY MEDICAL DECISION MAKING FREE TEXT BOX
40 y/o female with history of asthma, T2DM, sciatica, herniated disc presents complaining of acute exacerbation of her right sided low back pain following an event she had her home requiring a lot of lifting and moving.  Patient notes she took Mobic, 2 Motrin, and cyclobenzaprine without relief.  She notes that her primary care doctor prescribed her for physical therapy about 4 weeks ago but she is never started. Pain radiates down her right lower leg into her thigh, no paresthesias, or weakness. Patient denies fever, chills, abdominal pain, dysuria, hematuria, urinary frequency, saddle anesthesia, bowel/urinary incontinence.  No other voiced complaints.  Vitals are stable.  Exam as noted as above.  Will obtain urinalysis to rule out hematuria/UTI though low suspicion given H&P most consistent with exacerbation of sciatica.  Will defer imaging given no suspicion for acute fracture.

## 2025-07-29 NOTE — ED PROVIDER NOTE - CPE EDP RESP NORM
Medication:   propRANolol (INDERAL) 20 MG tablet   Last refill: 1/29/25 #180 R-1    Last office visit date: 5/14/25    Pharmacy: [facility]    Refilled per MD protocol      
normal...

## 2025-08-04 NOTE — OB PROVIDER H&P - PRO INTERPRETER NEED 2
"Outpatient Subjective & Objective     Chief complaint-Preoperative evaluation, Perioperative Medical management, complication reduction plan     Active cardiac conditions- none    Revised cardiac risk index predictors- none    Functional capacity -Examples of physical activity  -housework, yard work can take 1 flight of stairs----- She can undertake all the above activities without  chest pain,chest tightness, Shortness of breath ,dizziness,lightheadedness making her exercise tolerance more,   than 4 Mets.       Review of Systems   Constitutional:  Negative for chills and fever.        No unusual weight changes       HENT:          NOELLE score  / 8      Age over 50        Eyes:         No new visual changes   Respiratory:          No cough , phlegm    No Hemoptysis   Cardiovascular:         As noted   Gastrointestinal:         No overt GI/ blood losses  Bowel movements- Regular     Endocrine:        Prednisone use > 20 mg daily for 3 weeks- none   Genitourinary:  Negative for dysuria.        No urinary hesitancy    Musculoskeletal:         As above       Skin:  Negative for rash.   Neurological:  Negative for syncope.        No unilateral weakness   Hematological:         Current use of Anticoagulants  None  She takes either extra-strength Tylenol 650 mg or 400 mg ibuprofen about 3 times in a week  She plans on holding these medication for surgery   Psychiatric/Behavioral:          No Depression,Anxiety       She has breast cancer screening    FH- No ,bleeding / venous thrombosis ,  in family            No  bleeding, cardiac problems with previous surgeries/procedures.  Medications and Allergies reviewed in epic.     Physical Exam  Blood pressure (!) 145/82, pulse 72, temperature 98.3 °F (36.8 °C), temperature source Oral, resp. rate 18, height 5' 2" (1.575 m), weight 61.7 kg (136 lb), SpO2 98%.  Sheet gown and the presence of a chaperone during physical examination   She was comfortable to proceed with the " exam without the the presence of a chaperone        Physical Exam  Constitutional- Vitals - Body mass index is 24.87 kg/m².,   Vitals:    08/04/25 1103   BP: (!) 145/82   Pulse: 72   Resp: 18   Temp: 98.3 °F (36.8 °C)     General appearance-Conscious,Coherent  Eyes- No conjunctival icterus,pupils  round  and reactive to light   ENT-Oral cavity- moist    , Hearing grossly normal   Neck- No thyromegaly ,Trachea -central, No jugular venous distension,   No Carotid Bruit   Cardiovascular -Heart Sounds- Normal  and  no murmur   , No gallop rhythm   Respiratory - Normal Respiratory Effort, Normal breath sounds,  no wheeze , and  no forced expiratory wheeze    Peripheral pitting pedal edema-- none , no calf pain   Gastrointestinal -Soft abdomen, No palpable masses, Non Tender,Liver,Spleen not palpable. No-- free fluid and shifting dullness  Musculoskeletal- No finger Clubbing. Strength grossly normal   Lymphatic-No Palpable cervical, axillary,Inguinal lymphadenopathy   Psychiatric - normal effect,Orientation  Rt Dorsalis pedis pulses-palpable    Lt Dorsalis pedis pulses- palpable   Rt Posterior tibial pulses -palpable   Left posterior tibial pulses -palpable   Miscellaneous -  no renal bruit       Investigations  Lab and Imaging have been reviewed in epic.      Review of old records- Was done and information gathered regards to events leading to surgery and health conditions of significance in the perioperative period.    Outpatient Subjective & Objective    English

## 2025-08-19 ENCOUNTER — APPOINTMENT (OUTPATIENT)
Dept: CARDIOLOGY | Facility: CLINIC | Age: 40
End: 2025-08-19
Payer: COMMERCIAL

## 2025-08-19 ENCOUNTER — NON-APPOINTMENT (OUTPATIENT)
Age: 40
End: 2025-08-19

## 2025-08-19 VITALS
DIASTOLIC BLOOD PRESSURE: 82 MMHG | BODY MASS INDEX: 29.53 KG/M2 | SYSTOLIC BLOOD PRESSURE: 115 MMHG | HEIGHT: 64 IN | OXYGEN SATURATION: 97 % | RESPIRATION RATE: 16 BRPM | HEART RATE: 68 BPM | TEMPERATURE: 99.1 F | WEIGHT: 173 LBS

## 2025-08-19 DIAGNOSIS — R00.2 PALPITATIONS: ICD-10-CM

## 2025-08-19 DIAGNOSIS — R94.39 ABNORMAL RESULT OF OTHER CARDIOVASCULAR FUNCTION STUDY: ICD-10-CM

## 2025-08-19 DIAGNOSIS — Z79.4 TYPE 2 DIABETES MELLITUS W/OUT COMPLICATIONS: ICD-10-CM

## 2025-08-19 DIAGNOSIS — E11.9 TYPE 2 DIABETES MELLITUS W/OUT COMPLICATIONS: ICD-10-CM

## 2025-08-19 PROCEDURE — 93000 ELECTROCARDIOGRAM COMPLETE: CPT

## 2025-08-19 PROCEDURE — G2211 COMPLEX E/M VISIT ADD ON: CPT | Mod: NC

## 2025-08-19 PROCEDURE — 99214 OFFICE O/P EST MOD 30 MIN: CPT

## 2025-08-19 RX ORDER — SEMAGLUTIDE 0.68 MG/ML
2 INJECTION, SOLUTION SUBCUTANEOUS
Qty: 3 | Refills: 3 | Status: ACTIVE | COMMUNITY
Start: 2025-08-19

## 2025-08-19 RX ORDER — LOSARTAN POTASSIUM 25 MG/1
25 TABLET, FILM COATED ORAL
Refills: 0 | Status: ACTIVE | COMMUNITY
Start: 2025-08-19

## 2025-08-20 ENCOUNTER — MESSAGE (OUTPATIENT)
Age: 40
End: 2025-08-20

## 2025-08-20 LAB
ANION GAP SERPL CALC-SCNC: 12 MMOL/L
BUN SERPL-MCNC: 12 MG/DL
CALCIUM SERPL-MCNC: 9.8 MG/DL
CHLORIDE SERPL-SCNC: 103 MMOL/L
CO2 SERPL-SCNC: 24 MMOL/L
CREAT SERPL-MCNC: 0.56 MG/DL
EGFRCR SERPLBLD CKD-EPI 2021: 118 ML/MIN/1.73M2
GLUCOSE SERPL-MCNC: 133 MG/DL
POTASSIUM SERPL-SCNC: 4.5 MMOL/L
SODIUM SERPL-SCNC: 139 MMOL/L

## 2025-08-21 DIAGNOSIS — E78.41 ELEVATED LIPOPROTEIN(A): ICD-10-CM

## 2025-08-21 LAB — APO LP(A) SERPL-MCNC: 78.7 NMOL/L

## 2025-08-27 ENCOUNTER — APPOINTMENT (OUTPATIENT)
Dept: CT IMAGING | Facility: CLINIC | Age: 40
End: 2025-08-27

## 2025-08-27 PROCEDURE — 75574 CT ANGIO HRT W/3D IMAGE: CPT

## 2025-09-10 ENCOUNTER — MESSAGE (OUTPATIENT)
Age: 40
End: 2025-09-10

## 2025-09-16 ENCOUNTER — APPOINTMENT (OUTPATIENT)
Dept: CARDIOLOGY | Facility: CLINIC | Age: 40
End: 2025-09-16